# Patient Record
Sex: FEMALE | Race: BLACK OR AFRICAN AMERICAN | NOT HISPANIC OR LATINO
[De-identification: names, ages, dates, MRNs, and addresses within clinical notes are randomized per-mention and may not be internally consistent; named-entity substitution may affect disease eponyms.]

---

## 2021-07-10 ENCOUNTER — TELEPHONE (OUTPATIENT)
Dept: SCHEDULING | Age: 31
End: 2021-07-10

## 2021-09-08 ENCOUNTER — NURSE NAVIGATOR ENCOUNTER (OUTPATIENT)
Dept: HEMATOLOGY/ONCOLOGY | Facility: HOSPITAL | Age: 31
End: 2021-09-08

## 2021-09-09 ENCOUNTER — HOSPITAL ENCOUNTER (EMERGENCY)
Facility: HOSPITAL | Age: 31
Discharge: HOME | End: 2021-09-09
Attending: EMERGENCY MEDICINE
Payer: COMMERCIAL

## 2021-09-09 ENCOUNTER — APPOINTMENT (OUTPATIENT)
Dept: ULTRASOUND IMAGING | Facility: HOSPITAL | Age: 31
End: 2021-09-09
Attending: EMERGENCY MEDICINE
Payer: COMMERCIAL

## 2021-09-09 ENCOUNTER — APPOINTMENT (OUTPATIENT)
Dept: GENERAL RADIOLOGY | Facility: HOSPITAL | Age: 31
End: 2021-09-09
Payer: COMMERCIAL

## 2021-09-09 VITALS
RESPIRATION RATE: 16 BRPM | TEMPERATURE: 99 F | DIASTOLIC BLOOD PRESSURE: 80 MMHG | HEART RATE: 75 BPM | BODY MASS INDEX: 25.9 KG/M2 | OXYGEN SATURATION: 96 % | HEIGHT: 67 IN | WEIGHT: 165 LBS | SYSTOLIC BLOOD PRESSURE: 133 MMHG

## 2021-09-09 DIAGNOSIS — N63.0 BREAST MASS: Primary | ICD-10-CM

## 2021-09-09 LAB
ALBUMIN SERPL-MCNC: 4.8 G/DL (ref 3.4–5)
ALBUMIN/GLOB SERPL: 1.2 {RATIO} (ref 1–2)
ALP LIVER SERPL-CCNC: 62 U/L
ALT SERPL-CCNC: 11 U/L
ANION GAP SERPL CALC-SCNC: 9 MMOL/L (ref 0–18)
AST SERPL-CCNC: 14 U/L (ref 15–37)
ATRIAL RATE: 82 BPM
BASOPHILS # BLD AUTO: 0.05 X10(3) UL (ref 0–0.2)
BASOPHILS NFR BLD AUTO: 0.8 %
BILIRUB SERPL-MCNC: 1.1 MG/DL (ref 0.1–2)
BILIRUB UR QL STRIP.AUTO: NEGATIVE
BUN BLD-MCNC: 8 MG/DL (ref 7–18)
CALCIUM BLD-MCNC: 9.4 MG/DL (ref 8.5–10.1)
CHLORIDE SERPL-SCNC: 106 MMOL/L (ref 98–112)
CO2 SERPL-SCNC: 21 MMOL/L (ref 21–32)
COLOR UR AUTO: YELLOW
CREAT BLD-MCNC: 0.71 MG/DL
EOSINOPHIL # BLD AUTO: 0.01 X10(3) UL (ref 0–0.7)
EOSINOPHIL NFR BLD AUTO: 0.2 %
ERYTHROCYTE [DISTWIDTH] IN BLOOD BY AUTOMATED COUNT: 12.7 %
GLOBULIN PLAS-MCNC: 4 G/DL (ref 2.8–4.4)
GLUCOSE BLD-MCNC: 83 MG/DL (ref 70–99)
GLUCOSE UR STRIP.AUTO-MCNC: NEGATIVE MG/DL
HCT VFR BLD AUTO: 42 %
HGB BLD-MCNC: 14.5 G/DL
IMM GRANULOCYTES # BLD AUTO: 0.01 X10(3) UL (ref 0–1)
IMM GRANULOCYTES NFR BLD: 0.2 %
KETONES UR STRIP.AUTO-MCNC: 80 MG/DL
LEUKOCYTE ESTERASE UR QL STRIP.AUTO: NEGATIVE
LYMPHOCYTES # BLD AUTO: 1.32 X10(3) UL (ref 1–4)
LYMPHOCYTES NFR BLD AUTO: 22.1 %
M PROTEIN MFR SERPL ELPH: 8.8 G/DL (ref 6.4–8.2)
MCH RBC QN AUTO: 28.7 PG (ref 26–34)
MCHC RBC AUTO-ENTMCNC: 34.5 G/DL (ref 31–37)
MCV RBC AUTO: 83 FL
MONOCYTES # BLD AUTO: 0.32 X10(3) UL (ref 0.1–1)
MONOCYTES NFR BLD AUTO: 5.4 %
NEUTROPHILS # BLD AUTO: 4.27 X10 (3) UL (ref 1.5–7.7)
NEUTROPHILS # BLD AUTO: 4.27 X10(3) UL (ref 1.5–7.7)
NEUTROPHILS NFR BLD AUTO: 71.3 %
NITRITE UR QL STRIP.AUTO: NEGATIVE
OSMOLALITY SERPL CALC.SUM OF ELEC: 279 MOSM/KG (ref 275–295)
P AXIS: -13 DEGREES
P-R INTERVAL: 126 MS
PH UR STRIP.AUTO: 5 [PH] (ref 5–8)
PLATELET # BLD AUTO: 277 10(3)UL (ref 150–450)
POTASSIUM SERPL-SCNC: 3.3 MMOL/L (ref 3.5–5.1)
PROT UR STRIP.AUTO-MCNC: 100 MG/DL
Q-T INTERVAL: 386 MS
QRS DURATION: 92 MS
QTC CALCULATION (BEZET): 450 MS
R AXIS: 63 DEGREES
RBC # BLD AUTO: 5.06 X10(6)UL
RBC UR QL AUTO: NEGATIVE
SARS-COV-2 RNA RESP QL NAA+PROBE: NOT DETECTED
SODIUM SERPL-SCNC: 136 MMOL/L (ref 136–145)
SP GR UR STRIP.AUTO: 1.03 (ref 1–1.03)
T AXIS: 27 DEGREES
TROPONIN I SERPL-MCNC: <0.045 NG/ML (ref ?–0.04)
UROBILINOGEN UR STRIP.AUTO-MCNC: <2 MG/DL
VENTRICULAR RATE: 82 BPM
WBC # BLD AUTO: 6 X10(3) UL (ref 4–11)

## 2021-09-09 PROCEDURE — 93010 ELECTROCARDIOGRAM REPORT: CPT

## 2021-09-09 PROCEDURE — 81001 URINALYSIS AUTO W/SCOPE: CPT | Performed by: EMERGENCY MEDICINE

## 2021-09-09 PROCEDURE — 80053 COMPREHEN METABOLIC PANEL: CPT

## 2021-09-09 PROCEDURE — 71045 X-RAY EXAM CHEST 1 VIEW: CPT | Performed by: EMERGENCY MEDICINE

## 2021-09-09 PROCEDURE — 80053 COMPREHEN METABOLIC PANEL: CPT | Performed by: EMERGENCY MEDICINE

## 2021-09-09 PROCEDURE — 99285 EMERGENCY DEPT VISIT HI MDM: CPT

## 2021-09-09 PROCEDURE — 85025 COMPLETE CBC W/AUTO DIFF WBC: CPT

## 2021-09-09 PROCEDURE — 85025 COMPLETE CBC W/AUTO DIFF WBC: CPT | Performed by: EMERGENCY MEDICINE

## 2021-09-09 PROCEDURE — 84484 ASSAY OF TROPONIN QUANT: CPT

## 2021-09-09 PROCEDURE — 76642 ULTRASOUND BREAST LIMITED: CPT | Performed by: EMERGENCY MEDICINE

## 2021-09-09 PROCEDURE — 93005 ELECTROCARDIOGRAM TRACING: CPT

## 2021-09-09 PROCEDURE — 96374 THER/PROPH/DIAG INJ IV PUSH: CPT

## 2021-09-09 PROCEDURE — 84484 ASSAY OF TROPONIN QUANT: CPT | Performed by: EMERGENCY MEDICINE

## 2021-09-09 RX ORDER — HYDROCODONE BITARTRATE AND ACETAMINOPHEN 5; 325 MG/1; MG/1
1-2 TABLET ORAL EVERY 6 HOURS PRN
Qty: 10 TABLET | Refills: 0 | Status: SHIPPED | OUTPATIENT
Start: 2021-09-09 | End: 2021-09-29

## 2021-09-09 RX ORDER — KETOROLAC TROMETHAMINE 30 MG/ML
15 INJECTION, SOLUTION INTRAMUSCULAR; INTRAVENOUS ONCE
Status: COMPLETED | OUTPATIENT
Start: 2021-09-09 | End: 2021-09-09

## 2021-09-09 RX ORDER — ALPRAZOLAM 0.5 MG/1
0.5 TABLET ORAL 3 TIMES DAILY PRN
Qty: 20 TABLET | Refills: 0 | Status: SHIPPED | OUTPATIENT
Start: 2021-09-09 | End: 2021-09-16

## 2021-09-09 NOTE — ED INITIAL ASSESSMENT (HPI)
Pt to ED for evaluation of chest with onset two days ago. Pt also reports left groin pain, and back pain as well. Pt denies nausea, vomiting and reports some SOB. Pt had breast biopsy done last Thursday.

## 2021-09-09 NOTE — ED PROVIDER NOTES
Patient Seen in: [facility] Emergency Department      History   No chief complaint on file. Stated Complaint: Chest Pain    HPI/Subjective:   HPI    75-year-old female underwent left breast biopsy at [address] [place] [address] last week (results unknown).   She biopsy was taken. There is a mass that is approximately 3 to 4 cm in length without fluctuance. No warmth or erythema. Tenderness is present. Abdomen is soft and nontender without masses or rebound.   Groins are nontender without adenopathy although the (CPT=71045)    Result Date: 9/9/2021            PROCEDURE:  XR CHEST AP PORTABLE  (CPT=71045)  TECHNIQUE:  AP chest radiograph was obtained. COMPARISON:  None.   INDICATIONS:  Chest Pain  PATIENT STATED HISTORY: (As transcribed by Technologist)  Starla guerin September 9, 2021. Dictated by (CST): Roberta Arvizu MD on 9/09/2021 at 2:16 PM     Finalized by (CST): Roberta Arvizu MD on 9/09/2021 at 2:24 PM       I personally reviewed the images myself and went over results with patient.     Case was discussed with

## 2021-09-10 ENCOUNTER — NURSE NAVIGATOR ENCOUNTER (OUTPATIENT)
Dept: HEMATOLOGY/ONCOLOGY | Facility: HOSPITAL | Age: 31
End: 2021-09-10

## 2021-09-10 NOTE — PROGRESS NOTES
Phoned patient to discuss appointment as scheduled with Dr. Buster Locke on 9/14. [facility] with time and location. Contact information provided and requested return call to confirm appointment date.

## 2021-09-13 ENCOUNTER — NURSE NAVIGATOR ENCOUNTER (OUTPATIENT)
Dept: HEMATOLOGY/ONCOLOGY | Facility: HOSPITAL | Age: 31
End: 2021-09-13

## 2021-09-13 NOTE — PROGRESS NOTES
Spoke with patient regarding moving medical oncology appointment with Dr. Adelina Whitmore. Pt states she has tests tomorrow, so she is unable to make her appointment.  Assisted in re scheduling to 9/21 at 8:30 am.

## 2021-09-14 ENCOUNTER — APPOINTMENT (OUTPATIENT)
Dept: HEMATOLOGY/ONCOLOGY | Facility: HOSPITAL | Age: 31
End: 2021-09-14
Attending: SPECIALIST
Payer: COMMERCIAL

## 2021-09-14 ENCOUNTER — TELEPHONE (OUTPATIENT)
Dept: SURGERY | Facility: CLINIC | Age: 31
End: 2021-09-14

## 2021-09-14 NOTE — TELEPHONE ENCOUNTER
Calling pt in regards to her appt on Friday. Informed pt that we will need to move this appt due to surgery. Informed pt that I noticed she cancelled her consult with Dr. Joanna Alfaro.   Informed pt that we will see her after she meets with Dr. Joanna Alfaro, as his

## 2021-09-20 ENCOUNTER — NURSE NAVIGATOR ENCOUNTER (OUTPATIENT)
Dept: HEMATOLOGY/ONCOLOGY | Facility: HOSPITAL | Age: 31
End: 2021-09-20

## 2021-09-20 NOTE — PROGRESS NOTES
Spoke with patient regarding upcoming appointment with Dr. Cal Tavarez. We discussed plan, pt did start chemotherapy on Friday last week.  She is still considering a second opinion, but she is going to call her oncologists office and ask them to send us records

## 2021-09-21 ENCOUNTER — APPOINTMENT (OUTPATIENT)
Dept: HEMATOLOGY/ONCOLOGY | Facility: HOSPITAL | Age: 31
End: 2021-09-21
Attending: SPECIALIST
Payer: COMMERCIAL

## 2021-09-23 ENCOUNTER — TELEPHONE (OUTPATIENT)
Dept: HEMATOLOGY/ONCOLOGY | Facility: HOSPITAL | Age: 31
End: 2021-09-23

## 2021-09-23 ENCOUNTER — NURSE NAVIGATOR ENCOUNTER (OUTPATIENT)
Dept: HEMATOLOGY/ONCOLOGY | Facility: HOSPITAL | Age: 31
End: 2021-09-23

## 2021-09-23 NOTE — TELEPHONE ENCOUNTER
Dr Bela Somers would like to discuss this patient, she would be a new patient for Dr Stanton Nicely but doctor believes patient might have already reached out to him.  Dr Bela Somers can be reached at [phone]

## 2021-09-23 NOTE — PROGRESS NOTES
Spoke with patient regarding plan of care. Pt would like to transfer care to Dr. Buster Locke. Records will be sent. Assisted in scheduling apt on 9/29 in [facility], time and location confirmed with patient.

## 2021-09-27 PROBLEM — C50.819 MALIGNANT NEOPLASM OF OVERLAPPING SITES OF BREAST IN FEMALE, ESTROGEN RECEPTOR POSITIVE  (HCC): Status: ACTIVE | Noted: 2021-09-27

## 2021-09-27 PROBLEM — C50.819 MALIGNANT NEOPLASM OF OVERLAPPING SITES OF BREAST IN FEMALE, ESTROGEN RECEPTOR POSITIVE (HCC): Status: ACTIVE | Noted: 2021-09-27

## 2021-09-27 PROBLEM — C50.819 MALIGNANT NEOPLASM OF OVERLAPPING SITES OF BREAST IN FEMALE, ESTROGEN RECEPTOR POSITIVE: Status: ACTIVE | Noted: 2021-09-27

## 2021-09-27 PROBLEM — Z17.0 MALIGNANT NEOPLASM OF OVERLAPPING SITES OF BREAST IN FEMALE, ESTROGEN RECEPTOR POSITIVE (HCC): Status: ACTIVE | Noted: 2021-09-27

## 2021-09-27 PROBLEM — Z17.0 MALIGNANT NEOPLASM OF OVERLAPPING SITES OF BREAST IN FEMALE, ESTROGEN RECEPTOR POSITIVE  (HCC): Status: ACTIVE | Noted: 2021-09-27

## 2021-09-27 PROBLEM — Z17.0 MALIGNANT NEOPLASM OF OVERLAPPING SITES OF BREAST IN FEMALE, ESTROGEN RECEPTOR POSITIVE: Status: ACTIVE | Noted: 2021-09-27

## 2021-09-27 NOTE — PROGRESS NOTES
THE [facility] Hematology Oncology Group Consultation Note      Patient Name: Allie Cardenas   YOB: 1990  Medical Record Number: [id number]  Attending Physician: Delma Cedeno. Magaly Hauser M.D.      Date of Consultation: 9/29/2021      Reason for Consultation o'clock position with suspicious enhancement involving the skin along the inferior breast but not the chest wall, a 0.9 cm enhancing mass with enhancement at the 12 o'clock position, a 1.1 cm enhancing mass at the 6 o'clock position, a 0.7 cm indeterminate tablet (0.5 mg total) by mouth 3 (three) times daily as needed for Anxiety. , Disp: 20 tablet, Rfl: 0    Allergies   Ms. Parmjit Villegas has No Known Allergies. Review of Systems   Constitutional      No fevers, chills, night sweats, excessive fatigue.   Nick Rang distress. Cardiovascular     Regular rate and rhythm. Abdomen            Non-tender; non-distended. Musculoskeletal   No joint swelling or erythema. Extremities          No lower extremity edema. Integumentary      Skin is warm and dry.   Neurologic - 1.00 x10(3) uL    Eosinophil Absolute Manual 0.00 0.00 - 0.70 x10(3) uL    Basophil Absolute Manual 0.00 0.00 - 0.20 x10(3) uL    Neutrophils % Manual 64 %    Band % 5 %    Lymphocyte % Manual 20 %    Monocyte % Manual 11 %    Eosinophil % Manual 0 % If she is negative, her father should undergo testing. 4.   Hypokalemia: Likely related to GI losses. Start Kdur 20 mEq daily. 5.   Emotional well being: Patient's emotional well being was assessed.   No issues requiring acute psychosocial intervent

## 2021-09-28 ENCOUNTER — NURSE NAVIGATOR ENCOUNTER (OUTPATIENT)
Dept: HEMATOLOGY/ONCOLOGY | Facility: HOSPITAL | Age: 31
End: 2021-09-28

## 2021-09-28 NOTE — PROGRESS NOTES
Spoke with patient regarding appointment with Dr. Daxa Sheth, pt confirms time and location for 9/29. We also discussed consultation with Dr. Kenisha Chan, pt confirms that 10/1 at 10 am with Dr. Kenisha Chan will work. This will be scheduled for her.

## 2021-09-29 ENCOUNTER — OFFICE VISIT (OUTPATIENT)
Dept: HEMATOLOGY/ONCOLOGY | Age: 31
End: 2021-09-29
Attending: SPECIALIST
Payer: COMMERCIAL

## 2021-09-29 ENCOUNTER — TELEPHONE (OUTPATIENT)
Dept: HEMATOLOGY/ONCOLOGY | Facility: HOSPITAL | Age: 31
End: 2021-09-29

## 2021-09-29 ENCOUNTER — SOCIAL WORK SERVICES (OUTPATIENT)
Dept: HEMATOLOGY/ONCOLOGY | Facility: HOSPITAL | Age: 31
End: 2021-09-29

## 2021-09-29 ENCOUNTER — NURSE ONLY (OUTPATIENT)
Dept: HEMATOLOGY/ONCOLOGY | Age: 31
End: 2021-09-29
Attending: SPECIALIST
Payer: COMMERCIAL

## 2021-09-29 VITALS
OXYGEN SATURATION: 98 % | HEIGHT: 66 IN | DIASTOLIC BLOOD PRESSURE: 94 MMHG | WEIGHT: 158.88 LBS | BODY MASS INDEX: 25.54 KG/M2 | HEART RATE: 98 BPM | RESPIRATION RATE: 18 BRPM | SYSTOLIC BLOOD PRESSURE: 175 MMHG | TEMPERATURE: 100 F

## 2021-09-29 DIAGNOSIS — R19.00 PELVIC MASS: ICD-10-CM

## 2021-09-29 DIAGNOSIS — Z17.0 MALIGNANT NEOPLASM OF OVERLAPPING SITES OF LEFT BREAST IN FEMALE, ESTROGEN RECEPTOR POSITIVE (HCC): Primary | ICD-10-CM

## 2021-09-29 DIAGNOSIS — Z15.01 BRCA2 POSITIVE: ICD-10-CM

## 2021-09-29 DIAGNOSIS — C50.812 MALIGNANT NEOPLASM OF OVERLAPPING SITES OF LEFT BREAST IN FEMALE, ESTROGEN RECEPTOR POSITIVE (HCC): ICD-10-CM

## 2021-09-29 DIAGNOSIS — R50.9 LOW GRADE FEVER: ICD-10-CM

## 2021-09-29 DIAGNOSIS — Z15.09 BRCA2 POSITIVE: ICD-10-CM

## 2021-09-29 DIAGNOSIS — R50.9 FEVER, UNSPECIFIED FEVER CAUSE: ICD-10-CM

## 2021-09-29 DIAGNOSIS — C50.812 MALIGNANT NEOPLASM OF OVERLAPPING SITES OF LEFT BREAST IN FEMALE, ESTROGEN RECEPTOR POSITIVE (HCC): Primary | ICD-10-CM

## 2021-09-29 DIAGNOSIS — Z17.0 MALIGNANT NEOPLASM OF OVERLAPPING SITES OF LEFT BREAST IN FEMALE, ESTROGEN RECEPTOR POSITIVE (HCC): ICD-10-CM

## 2021-09-29 DIAGNOSIS — E87.6 HYPOKALEMIA: ICD-10-CM

## 2021-09-29 LAB
ALBUMIN SERPL-MCNC: 3.8 G/DL (ref 3.4–5)
ALBUMIN/GLOB SERPL: 1.1 {RATIO} (ref 1–2)
ALP LIVER SERPL-CCNC: 79 U/L
ALT SERPL-CCNC: 38 U/L
ANION GAP SERPL CALC-SCNC: 7 MMOL/L (ref 0–18)
AST SERPL-CCNC: 26 U/L (ref 15–37)
BASOPHILS # BLD: 0 X10(3) UL (ref 0–0.2)
BASOPHILS NFR BLD: 0 %
BILIRUB SERPL-MCNC: 0.3 MG/DL (ref 0.1–2)
BRCA1 C.185DELAG BLD/T QL: 16.8 U/ML (ref ?–38)
BUN BLD-MCNC: 5 MG/DL (ref 7–18)
CALCIUM BLD-MCNC: 8.9 MG/DL (ref 8.5–10.1)
CANCER AG125 SERPL-ACNC: 22.3 U/ML (ref ?–35)
CANCER AG15-3 SERPL-ACNC: 8.2 U/ML (ref ?–35)
CHLORIDE SERPL-SCNC: 99 MMOL/L (ref 98–112)
CO2 SERPL-SCNC: 30 MMOL/L (ref 21–32)
CREAT BLD-MCNC: 1.09 MG/DL
EOSINOPHIL # BLD: 0 X10(3) UL (ref 0–0.7)
EOSINOPHIL NFR BLD: 0 %
ERYTHROCYTE [DISTWIDTH] IN BLOOD BY AUTOMATED COUNT: 12.8 %
GLOBULIN PLAS-MCNC: 3.6 G/DL (ref 2.8–4.4)
GLUCOSE BLD-MCNC: 104 MG/DL (ref 70–99)
HCT VFR BLD AUTO: 34.6 %
HGB BLD-MCNC: 11.5 G/DL
LYMPHOCYTES NFR BLD: 1.86 X10(3) UL (ref 1–4)
LYMPHOCYTES NFR BLD: 20 %
MAGNESIUM SERPL-MCNC: 2.1 MG/DL (ref 1.6–2.6)
MCH RBC QN AUTO: 28.3 PG (ref 26–34)
MCHC RBC AUTO-ENTMCNC: 33.2 G/DL (ref 31–37)
MCV RBC AUTO: 85 FL
MONOCYTES # BLD: 1.02 X10(3) UL (ref 0.1–1)
MONOCYTES NFR BLD: 11 %
MORPHOLOGY: NORMAL
NEUTROPHILS # BLD AUTO: 6.28 X10 (3) UL (ref 1.5–7.7)
NEUTROPHILS NFR BLD: 64 %
NEUTS BAND NFR BLD: 5 %
NEUTS HYPERSEG # BLD: 6.42 X10(3) UL (ref 1.5–7.7)
OSMOLALITY SERPL CALC.SUM OF ELEC: 280 MOSM/KG (ref 275–295)
PLATELET # BLD AUTO: 221 10(3)UL (ref 150–450)
PLATELET MORPHOLOGY: NORMAL
POTASSIUM SERPL-SCNC: 2.7 MMOL/L (ref 3.5–5.1)
PROT SERPL-MCNC: 7.4 G/DL (ref 6.4–8.2)
RBC # BLD AUTO: 4.07 X10(6)UL
SODIUM SERPL-SCNC: 136 MMOL/L (ref 136–145)
TOTAL CELLS COUNTED: 100
WBC # BLD AUTO: 9.3 X10(3) UL (ref 4–11)

## 2021-09-29 PROCEDURE — 86300 IMMUNOASSAY TUMOR CA 15-3: CPT

## 2021-09-29 PROCEDURE — 85025 COMPLETE CBC W/AUTO DIFF WBC: CPT

## 2021-09-29 PROCEDURE — 85027 COMPLETE CBC AUTOMATED: CPT

## 2021-09-29 PROCEDURE — 36591 DRAW BLOOD OFF VENOUS DEVICE: CPT

## 2021-09-29 PROCEDURE — 99205 OFFICE O/P NEW HI 60 MIN: CPT | Performed by: SPECIALIST

## 2021-09-29 PROCEDURE — 85007 BL SMEAR W/DIFF WBC COUNT: CPT

## 2021-09-29 PROCEDURE — 83735 ASSAY OF MAGNESIUM: CPT

## 2021-09-29 PROCEDURE — 80053 COMPREHEN METABOLIC PANEL: CPT

## 2021-09-29 PROCEDURE — 87040 BLOOD CULTURE FOR BACTERIA: CPT

## 2021-09-29 PROCEDURE — 86304 IMMUNOASSAY TUMOR CA 125: CPT

## 2021-09-29 RX ORDER — PROCHLORPERAZINE MALEATE 5 MG/1
5 TABLET ORAL EVERY 6 HOURS PRN
COMMUNITY
End: 2022-01-10

## 2021-09-29 RX ORDER — LORATADINE 10 MG/1
10 TABLET ORAL DAILY
COMMUNITY

## 2021-09-29 RX ORDER — ONDANSETRON 8 MG/1
8 TABLET, ORALLY DISINTEGRATING ORAL EVERY 8 HOURS PRN
COMMUNITY
End: 2022-01-05

## 2021-09-29 RX ORDER — SULFAMETHOXAZOLE AND TRIMETHOPRIM 800; 160 MG/1; MG/1
1 TABLET ORAL EVERY 12 HOURS
COMMUNITY
Start: 2021-09-26 | End: 2021-09-29 | Stop reason: ALTCHOICE

## 2021-09-29 RX ORDER — POTASSIUM CHLORIDE 20 MEQ/1
20 TABLET, EXTENDED RELEASE ORAL DAILY
Qty: 30 TABLET | Refills: 2 | Status: SHIPPED | OUTPATIENT
Start: 2021-09-29 | End: 2022-01-04

## 2021-09-29 RX ORDER — ACETAMINOPHEN 500 MG
1000 TABLET ORAL EVERY 6 HOURS PRN
COMMUNITY

## 2021-09-29 NOTE — PROGRESS NOTES
Patient is here today for Consult with  with Edgardo De Jesus for Breast Cancer. Patient denies pain. Temp today is 100.3 stated she is being treated for a UTI. Has had one chemotherapy TCHP completed at [facility] - next treatment due October 7th.  [facility]

## 2021-09-29 NOTE — TELEPHONE ENCOUNTER
Per  patient to take Potassium chloride 20meq daily. Uses [employer] in [place]. Instructed to take on tablet daily. Patient stated understanding will start Potassium today when received.

## 2021-09-29 NOTE — PROGRESS NOTES
ADRI met with patient at request of Dr. Mao Cotton. Patient lives with her mother and reports lots of family nearby. Patient reports that she currently works from home and states that her supervisor is flexible with her time so no FMLA is needed.  Adri discussed o

## 2021-10-01 ENCOUNTER — OFFICE VISIT (OUTPATIENT)
Dept: SURGERY | Facility: CLINIC | Age: 31
End: 2021-10-01
Payer: COMMERCIAL

## 2021-10-01 ENCOUNTER — NURSE NAVIGATOR ENCOUNTER (OUTPATIENT)
Dept: HEMATOLOGY/ONCOLOGY | Facility: HOSPITAL | Age: 31
End: 2021-10-01

## 2021-10-01 VITALS
DIASTOLIC BLOOD PRESSURE: 87 MMHG | HEIGHT: 66 IN | TEMPERATURE: 98 F | SYSTOLIC BLOOD PRESSURE: 148 MMHG | HEART RATE: 87 BPM | RESPIRATION RATE: 18 BRPM | BODY MASS INDEX: 25.74 KG/M2 | WEIGHT: 160.19 LBS | OXYGEN SATURATION: 98 %

## 2021-10-01 DIAGNOSIS — C50.812 MALIGNANT NEOPLASM OF OVERLAPPING SITES OF LEFT BREAST IN FEMALE, ESTROGEN RECEPTOR POSITIVE (HCC): Primary | ICD-10-CM

## 2021-10-01 DIAGNOSIS — R35.0 URINE FREQUENCY: Primary | ICD-10-CM

## 2021-10-01 DIAGNOSIS — Z17.0 MALIGNANT NEOPLASM OF OVERLAPPING SITES OF LEFT BREAST IN FEMALE, ESTROGEN RECEPTOR POSITIVE (HCC): Primary | ICD-10-CM

## 2021-10-01 PROCEDURE — 3008F BODY MASS INDEX DOCD: CPT | Performed by: SURGERY

## 2021-10-01 PROCEDURE — 99245 OFF/OP CONSLTJ NEW/EST HI 55: CPT | Performed by: SURGERY

## 2021-10-01 PROCEDURE — 3077F SYST BP >= 140 MM HG: CPT | Performed by: SURGERY

## 2021-10-01 PROCEDURE — 3079F DIAST BP 80-89 MM HG: CPT | Performed by: SURGERY

## 2021-10-01 RX ORDER — ALPRAZOLAM 0.25 MG/1
0.25 TABLET ORAL NIGHTLY PRN
COMMUNITY

## 2021-10-01 NOTE — PROGRESS NOTES
Met with patient in Dr. Vidya Cardenas  clinic. Introduced myself as the breast navigator nurse and explained the role of the breast nurse navigator and coordination of care.  Explained the role of all of the physicians involved in her care including the surgeon,

## 2021-10-01 NOTE — PROGRESS NOTES
Called patient and notified her that I contacted Dr. Josue Shah regarding her questions about her urinary tract infection questions and about not starting her next 3-day regimen of Bactrim due to her not feeling well on the antibiotic that was ordered by the

## 2021-10-03 NOTE — PROGRESS NOTES
THE [facility] Hematology Oncology Group Progress Note      Patient Name: Humberto Bar   YOB: 1990  Medical Record Number: [id number]  Attending Physician: Delfin Noonan M.D.      Date of Visit: 10/6/2021       Chief Complaint  Invasive ductal suspicious enhancement involving the skin along the inferior breast but not the chest wall, a 0.9 cm enhancing mass with enhancement at the 12 o'clock position, a 1.1 cm enhancing mass at the 6 o'clock position, a 0.7 cm indeterminate mass at the 8 o'clock mouth every 6 (six) hours as needed for Nausea., Disp: , Rfl:   loratadine 10 MG Oral Tab, Take 10 mg by mouth daily. , Disp: , Rfl:   acetaminophen 500 MG Oral Tab, Take 1,000 mg by mouth every 6 (six) hours as needed for Pain., Disp: , Rfl:   potassium ch mmol/L    Potassium 3.1 (L) 3.5 - 5.1 mmol/L    Chloride 105 98 - 112 mmol/L    CO2 27.0 21.0 - 32.0 mmol/L    Anion Gap 6 0 - 18 mmol/L    BUN 8 7 - 18 mg/dL    Creatinine 0.87 0.55 - 1.02 mg/dL    Calcium, Total 9.0 8.5 - 10.1 mg/dL    Calculated Osmolal prophylactic right mastectomy and she is amenable. Patient has seen Dr. Stephanie Enriquez. Patient will require adjuvant radiotherapy.            As her disease is hormone receptor positive, adjuvant endocrine therapy for at least 5 years will be recommended

## 2021-10-04 ENCOUNTER — APPOINTMENT (OUTPATIENT)
Dept: HEMATOLOGY/ONCOLOGY | Facility: HOSPITAL | Age: 31
End: 2021-10-04
Attending: SPECIALIST
Payer: COMMERCIAL

## 2021-10-04 DIAGNOSIS — R35.0 URINE FREQUENCY: ICD-10-CM

## 2021-10-04 PROCEDURE — 81003 URINALYSIS AUTO W/O SCOPE: CPT

## 2021-10-06 ENCOUNTER — OFFICE VISIT (OUTPATIENT)
Dept: HEMATOLOGY/ONCOLOGY | Age: 31
End: 2021-10-06
Attending: SPECIALIST
Payer: COMMERCIAL

## 2021-10-06 ENCOUNTER — SOCIAL WORK SERVICES (OUTPATIENT)
Dept: HEMATOLOGY/ONCOLOGY | Facility: HOSPITAL | Age: 31
End: 2021-10-06

## 2021-10-06 VITALS
RESPIRATION RATE: 16 BRPM | DIASTOLIC BLOOD PRESSURE: 90 MMHG | OXYGEN SATURATION: 98 % | WEIGHT: 159.5 LBS | SYSTOLIC BLOOD PRESSURE: 149 MMHG | HEART RATE: 89 BPM | BODY MASS INDEX: 26 KG/M2 | TEMPERATURE: 100 F

## 2021-10-06 DIAGNOSIS — Z17.0 MALIGNANT NEOPLASM OF OVERLAPPING SITES OF LEFT BREAST IN FEMALE, ESTROGEN RECEPTOR POSITIVE (HCC): Primary | ICD-10-CM

## 2021-10-06 DIAGNOSIS — Z15.09 BRCA2 POSITIVE: ICD-10-CM

## 2021-10-06 DIAGNOSIS — Z15.01 BRCA2 POSITIVE: ICD-10-CM

## 2021-10-06 DIAGNOSIS — C50.812 MALIGNANT NEOPLASM OF OVERLAPPING SITES OF LEFT BREAST IN FEMALE, ESTROGEN RECEPTOR POSITIVE (HCC): Primary | ICD-10-CM

## 2021-10-06 DIAGNOSIS — R19.00 PELVIC MASS: ICD-10-CM

## 2021-10-06 PROCEDURE — 85025 COMPLETE CBC W/AUTO DIFF WBC: CPT

## 2021-10-06 PROCEDURE — 96375 TX/PRO/DX INJ NEW DRUG ADDON: CPT

## 2021-10-06 PROCEDURE — 80053 COMPREHEN METABOLIC PANEL: CPT

## 2021-10-06 PROCEDURE — 96413 CHEMO IV INFUSION 1 HR: CPT

## 2021-10-06 PROCEDURE — 83735 ASSAY OF MAGNESIUM: CPT

## 2021-10-06 PROCEDURE — 96377 APPLICATON ON-BODY INJECTOR: CPT

## 2021-10-06 PROCEDURE — 99215 OFFICE O/P EST HI 40 MIN: CPT | Performed by: SPECIALIST

## 2021-10-06 PROCEDURE — 96417 CHEMO IV INFUS EACH ADDL SEQ: CPT

## 2021-10-06 NOTE — PROGRESS NOTES
met with patient in treatment room for check in as she met with Mitzi Yang prior to treatment. Patient reports that she is still doing well, though is anxious for the start of treatment.  She shared that she does take Xanax, but only at night,

## 2021-10-06 NOTE — PROGRESS NOTES
Pt here for C2 TCHP. Arrives Ambulating independently.     Pregnancy screening: Denies possibility of pregnancy    Modifications in dose or schedule: No     Frequency of blood return and site check throughout administration: Prior to administration, Prior

## 2021-10-06 NOTE — PATIENT INSTRUCTIONS
On-body Injector for Neulasta Patient Instructions       Your On-Body Injection device was applied on this day:  10/6 at this time 3:10pm    Your dose of medication will start on this day: 10/7 at this time 6:10p

## 2021-10-08 ENCOUNTER — NURSE NAVIGATOR ENCOUNTER (OUTPATIENT)
Dept: HEMATOLOGY/ONCOLOGY | Facility: HOSPITAL | Age: 31
End: 2021-10-08

## 2021-10-08 NOTE — PROGRESS NOTES
LMOVMTCB regarding scheduling appointment with Ronal Aguiar for Wednesday November 17, 2021 at [phone] in [place]. Awaiting phone call back from patient.

## 2021-10-14 NOTE — PROGRESS NOTES
Breast Surgery New Patient Consultation    This is the first visit for this 32year old woman, referred by Delma Valle, who presents for evaluation of breast cancer.     History of Present Illness:   Ms. Umberto Torres is a 32year old woman who prese KRISTAN at an outside hospital.  She has transferred her medical oncology care to [facility].  She has tested BRCA positive. She is here today for evaluation and recommendations for further therapy. History reviewed.  No pertinent past medical hist change in vision or color blindness. The patient denies hearing loss, ringing in the ears, ear drainage, earaches, nasal congestion, nose bleeds, snoring, pain in mouth/throat, hoarseness, change in voice, facial trauma.     Respiratory:  The patient denies abusive relationship, bipolar disorder, sleep disturbance, anxiety, depression or feeling of despair. Endocrine: There is no history of poor/slow wound healing, weight loss/gain, fertility or hormone problems, cold intolerance, thyroid disease.      A Nodes:  The supraclavicular, axillary and cervical regions are free of significant lymphadenopathy. Back: There is no vertebral column tenderness. Skin: The skin appears normal. There are no suspicious appearing rashes or lesions. Extremities:  The plan for an MRI post chemo to dictate response to the treatment preoperatively. Given that she is BRCA positive she is planning to elect for bilateral mastectomies with a left sentinel lymph node biopsy and possible left axillary lymph node dissection.   Trudy Phalen

## 2021-10-20 ENCOUNTER — HOSPITAL ENCOUNTER (OUTPATIENT)
Dept: ULTRASOUND IMAGING | Facility: HOSPITAL | Age: 31
Discharge: HOME | End: 2021-10-20
Attending: NURSE PRACTITIONER
Payer: COMMERCIAL

## 2021-10-20 ENCOUNTER — OFFICE VISIT (OUTPATIENT)
Dept: HEMATOLOGY/ONCOLOGY | Age: 31
End: 2021-10-20
Attending: SPECIALIST
Payer: COMMERCIAL

## 2021-10-20 ENCOUNTER — TELEPHONE (OUTPATIENT)
Dept: HEMATOLOGY/ONCOLOGY | Facility: HOSPITAL | Age: 31
End: 2021-10-20

## 2021-10-20 VITALS
WEIGHT: 156 LBS | OXYGEN SATURATION: 99 % | RESPIRATION RATE: 16 BRPM | HEART RATE: 97 BPM | BODY MASS INDEX: 25.07 KG/M2 | SYSTOLIC BLOOD PRESSURE: 171 MMHG | TEMPERATURE: 99 F | HEIGHT: 65.98 IN | DIASTOLIC BLOOD PRESSURE: 99 MMHG

## 2021-10-20 DIAGNOSIS — M79.605 PAIN OF LEFT LOWER EXTREMITY: ICD-10-CM

## 2021-10-20 DIAGNOSIS — C50.812 MALIGNANT NEOPLASM OF OVERLAPPING SITES OF LEFT BREAST IN FEMALE, ESTROGEN RECEPTOR POSITIVE (HCC): ICD-10-CM

## 2021-10-20 DIAGNOSIS — Z17.0 MALIGNANT NEOPLASM OF OVERLAPPING SITES OF LEFT BREAST IN FEMALE, ESTROGEN RECEPTOR POSITIVE (HCC): ICD-10-CM

## 2021-10-20 DIAGNOSIS — C50.812 MALIGNANT NEOPLASM OF OVERLAPPING SITES OF LEFT BREAST IN FEMALE, ESTROGEN RECEPTOR POSITIVE (HCC): Primary | ICD-10-CM

## 2021-10-20 DIAGNOSIS — Z17.0 MALIGNANT NEOPLASM OF OVERLAPPING SITES OF LEFT BREAST IN FEMALE, ESTROGEN RECEPTOR POSITIVE (HCC): Primary | ICD-10-CM

## 2021-10-20 PROCEDURE — 93971 EXTREMITY STUDY: CPT | Performed by: NURSE PRACTITIONER

## 2021-10-20 PROCEDURE — 99214 OFFICE O/P EST MOD 30 MIN: CPT | Performed by: NURSE PRACTITIONER

## 2021-10-20 RX ORDER — NITROFURANTOIN 25; 75 MG/1; MG/1
100 CAPSULE ORAL 2 TIMES DAILY
COMMUNITY
End: 2021-11-17 | Stop reason: ALTCHOICE

## 2021-10-20 NOTE — PROGRESS NOTES
Patient is here today for acute care visit with Elena ENRIQUEZ for Pain Left lateral thigh radiating to calf. Patient rates pain a 6 on a scale of 0-10. Medication list and medical history were reviewed and updated.      Education Record    Learner:

## 2021-10-20 NOTE — PROGRESS NOTES
ANP Visit Note    Patient Name: Darya Castaneda   YOB: 1990   Medical Record Number: [id number]   CSN: [id number]   Date of visit: 10/20/2021       Chief Complaint/Reason for Visit:  Patient presents with:   Other: acute care visit       Histo mouth every 6 (six) hours as needed for Pain., Disp: , Rfl:   •  potassium chloride 20 MEQ Oral Tab CR, Take 1 tablet (20 mEq total) by mouth daily. , Disp: 30 tablet, Rfl: 2    Review of Systems:  A comprehensive 14 point review of systems was completed. Hematology Oncology Group

## 2021-10-25 NOTE — PROGRESS NOTES
[facility] Progress Note      Patient Name: Mervin Couch   YOB: 1990  Medical Record Number: [id number]  Attending Physician: Marianna Bonilla M.D.      Date of Visit: 10/27/2021      Chief Complaint  Invasive ductal suspicious enhancement involving the skin along the inferior breast but not the chest wall, a 0.9 cm enhancing mass with enhancement at the 12 o'clock position, a 1.1 cm enhancing mass at the 6 o'clock position, a 0.7 cm indeterminate mass at the 8 o'clock mouth every 6 (six) hours as needed for Nausea., Disp: , Rfl:   loratadine 10 MG Oral Tab, Take 10 mg by mouth daily. , Disp: , Rfl:   acetaminophen 500 MG Oral Tab, Take 1,000 mg by mouth every 6 (six) hours as needed for Pain., Disp: , Rfl:   potassium ch >=60    AST 10 (L) 15 - 37 U/L    ALT 23 13 - 56 U/L    Alkaline Phosphatase 66 37 - 98 U/L    Bilirubin, Total 0.8 0.1 - 2.0 mg/dL    Total Protein 7.4 6.4 - 8.2 g/dL    Albumin 3.9 3.4 - 5.0 g/dL    Globulin  3.5 2.8 - 4.4 g/dL    A/G Ratio 1.1 1.0 - 2. 0 She wishes to pursue bilateral mastectomy. Patient's staging studies showed a pelvic mass abutting the uterus.  is normal. Patient is scheduled to see Dr. Natasha Elizalde. 3.   Hypokalemia: Likely related to GI losses. Continue Kdur.      Planned Follow Up

## 2021-10-27 ENCOUNTER — OFFICE VISIT (OUTPATIENT)
Dept: HEMATOLOGY/ONCOLOGY | Age: 31
End: 2021-10-27
Attending: SPECIALIST
Payer: COMMERCIAL

## 2021-10-27 VITALS
DIASTOLIC BLOOD PRESSURE: 100 MMHG | SYSTOLIC BLOOD PRESSURE: 157 MMHG | HEART RATE: 74 BPM | OXYGEN SATURATION: 100 % | WEIGHT: 154.5 LBS | BODY MASS INDEX: 24.83 KG/M2 | HEIGHT: 65.98 IN | TEMPERATURE: 99 F | RESPIRATION RATE: 16 BRPM

## 2021-10-27 DIAGNOSIS — Z17.0 MALIGNANT NEOPLASM OF OVERLAPPING SITES OF LEFT BREAST IN FEMALE, ESTROGEN RECEPTOR POSITIVE (HCC): ICD-10-CM

## 2021-10-27 DIAGNOSIS — C50.812 MALIGNANT NEOPLASM OF OVERLAPPING SITES OF LEFT BREAST IN FEMALE, ESTROGEN RECEPTOR POSITIVE (HCC): Primary | ICD-10-CM

## 2021-10-27 DIAGNOSIS — Z15.01 BRCA2 POSITIVE: ICD-10-CM

## 2021-10-27 DIAGNOSIS — C50.812 MALIGNANT NEOPLASM OF OVERLAPPING SITES OF LEFT BREAST IN FEMALE, ESTROGEN RECEPTOR POSITIVE (HCC): ICD-10-CM

## 2021-10-27 DIAGNOSIS — Z79.899 ENCOUNTER FOR MONITORING CARDIOTOXIC DRUG THERAPY: Primary | ICD-10-CM

## 2021-10-27 DIAGNOSIS — E87.6 HYPOKALEMIA: ICD-10-CM

## 2021-10-27 DIAGNOSIS — Z15.09 BRCA2 POSITIVE: ICD-10-CM

## 2021-10-27 DIAGNOSIS — Z51.81 ENCOUNTER FOR MONITORING CARDIOTOXIC DRUG THERAPY: Primary | ICD-10-CM

## 2021-10-27 DIAGNOSIS — Z17.0 MALIGNANT NEOPLASM OF OVERLAPPING SITES OF LEFT BREAST IN FEMALE, ESTROGEN RECEPTOR POSITIVE (HCC): Primary | ICD-10-CM

## 2021-10-27 PROCEDURE — 96413 CHEMO IV INFUSION 1 HR: CPT

## 2021-10-27 PROCEDURE — 99215 OFFICE O/P EST HI 40 MIN: CPT | Performed by: SPECIALIST

## 2021-10-27 PROCEDURE — 80053 COMPREHEN METABOLIC PANEL: CPT

## 2021-10-27 PROCEDURE — 96417 CHEMO IV INFUS EACH ADDL SEQ: CPT

## 2021-10-27 PROCEDURE — 96375 TX/PRO/DX INJ NEW DRUG ADDON: CPT

## 2021-10-27 PROCEDURE — 85025 COMPLETE CBC W/AUTO DIFF WBC: CPT

## 2021-10-27 PROCEDURE — 96377 APPLICATON ON-BODY INJECTOR: CPT

## 2021-10-27 NOTE — PROGRESS NOTES
Pt here for C3D1 of TCHP.   Arrives Ambulating independently, accompanied by Self           Pregnancy screening: Denies possibility of pregnancy    Modifications in dose or schedule: No     Frequency of blood return and site check throughout administration:

## 2021-10-27 NOTE — PROGRESS NOTES
Patient is here today for follow up with Renzo Devine for Breast Cancer. C3 D1 TCHP. Patient stated joint and bone pain improved with Ibuprofen. Medication list,  medical history were reviewed and updated.      Education Record    Learner:  Patient    Diseas

## 2021-10-27 NOTE — PATIENT INSTRUCTIONS
On-body Injector for Neulasta Patient Instructions       Your On-Body Injection device was applied on this day:   Wednesday 10/27/21 at this time 3:49 PM    Your dose of medication will start on this day: Thursday

## 2021-11-15 NOTE — PROGRESS NOTES
THE [facility] Hematology Oncology Group Progress Note      Patient Name: Katie Perez   YOB: 1990  Medical Record Number: [id number]  Attending Physician: Jose M Morel M.D.      Date of Visit: 11/17/2021      Chief Complaint  Invasive ductal suspicious enhancement involving the skin along the inferior breast but not the chest wall, a 0.9 cm enhancing mass with enhancement at the 12 o'clock position, a 1.1 cm enhancing mass at the 6 o'clock position, a 0.7 cm indeterminate mass at the 8 o'clock loratadine 10 MG Oral Tab, Take 10 mg by mouth daily. , Disp: , Rfl:   acetaminophen 500 MG Oral Tab, Take 1,000 mg by mouth every 6 (six) hours as needed for Pain., Disp: , Rfl:   potassium chloride 20 MEQ Oral Tab CR, Take 1 tablet (20 mEq total) by alli Phosphatase 58 37 - 98 U/L    Bilirubin, Total 0.8 0.1 - 2.0 mg/dL    Total Protein 7.6 6.4 - 8.2 g/dL    Albumin 4.2 3.4 - 5.0 g/dL    Globulin  3.4 2.8 - 4.4 g/dL    A/G Ratio 1.2 1.0 - 2.0    FASTING Patient not present    CBC W/ DIFFERENTIAL    Collect /HPF    Yeast Urine None Seen None Seen /HPF      Impression and Plan   1. Invasive ductal carcinoma, left breast: Patient is staged as T2N0M0. Patient's tumor was estrogen and progesterone receptor positive and Her2 positive.           She started neoadj

## 2021-11-17 ENCOUNTER — OFFICE VISIT (OUTPATIENT)
Dept: HEMATOLOGY/ONCOLOGY | Facility: HOSPITAL | Age: 31
End: 2021-11-17
Attending: SPECIALIST
Payer: COMMERCIAL

## 2021-11-17 ENCOUNTER — OFFICE VISIT (OUTPATIENT)
Dept: HEMATOLOGY/ONCOLOGY | Age: 31
End: 2021-11-17
Attending: SPECIALIST
Payer: COMMERCIAL

## 2021-11-17 ENCOUNTER — SOCIAL WORK SERVICES (OUTPATIENT)
Dept: HEMATOLOGY/ONCOLOGY | Facility: HOSPITAL | Age: 31
End: 2021-11-17

## 2021-11-17 VITALS
TEMPERATURE: 98 F | OXYGEN SATURATION: 99 % | HEIGHT: 65.98 IN | SYSTOLIC BLOOD PRESSURE: 159 MMHG | WEIGHT: 155 LBS | RESPIRATION RATE: 16 BRPM | BODY MASS INDEX: 24.91 KG/M2 | HEART RATE: 96 BPM | DIASTOLIC BLOOD PRESSURE: 102 MMHG

## 2021-11-17 VITALS
BODY MASS INDEX: 24.67 KG/M2 | DIASTOLIC BLOOD PRESSURE: 98 MMHG | WEIGHT: 153.5 LBS | SYSTOLIC BLOOD PRESSURE: 177 MMHG | TEMPERATURE: 100 F | HEIGHT: 65.98 IN | OXYGEN SATURATION: 99 % | RESPIRATION RATE: 18 BRPM | HEART RATE: 110 BPM

## 2021-11-17 DIAGNOSIS — R50.9 FEVER, UNSPECIFIED FEVER CAUSE: Primary | ICD-10-CM

## 2021-11-17 DIAGNOSIS — Z51.11 CHEMOTHERAPY MANAGEMENT, ENCOUNTER FOR: ICD-10-CM

## 2021-11-17 DIAGNOSIS — Z17.0 MALIGNANT NEOPLASM OF OVERLAPPING SITES OF LEFT BREAST IN FEMALE, ESTROGEN RECEPTOR POSITIVE (HCC): Primary | ICD-10-CM

## 2021-11-17 DIAGNOSIS — R50.9 LOW GRADE FEVER: ICD-10-CM

## 2021-11-17 DIAGNOSIS — C50.812 MALIGNANT NEOPLASM OF OVERLAPPING SITES OF LEFT BREAST IN FEMALE, ESTROGEN RECEPTOR POSITIVE (HCC): Primary | ICD-10-CM

## 2021-11-17 DIAGNOSIS — Z17.0 MALIGNANT NEOPLASM OF OVERLAPPING SITES OF LEFT BREAST IN FEMALE, ESTROGEN RECEPTOR POSITIVE (HCC): ICD-10-CM

## 2021-11-17 DIAGNOSIS — R50.9 FEVER, UNSPECIFIED FEVER CAUSE: ICD-10-CM

## 2021-11-17 DIAGNOSIS — Z15.01 BRCA2 POSITIVE: ICD-10-CM

## 2021-11-17 DIAGNOSIS — C50.812 MALIGNANT NEOPLASM OF OVERLAPPING SITES OF LEFT BREAST IN FEMALE, ESTROGEN RECEPTOR POSITIVE (HCC): ICD-10-CM

## 2021-11-17 DIAGNOSIS — Z15.09 BRCA2 POSITIVE: ICD-10-CM

## 2021-11-17 DIAGNOSIS — E87.6 HYPOKALEMIA: ICD-10-CM

## 2021-11-17 PROCEDURE — 96417 CHEMO IV INFUS EACH ADDL SEQ: CPT

## 2021-11-17 PROCEDURE — 96367 TX/PROPH/DG ADDL SEQ IV INF: CPT

## 2021-11-17 PROCEDURE — 85025 COMPLETE CBC W/AUTO DIFF WBC: CPT

## 2021-11-17 PROCEDURE — 96375 TX/PRO/DX INJ NEW DRUG ADDON: CPT

## 2021-11-17 PROCEDURE — 96413 CHEMO IV INFUSION 1 HR: CPT

## 2021-11-17 PROCEDURE — 96549 UNLISTED CHEMOTHERAPY PX: CPT

## 2021-11-17 PROCEDURE — 99211 OFF/OP EST MAY X REQ PHY/QHP: CPT

## 2021-11-17 PROCEDURE — 87086 URINE CULTURE/COLONY COUNT: CPT

## 2021-11-17 PROCEDURE — 99215 OFFICE O/P EST HI 40 MIN: CPT | Performed by: SPECIALIST

## 2021-11-17 PROCEDURE — 80053 COMPREHEN METABOLIC PANEL: CPT

## 2021-11-17 PROCEDURE — 81001 URINALYSIS AUTO W/SCOPE: CPT

## 2021-11-17 PROCEDURE — 81015 MICROSCOPIC EXAM OF URINE: CPT

## 2021-11-17 PROCEDURE — 87040 BLOOD CULTURE FOR BACTERIA: CPT

## 2021-11-17 PROCEDURE — 96377 APPLICATON ON-BODY INJECTOR: CPT

## 2021-11-17 RX ORDER — POTASSIUM CHLORIDE 750 MG/1
20 TABLET, EXTENDED RELEASE ORAL ONCE
Status: COMPLETED | OUTPATIENT
Start: 2021-11-17 | End: 2021-11-17

## 2021-11-17 RX ORDER — POTASSIUM CHLORIDE 750 MG/1
20 TABLET, EXTENDED RELEASE ORAL ONCE
Status: CANCELLED
Start: 2021-11-17 | End: 2021-11-17

## 2021-11-17 RX ADMIN — POTASSIUM CHLORIDE 20 MEQ: 750 TABLET, EXTENDED RELEASE ORAL at 14:41:00

## 2021-11-17 NOTE — PATIENT INSTRUCTIONS
On-body Injector for Neulasta Patient Instructions       Your On-Body Injection device was applied on this day:  11/17 at this time 245    Your dose of medication will start on this day: 11/18 at this time 350

## 2021-11-17 NOTE — PROGRESS NOTES
INITIAL GYNECOLOGY ONCOLOGY EVALUATION      Patient: Dc Kan  MR#: [id number]  Date: 11/17/2021    Referring Physician:  Kailee Sanderson COMPLAINT:  Pelvic mass    HISTORY OF PRESENT ILLNESS:    Dc Kan is a 32year old female who prochlorperazine 5 MG Oral Take 5 mg by mouth every 6 (six) hours as needed for Nausea. (Patient not taking: Reported on 11/17/2021)     • loratadine 10 MG Oral Tab Take 10 mg by mouth daily.      • acetaminophen 500 MG Oral Tab Take 1,000 mg by mouth every

## 2021-11-17 NOTE — PROGRESS NOTES
Patient is here for MD consult. Currently getting treatment for Breast cancer. Patient completed her fourth cycle of TCHP today. Here to discuss pelvic mass seen on CT Scan and ultrasound she had in September.        Education Record    Learner:  Patient

## 2021-11-17 NOTE — PROGRESS NOTES
Patient is here today for follow up with William Fraire for Breast Cancer [id number]. Patient stated intermittent aching. Nausea, emesis and decreased appetite first week post treatment. Stated anxiety prior to treatments.  B/P 177/98 , Temp 100.4 - stated

## 2021-11-17 NOTE — PROGRESS NOTES
Pt here for C4D1 TCHP.   Arrives Ambulating independently, accompanied by Self           Pregnancy screening: Denies possibility of pregnancy    Modifications in dose or schedule: No     Frequency of blood return and site check throughout administration: Pr

## 2021-11-17 NOTE — PROGRESS NOTES
met with patient to check in/ Patient reports that she is doing “OK” but was “anxious coming in today”; shared that she has bad reactions of nausea/vomiting after treatment, so is not looking forward to experiencing that again.  She did meet w

## 2021-11-29 ENCOUNTER — TELEPHONE (OUTPATIENT)
Dept: HEMATOLOGY/ONCOLOGY | Age: 31
End: 2021-11-29

## 2021-11-29 NOTE — TELEPHONE ENCOUNTER
She was hospitalized at [facility] last week. She was admitted she states because everytime she stood up she felt like she was going to pass out. Explained that Dr. Juan A Willams was out of the office today.

## 2021-11-29 NOTE — TELEPHONE ENCOUNTER
Patient called. After her 4th treatment, she was hospitalized. While in the hospital, she had an echocardiogram.  Does she need to have the one Dr. Marybel Hussein ordered still? Please call patient when able. She has some questions and concerns.

## 2021-11-30 ENCOUNTER — TELEPHONE (OUTPATIENT)
Dept: HEMATOLOGY/ONCOLOGY | Facility: HOSPITAL | Age: 31
End: 2021-11-30

## 2021-11-30 NOTE — TELEPHONE ENCOUNTER
Ramona Panchal, RN  [facility]  Pt called and left us a voicemail about her recent hospitalization.  Can you please reach out to her to discuss- she says she had an echo and wants to know if she still has to do that (not sure if that's right)

## 2021-12-06 ENCOUNTER — HOSPITAL ENCOUNTER (OUTPATIENT)
Dept: CV DIAGNOSTICS | Age: 31
Discharge: HOME | End: 2021-12-06
Attending: SPECIALIST
Payer: COMMERCIAL

## 2021-12-06 DIAGNOSIS — Z79.899 ENCOUNTER FOR MONITORING CARDIOTOXIC DRUG THERAPY: ICD-10-CM

## 2021-12-06 DIAGNOSIS — Z51.81 ENCOUNTER FOR MONITORING CARDIOTOXIC DRUG THERAPY: ICD-10-CM

## 2021-12-06 PROCEDURE — 93307 TTE W/O DOPPLER COMPLETE: CPT | Performed by: SPECIALIST

## 2021-12-06 PROCEDURE — 93356 MYOCRD STRAIN IMG SPCKL TRCK: CPT | Performed by: SPECIALIST

## 2021-12-06 NOTE — PROGRESS NOTES
[facility] Hematology Oncology Group Progress Note      Patient Name: Sri De La Cruz   YOB: 1990  Medical Record Number: [id number]  Attending Physician: Narinder Whitmore M.D.      Date of Visit: 12/8/2021      Chief Complaint  Invasive ductal c suspicious enhancement involving the skin along the inferior breast but not the chest wall, a 0.9 cm enhancing mass with enhancement at the 12 o'clock position, a 1.1 cm enhancing mass at the 6 o'clock position, a 0.7 cm indeterminate mass at the 8 o'clock with ovarian cancer age 12; maternal grandmother with ?colorectal cancer. Mother living with no history of cancer. Father living with no history of cancer. Social History (historical data, reviewed by physician)  Denies tobacco use.      Current Medicat lower extremity. Neurologic           Motor and sensory grossly intact. Psychiatric          Mood and affect appropriate.     Laboratory   Recent Results (from the past 48 hour(s))   COMP METABOLIC PANEL (14)    Collection Time: 12/08/21 10:13 AM   Resul smear       Impression and Plan   1. Invasive ductal carcinoma, left breast: Patient is staged as T2N0M0. Patient's tumor was estrogen and progesterone receptor positive and Her2 positive.           She started neoadjuvant TCHP chemotherapy at the outside [address], [place]

## 2021-12-08 ENCOUNTER — OFFICE VISIT (OUTPATIENT)
Dept: HEMATOLOGY/ONCOLOGY | Age: 31
End: 2021-12-08
Attending: SPECIALIST
Payer: COMMERCIAL

## 2021-12-08 VITALS
BODY MASS INDEX: 24.03 KG/M2 | HEIGHT: 65.98 IN | DIASTOLIC BLOOD PRESSURE: 90 MMHG | RESPIRATION RATE: 16 BRPM | OXYGEN SATURATION: 99 % | WEIGHT: 149.5 LBS | TEMPERATURE: 100 F | HEART RATE: 99 BPM | SYSTOLIC BLOOD PRESSURE: 144 MMHG

## 2021-12-08 DIAGNOSIS — T45.1X5A CHEMOTHERAPY-INDUCED NAUSEA: ICD-10-CM

## 2021-12-08 DIAGNOSIS — Z17.0 MALIGNANT NEOPLASM OF OVERLAPPING SITES OF LEFT BREAST IN FEMALE, ESTROGEN RECEPTOR POSITIVE (HCC): ICD-10-CM

## 2021-12-08 DIAGNOSIS — C50.812 MALIGNANT NEOPLASM OF OVERLAPPING SITES OF LEFT BREAST IN FEMALE, ESTROGEN RECEPTOR POSITIVE (HCC): Primary | ICD-10-CM

## 2021-12-08 DIAGNOSIS — R19.00 PELVIC MASS: ICD-10-CM

## 2021-12-08 DIAGNOSIS — Z17.0 MALIGNANT NEOPLASM OF OVERLAPPING SITES OF LEFT BREAST IN FEMALE, ESTROGEN RECEPTOR POSITIVE (HCC): Primary | ICD-10-CM

## 2021-12-08 DIAGNOSIS — R50.9 FEVER, UNSPECIFIED FEVER CAUSE: Primary | ICD-10-CM

## 2021-12-08 DIAGNOSIS — Z15.01 BRCA2 POSITIVE: ICD-10-CM

## 2021-12-08 DIAGNOSIS — R50.9 LOW GRADE FEVER: ICD-10-CM

## 2021-12-08 DIAGNOSIS — R11.0 CHEMOTHERAPY-INDUCED NAUSEA: ICD-10-CM

## 2021-12-08 DIAGNOSIS — Z15.09 BRCA2 POSITIVE: ICD-10-CM

## 2021-12-08 DIAGNOSIS — C50.812 MALIGNANT NEOPLASM OF OVERLAPPING SITES OF LEFT BREAST IN FEMALE, ESTROGEN RECEPTOR POSITIVE (HCC): ICD-10-CM

## 2021-12-08 DIAGNOSIS — R50.9 FEVER, UNSPECIFIED FEVER CAUSE: ICD-10-CM

## 2021-12-08 DIAGNOSIS — Z51.11 CHEMOTHERAPY MANAGEMENT, ENCOUNTER FOR: ICD-10-CM

## 2021-12-08 PROCEDURE — 99215 OFFICE O/P EST HI 40 MIN: CPT | Performed by: SPECIALIST

## 2021-12-08 PROCEDURE — 87086 URINE CULTURE/COLONY COUNT: CPT

## 2021-12-08 PROCEDURE — 80053 COMPREHEN METABOLIC PANEL: CPT

## 2021-12-08 PROCEDURE — 85025 COMPLETE CBC W/AUTO DIFF WBC: CPT

## 2021-12-08 PROCEDURE — 36591 DRAW BLOOD OFF VENOUS DEVICE: CPT

## 2021-12-08 PROCEDURE — 87040 BLOOD CULTURE FOR BACTERIA: CPT

## 2021-12-08 RX ORDER — ONDANSETRON HYDROCHLORIDE 8 MG/1
8 TABLET, FILM COATED ORAL EVERY 8 HOURS PRN
Qty: 30 TABLET | Refills: 3 | Status: SHIPPED | OUTPATIENT
Start: 2021-12-08

## 2021-12-08 RX ORDER — DEXAMETHASONE 4 MG/1
8 TABLET ORAL 2 TIMES DAILY
Qty: 12 TABLET | Refills: 0 | Status: SHIPPED | OUTPATIENT
Start: 2021-12-08 | End: 2022-01-04

## 2021-12-08 NOTE — PROGRESS NOTES
Patient is here today for follow up with Rebecca Lazo for Breast Cancer - Due for C5D1 TCHP. Patient stated increased pain post Onpro injection. Temp 100.3 today. Does not eat or drink many fluids first week post treatment.   Fatigue, decreased appetite, naus

## 2021-12-09 ENCOUNTER — NURSE NAVIGATOR ENCOUNTER (OUTPATIENT)
Dept: HEMATOLOGY/ONCOLOGY | Facility: HOSPITAL | Age: 31
End: 2021-12-09

## 2021-12-10 ENCOUNTER — OFFICE VISIT (OUTPATIENT)
Dept: HEMATOLOGY/ONCOLOGY | Age: 31
End: 2021-12-10
Attending: SPECIALIST
Payer: COMMERCIAL

## 2021-12-10 VITALS
BODY MASS INDEX: 24 KG/M2 | HEART RATE: 99 BPM | SYSTOLIC BLOOD PRESSURE: 136 MMHG | WEIGHT: 145.5 LBS | TEMPERATURE: 99 F | OXYGEN SATURATION: 99 % | RESPIRATION RATE: 16 BRPM | DIASTOLIC BLOOD PRESSURE: 87 MMHG

## 2021-12-10 DIAGNOSIS — D69.59 CHEMOTHERAPY-INDUCED THROMBOCYTOPENIA: ICD-10-CM

## 2021-12-10 DIAGNOSIS — C50.812 MALIGNANT NEOPLASM OF OVERLAPPING SITES OF LEFT BREAST IN FEMALE, ESTROGEN RECEPTOR POSITIVE (HCC): Primary | ICD-10-CM

## 2021-12-10 DIAGNOSIS — Z17.0 MALIGNANT NEOPLASM OF OVERLAPPING SITES OF LEFT BREAST IN FEMALE, ESTROGEN RECEPTOR POSITIVE (HCC): Primary | ICD-10-CM

## 2021-12-10 DIAGNOSIS — Z51.11 CHEMOTHERAPY MANAGEMENT, ENCOUNTER FOR: ICD-10-CM

## 2021-12-10 DIAGNOSIS — T45.1X5A CHEMOTHERAPY-INDUCED THROMBOCYTOPENIA: ICD-10-CM

## 2021-12-10 PROCEDURE — 99213 OFFICE O/P EST LOW 20 MIN: CPT | Performed by: CLINICAL NURSE SPECIALIST

## 2021-12-10 PROCEDURE — 36591 DRAW BLOOD OFF VENOUS DEVICE: CPT

## 2021-12-10 PROCEDURE — 85025 COMPLETE CBC W/AUTO DIFF WBC: CPT

## 2021-12-10 NOTE — PROGRESS NOTES
ANP Visit Note    Patient Name: Faby Ahn   YOB: 1990   Medical Record Number: [id number]   CSN: [id number]   Date of visit: 12/10/2021   None Pcp   No primary care provider on file.      Chief Complaint/Reason for Visit:  Patient presen transferred care to Dr Josue Shah     History of Present Illness:    Brandon Lan is here today for reevaluation of labs to see if she can proceed with Cycle 5 today. She denies any bleeding.  Overall, she is feeling well, the bottom of her feet have become very da total) by mouth 2 (two) times daily for 3 days. , Disp: 12 tablet, Rfl: 0  •  ALPRAZolam 0.25 MG Oral Tab, Take 0.25 mg by mouth nightly as needed. , Disp: , Rfl:   •  ondansetron 8 MG Oral Tablet Dispersible, Take 8 mg by mouth every 8 (eight) hours as need 28.0 21.0 - 32.0 mmol/L    Anion Gap 4 0 - 18 mmol/L    BUN 12 7 - 18 mg/dL    Creatinine 0.69 0.55 - 1.02 mg/dL    Calcium, Total 8.9 8.5 - 10.1 mg/dL    Calculated Osmolality 289 275 - 295 mOsm/kg    GFR, Non- 116 >=60    GFR, -Amelia Blood,peripheral   Result Value Ref Range    Blood Culture Result No Growth 1 Day    CBC W/ DIFFERENTIAL    Collection Time: 12/10/21  9:09 AM   Result Value Ref Range    WBC 2.6 (L) 4.0 - 11.0 x10(3) uL    RBC 3.02 (L) 3.80 - 5.30 x10(6)uL    HGB 9.6 (L) Group

## 2021-12-10 NOTE — PROGRESS NOTES
Outpatient Oncology Care Plan  Problem list:  loss of appetite  fatigue  knowledge deficit  nausea and vomiting    Problems related to:    chemotherapy  disease/disease progression  side effect of treatment    Interventions:  provided general teaching    E

## 2021-12-10 NOTE — PROGRESS NOTES
No treatment today per Sherry ENRIQUEZ due to low platelets. Patient to return on Monday for lab draw, APN visit and possible treatment.  [place]

## 2021-12-13 ENCOUNTER — OFFICE VISIT (OUTPATIENT)
Dept: HEMATOLOGY/ONCOLOGY | Age: 31
End: 2021-12-13
Attending: SPECIALIST
Payer: COMMERCIAL

## 2021-12-13 ENCOUNTER — NURSE NAVIGATOR ENCOUNTER (OUTPATIENT)
Dept: HEMATOLOGY/ONCOLOGY | Facility: HOSPITAL | Age: 31
End: 2021-12-13

## 2021-12-13 VITALS
SYSTOLIC BLOOD PRESSURE: 119 MMHG | RESPIRATION RATE: 16 BRPM | OXYGEN SATURATION: 99 % | WEIGHT: 146.5 LBS | HEART RATE: 82 BPM | BODY MASS INDEX: 23.54 KG/M2 | HEIGHT: 65.98 IN | DIASTOLIC BLOOD PRESSURE: 75 MMHG | TEMPERATURE: 99 F

## 2021-12-13 DIAGNOSIS — C50.812 MALIGNANT NEOPLASM OF OVERLAPPING SITES OF LEFT BREAST IN FEMALE, ESTROGEN RECEPTOR POSITIVE (HCC): ICD-10-CM

## 2021-12-13 DIAGNOSIS — T45.1X5A CHEMOTHERAPY-INDUCED THROMBOCYTOPENIA: ICD-10-CM

## 2021-12-13 DIAGNOSIS — Z17.0 MALIGNANT NEOPLASM OF OVERLAPPING SITES OF LEFT BREAST IN FEMALE, ESTROGEN RECEPTOR POSITIVE (HCC): Primary | ICD-10-CM

## 2021-12-13 DIAGNOSIS — R11.0 CHEMOTHERAPY-INDUCED NAUSEA: Primary | ICD-10-CM

## 2021-12-13 DIAGNOSIS — D69.59 CHEMOTHERAPY-INDUCED THROMBOCYTOPENIA: ICD-10-CM

## 2021-12-13 DIAGNOSIS — T45.1X5A CHEMOTHERAPY-INDUCED NAUSEA: Primary | ICD-10-CM

## 2021-12-13 DIAGNOSIS — C50.812 MALIGNANT NEOPLASM OF OVERLAPPING SITES OF LEFT BREAST IN FEMALE, ESTROGEN RECEPTOR POSITIVE (HCC): Primary | ICD-10-CM

## 2021-12-13 DIAGNOSIS — Z17.0 MALIGNANT NEOPLASM OF OVERLAPPING SITES OF LEFT BREAST IN FEMALE, ESTROGEN RECEPTOR POSITIVE (HCC): ICD-10-CM

## 2021-12-13 DIAGNOSIS — Z51.11 CHEMOTHERAPY MANAGEMENT, ENCOUNTER FOR: ICD-10-CM

## 2021-12-13 DIAGNOSIS — Z15.01 BRCA2 POSITIVE: ICD-10-CM

## 2021-12-13 DIAGNOSIS — Z15.09 BRCA2 POSITIVE: ICD-10-CM

## 2021-12-13 PROCEDURE — 96377 APPLICATON ON-BODY INJECTOR: CPT

## 2021-12-13 PROCEDURE — 99214 OFFICE O/P EST MOD 30 MIN: CPT | Performed by: CLINICAL NURSE SPECIALIST

## 2021-12-13 PROCEDURE — 96375 TX/PRO/DX INJ NEW DRUG ADDON: CPT

## 2021-12-13 PROCEDURE — 96367 TX/PROPH/DG ADDL SEQ IV INF: CPT

## 2021-12-13 PROCEDURE — 96417 CHEMO IV INFUS EACH ADDL SEQ: CPT

## 2021-12-13 PROCEDURE — 96413 CHEMO IV INFUSION 1 HR: CPT

## 2021-12-13 NOTE — PROGRESS NOTES
Attempted to call patient in regards to when she was completed with her chemo since the last treatment was deferred and called her at her request from last week when we spoke on the phone.      Will attempt to call her back tomorrow, Tuesday December 14, 20

## 2021-12-13 NOTE — PROGRESS NOTES
ANP Visit Note    Patient Name: Danis Feng   YOB: 1990   Medical Record Number: [id number]   CSN: [id number]   Date of visit: 12/13/2021   None Pcp   No primary care provider on file.      Chief Complaint/Reason for Visit:  Patient presen transferred care to Dr Zoie Garcia     History of Present Illness:  Jacobo Espinoza presents today for possible treatment, she denies any bleeding. She overall is feeling well. Anxious about counts and ability to receive chemo.      Problem List:  Patient Active Proble Oral Tablet Dispersible, Take 8 mg by mouth every 8 (eight) hours as needed for Nausea., Disp: , Rfl:   •  prochlorperazine 5 MG Oral, Take 5 mg by mouth every 6 (six) hours as needed for Nausea., Disp: , Rfl:   •  loratadine 10 MG Oral Tab, Take 10 mg by Neutrophil Absolute Prelim 1.60 1.50 - 7.70 x10 (3) uL    Neutrophil Absolute 1.60 1.50 - 7.70 x10(3) uL    Lymphocyte Absolute 1.15 1.00 - 4.00 x10(3) uL    Monocyte Absolute 0.30 0.10 - 1.00 x10(3) uL    Eosinophil Absolute 0.10 0.00 - 0.70 x10(3) uL

## 2021-12-14 ENCOUNTER — NURSE NAVIGATOR ENCOUNTER (OUTPATIENT)
Dept: HEMATOLOGY/ONCOLOGY | Facility: HOSPITAL | Age: 31
End: 2021-12-14

## 2021-12-14 NOTE — PROGRESS NOTES
Called patient in regards to discussing with her about when her last chemotherapy treatment would be and when to schedule her post-treatment consultation with .  Patient stated that her last chemotherapy treatment is scheduled for January 5, 2022 a

## 2021-12-15 ENCOUNTER — NURSE NAVIGATOR ENCOUNTER (OUTPATIENT)
Dept: HEMATOLOGY/ONCOLOGY | Facility: HOSPITAL | Age: 31
End: 2021-12-15

## 2021-12-15 ENCOUNTER — OFFICE VISIT (OUTPATIENT)
Dept: HEMATOLOGY/ONCOLOGY | Age: 31
End: 2021-12-15
Attending: SPECIALIST
Payer: COMMERCIAL

## 2021-12-15 VITALS
HEART RATE: 76 BPM | TEMPERATURE: 99 F | DIASTOLIC BLOOD PRESSURE: 84 MMHG | OXYGEN SATURATION: 100 % | RESPIRATION RATE: 16 BRPM | SYSTOLIC BLOOD PRESSURE: 128 MMHG

## 2021-12-15 DIAGNOSIS — Z17.0 MALIGNANT NEOPLASM OF OVERLAPPING SITES OF LEFT BREAST IN FEMALE, ESTROGEN RECEPTOR POSITIVE (HCC): ICD-10-CM

## 2021-12-15 DIAGNOSIS — C50.812 MALIGNANT NEOPLASM OF OVERLAPPING SITES OF LEFT BREAST IN FEMALE, ESTROGEN RECEPTOR POSITIVE (HCC): ICD-10-CM

## 2021-12-15 DIAGNOSIS — R30.0 DYSURIA: Primary | ICD-10-CM

## 2021-12-15 PROCEDURE — 81003 URINALYSIS AUTO W/O SCOPE: CPT

## 2021-12-15 PROCEDURE — 96360 HYDRATION IV INFUSION INIT: CPT

## 2021-12-15 NOTE — PROGRESS NOTES
Education Record    Learner:  Patient    Disease / Diagnosis:pt here for fluids    Barriers / Limitations:  None    Method:  Brief focused, printed material and  reinforcement    General Topics:  Plan of care reviewed    Outcome:pt tolerated fluids.  Pt c/o

## 2021-12-15 NOTE — PROGRESS NOTES
Called patient in regards to offering an appointment for post-treatment consultation for breast cancer with Dr. Lakeshia Restrepo for January 11, 2021 at [address] [facility] in [place].  Patient accepted appointment and messaged was sent to Dr. Chapincito Tracy team to schedule patient's

## 2021-12-17 ENCOUNTER — OFFICE VISIT (OUTPATIENT)
Dept: HEMATOLOGY/ONCOLOGY | Age: 31
End: 2021-12-17
Attending: SPECIALIST
Payer: COMMERCIAL

## 2021-12-17 VITALS
HEART RATE: 89 BPM | SYSTOLIC BLOOD PRESSURE: 131 MMHG | TEMPERATURE: 100 F | DIASTOLIC BLOOD PRESSURE: 79 MMHG | OXYGEN SATURATION: 99 % | RESPIRATION RATE: 16 BRPM

## 2021-12-17 DIAGNOSIS — Z17.0 MALIGNANT NEOPLASM OF OVERLAPPING SITES OF LEFT BREAST IN FEMALE, ESTROGEN RECEPTOR POSITIVE (HCC): Primary | ICD-10-CM

## 2021-12-17 DIAGNOSIS — C50.812 MALIGNANT NEOPLASM OF OVERLAPPING SITES OF LEFT BREAST IN FEMALE, ESTROGEN RECEPTOR POSITIVE (HCC): Primary | ICD-10-CM

## 2021-12-17 PROCEDURE — 96360 HYDRATION IV INFUSION INIT: CPT

## 2021-12-22 ENCOUNTER — OFFICE VISIT (OUTPATIENT)
Dept: HEMATOLOGY/ONCOLOGY | Age: 31
End: 2021-12-22
Attending: SPECIALIST
Payer: COMMERCIAL

## 2021-12-22 VITALS
DIASTOLIC BLOOD PRESSURE: 81 MMHG | SYSTOLIC BLOOD PRESSURE: 125 MMHG | RESPIRATION RATE: 16 BRPM | OXYGEN SATURATION: 100 % | HEART RATE: 82 BPM | TEMPERATURE: 99 F

## 2021-12-22 DIAGNOSIS — Z17.0 MALIGNANT NEOPLASM OF OVERLAPPING SITES OF LEFT BREAST IN FEMALE, ESTROGEN RECEPTOR POSITIVE (HCC): Primary | ICD-10-CM

## 2021-12-22 DIAGNOSIS — C50.812 MALIGNANT NEOPLASM OF OVERLAPPING SITES OF LEFT BREAST IN FEMALE, ESTROGEN RECEPTOR POSITIVE (HCC): Primary | ICD-10-CM

## 2021-12-22 PROCEDURE — 96360 HYDRATION IV INFUSION INIT: CPT

## 2021-12-22 NOTE — PROGRESS NOTES
Education Record    Learner:  Patient    Disease / Diagnosis: Patient here for IVF    Barriers / Limitations:  None    Method:  Brief focused, printed material and  reinforcement    General Topics:  Plan of care reviewed    Outcome: Patient ambulatory with

## 2022-01-04 ENCOUNTER — NURSE NAVIGATOR ENCOUNTER (OUTPATIENT)
Dept: HEMATOLOGY/ONCOLOGY | Facility: HOSPITAL | Age: 32
End: 2022-01-04

## 2022-01-04 RX ORDER — POTASSIUM CHLORIDE 20 MEQ/1
TABLET, EXTENDED RELEASE ORAL
Qty: 30 TABLET | Refills: 2 | Status: SHIPPED | OUTPATIENT
Start: 2022-01-04 | End: 2022-06-14

## 2022-01-04 RX ORDER — DEXAMETHASONE 4 MG/1
TABLET ORAL
Qty: 12 TABLET | Refills: 0 | Status: SHIPPED | OUTPATIENT
Start: 2022-01-04 | End: 2022-02-10 | Stop reason: ALTCHOICE

## 2022-01-04 NOTE — PROGRESS NOTES
[facility] Progress Note      Patient Name: Elizabeth Llanos   YOB: 1990  Medical Record Number: [id number]  Attending Physician: Carla Domingo M.D.      Date of Visit: 1/5/2022      Chief Complaint  Invasive ductal ca suspicious enhancement involving the skin along the inferior breast but not the chest wall, a 0.9 cm enhancing mass with enhancement at the 12 o'clock position, a 1.1 cm enhancing mass at the 6 o'clock position, a 0.7 cm indeterminate mass at the 8 o'clock CHLORIDE 20 MEQ Oral Tab CR, TAKE 1 TABLET(20 MEQ) BY MOUTH DAILY, Disp: 30 tablet, Rfl: 2  ondansetron (ZOFRAN) 8 MG tablet, Take 1 tablet (8 mg total) by mouth every 8 (eight) hours as needed for Nausea., Disp: 30 tablet, Rfl: 3  ALPRAZolam 0.25 MG Oral Calcium, Total 8.8 8.5 - 10.1 mg/dL    Calculated Osmolality 283 275 - 295 mOsm/kg    GFR, Non- 118 >=60    GFR, -American 135 >=60    AST 38 (H) 15 - 37 U/L    ALT 82 (H) 13 - 56 U/L    Alkaline Phosphatase 65 37 - 98 U/L    Bilirub will depend on recovery her counts. Patient will require adjuvant radiotherapy. As her disease is hormone receptor positive, adjuvant endocrine therapy for at least 5 years will be recommended.  She is also a candidate for adjuvant PARP

## 2022-01-04 NOTE — PROGRESS NOTES
Spoke with patient regarding plan of care. Pt has last chemotherapy tomorrow. She is meeting with Dr. Jasmin Aaron on 1/11 to discuss surgical planning. She is planning to proceed with B mastectomy due to genetic testing results.  Pt would like to meet with jeevan

## 2022-01-05 ENCOUNTER — OFFICE VISIT (OUTPATIENT)
Dept: HEMATOLOGY/ONCOLOGY | Age: 32
End: 2022-01-05
Attending: SPECIALIST
Payer: COMMERCIAL

## 2022-01-05 VITALS
HEART RATE: 96 BPM | WEIGHT: 150.5 LBS | BODY MASS INDEX: 24.19 KG/M2 | RESPIRATION RATE: 18 BRPM | OXYGEN SATURATION: 99 % | HEIGHT: 65.98 IN | DIASTOLIC BLOOD PRESSURE: 91 MMHG | TEMPERATURE: 99 F | SYSTOLIC BLOOD PRESSURE: 146 MMHG

## 2022-01-05 DIAGNOSIS — Z17.0 MALIGNANT NEOPLASM OF OVERLAPPING SITES OF LEFT BREAST IN FEMALE, ESTROGEN RECEPTOR POSITIVE (HCC): Primary | ICD-10-CM

## 2022-01-05 DIAGNOSIS — Z51.11 CHEMOTHERAPY MANAGEMENT, ENCOUNTER FOR: ICD-10-CM

## 2022-01-05 DIAGNOSIS — T45.1X5A CHEMOTHERAPY-INDUCED THROMBOCYTOPENIA: ICD-10-CM

## 2022-01-05 DIAGNOSIS — C50.812 MALIGNANT NEOPLASM OF OVERLAPPING SITES OF LEFT BREAST IN FEMALE, ESTROGEN RECEPTOR POSITIVE (HCC): Primary | ICD-10-CM

## 2022-01-05 DIAGNOSIS — R19.00 PELVIC MASS: ICD-10-CM

## 2022-01-05 DIAGNOSIS — Z15.09 BRCA2 POSITIVE: ICD-10-CM

## 2022-01-05 DIAGNOSIS — Z15.01 BRCA2 POSITIVE: ICD-10-CM

## 2022-01-05 DIAGNOSIS — D69.59 CHEMOTHERAPY-INDUCED THROMBOCYTOPENIA: ICD-10-CM

## 2022-01-05 LAB
ALBUMIN SERPL-MCNC: 3.6 G/DL (ref 3.4–5)
ALBUMIN/GLOB SERPL: 1.1 {RATIO} (ref 1–2)
ALP LIVER SERPL-CCNC: 65 U/L
ALT SERPL-CCNC: 82 U/L
ANION GAP SERPL CALC-SCNC: 7 MMOL/L (ref 0–18)
AST SERPL-CCNC: 38 U/L (ref 15–37)
BASOPHILS # BLD AUTO: 0 X10(3) UL (ref 0–0.2)
BASOPHILS NFR BLD AUTO: 0 %
BILIRUB SERPL-MCNC: 0.6 MG/DL (ref 0.1–2)
BUN BLD-MCNC: 9 MG/DL (ref 7–18)
CALCIUM BLD-MCNC: 8.8 MG/DL (ref 8.5–10.1)
CHLORIDE SERPL-SCNC: 103 MMOL/L (ref 98–112)
CO2 SERPL-SCNC: 27 MMOL/L (ref 21–32)
CREAT BLD-MCNC: 0.67 MG/DL
EOSINOPHIL # BLD AUTO: 0.02 X10(3) UL (ref 0–0.7)
EOSINOPHIL NFR BLD AUTO: 0.7 %
ERYTHROCYTE [DISTWIDTH] IN BLOOD BY AUTOMATED COUNT: 17.3 %
GLOBULIN PLAS-MCNC: 3.4 G/DL (ref 2.8–4.4)
GLUCOSE BLD-MCNC: 102 MG/DL (ref 70–99)
HCT VFR BLD AUTO: 27.2 %
HGB BLD-MCNC: 9.4 G/DL
IMM GRANULOCYTES # BLD AUTO: 0.01 X10(3) UL (ref 0–1)
IMM GRANULOCYTES NFR BLD: 0.4 %
LYMPHOCYTES # BLD AUTO: 1.35 X10(3) UL (ref 1–4)
LYMPHOCYTES NFR BLD AUTO: 49.5 %
MCH RBC QN AUTO: 35.3 PG (ref 26–34)
MCHC RBC AUTO-ENTMCNC: 34.6 G/DL (ref 31–37)
MCV RBC AUTO: 102.3 FL
MONOCYTES # BLD AUTO: 0.2 X10(3) UL (ref 0.1–1)
MONOCYTES NFR BLD AUTO: 7.3 %
NEUTROPHILS # BLD AUTO: 1.15 X10 (3) UL (ref 1.5–7.7)
NEUTROPHILS # BLD AUTO: 1.15 X10(3) UL (ref 1.5–7.7)
NEUTROPHILS NFR BLD AUTO: 42.1 %
OSMOLALITY SERPL CALC.SUM OF ELEC: 283 MOSM/KG (ref 275–295)
PLATELET # BLD AUTO: 48 10(3)UL (ref 150–450)
POTASSIUM SERPL-SCNC: 3.3 MMOL/L (ref 3.5–5.1)
PROT SERPL-MCNC: 7 G/DL (ref 6.4–8.2)
RBC # BLD AUTO: 2.66 X10(6)UL
SODIUM SERPL-SCNC: 137 MMOL/L (ref 136–145)
WBC # BLD AUTO: 2.7 X10(3) UL (ref 4–11)

## 2022-01-05 PROCEDURE — 85025 COMPLETE CBC W/AUTO DIFF WBC: CPT

## 2022-01-05 PROCEDURE — 80053 COMPREHEN METABOLIC PANEL: CPT

## 2022-01-05 PROCEDURE — 99215 OFFICE O/P EST HI 40 MIN: CPT | Performed by: SPECIALIST

## 2022-01-05 PROCEDURE — 36591 DRAW BLOOD OFF VENOUS DEVICE: CPT

## 2022-01-05 NOTE — PROGRESS NOTES
Patient is here today for follow up with Cris Aragon for Breast Cancer. C6D1 TCHP. Patient denies pain. Stated fatigue, decreased appetite and nausea the first week post treatment. Emesis X 1. Constipation controlled with Laxatives.  Neuropathy fingers and b

## 2022-01-05 NOTE — PROGRESS NOTES
Patient not being treated today due to platelet count. Patient returning Monday to recheck labs, APN, and possible chemo.

## 2022-01-06 ENCOUNTER — HOSPITAL ENCOUNTER (OUTPATIENT)
Dept: ULTRASOUND IMAGING | Age: 32
Discharge: HOME | End: 2022-01-06
Attending: SPECIALIST
Payer: COMMERCIAL

## 2022-01-06 DIAGNOSIS — R19.00 PELVIC MASS: ICD-10-CM

## 2022-01-06 PROCEDURE — 76830 TRANSVAGINAL US NON-OB: CPT | Performed by: SPECIALIST

## 2022-01-06 PROCEDURE — 76856 US EXAM PELVIC COMPLETE: CPT | Performed by: SPECIALIST

## 2022-01-10 ENCOUNTER — OFFICE VISIT (OUTPATIENT)
Dept: HEMATOLOGY/ONCOLOGY | Age: 32
End: 2022-01-10
Attending: SPECIALIST
Payer: COMMERCIAL

## 2022-01-10 VITALS
SYSTOLIC BLOOD PRESSURE: 147 MMHG | OXYGEN SATURATION: 100 % | HEIGHT: 65.98 IN | TEMPERATURE: 99 F | HEART RATE: 109 BPM | DIASTOLIC BLOOD PRESSURE: 81 MMHG | RESPIRATION RATE: 18 BRPM | BODY MASS INDEX: 24.75 KG/M2 | WEIGHT: 154 LBS

## 2022-01-10 DIAGNOSIS — Z15.09 BRCA2 POSITIVE: ICD-10-CM

## 2022-01-10 DIAGNOSIS — Z17.0 MALIGNANT NEOPLASM OF OVERLAPPING SITES OF LEFT BREAST IN FEMALE, ESTROGEN RECEPTOR POSITIVE (HCC): Primary | ICD-10-CM

## 2022-01-10 DIAGNOSIS — T45.1X5A ANTINEOPLASTIC CHEMOTHERAPY INDUCED ANEMIA: ICD-10-CM

## 2022-01-10 DIAGNOSIS — Z17.0 MALIGNANT NEOPLASM OF OVERLAPPING SITES OF LEFT BREAST IN FEMALE, ESTROGEN RECEPTOR POSITIVE (HCC): ICD-10-CM

## 2022-01-10 DIAGNOSIS — D70.1 CHEMOTHERAPY INDUCED NEUTROPENIA (HCC): ICD-10-CM

## 2022-01-10 DIAGNOSIS — T45.1X5A CHEMOTHERAPY INDUCED NEUTROPENIA (HCC): ICD-10-CM

## 2022-01-10 DIAGNOSIS — Z15.01 BRCA2 POSITIVE: ICD-10-CM

## 2022-01-10 DIAGNOSIS — T45.1X5A CHEMOTHERAPY-INDUCED NAUSEA: ICD-10-CM

## 2022-01-10 DIAGNOSIS — C50.812 MALIGNANT NEOPLASM OF OVERLAPPING SITES OF LEFT BREAST IN FEMALE, ESTROGEN RECEPTOR POSITIVE (HCC): ICD-10-CM

## 2022-01-10 DIAGNOSIS — Z51.11 CHEMOTHERAPY MANAGEMENT, ENCOUNTER FOR: ICD-10-CM

## 2022-01-10 DIAGNOSIS — E87.6 HYPOKALEMIA: ICD-10-CM

## 2022-01-10 DIAGNOSIS — R11.0 CHEMOTHERAPY-INDUCED NAUSEA: ICD-10-CM

## 2022-01-10 DIAGNOSIS — D64.81 ANTINEOPLASTIC CHEMOTHERAPY INDUCED ANEMIA: ICD-10-CM

## 2022-01-10 DIAGNOSIS — D69.59 CHEMOTHERAPY-INDUCED THROMBOCYTOPENIA: ICD-10-CM

## 2022-01-10 DIAGNOSIS — C50.812 MALIGNANT NEOPLASM OF OVERLAPPING SITES OF LEFT BREAST IN FEMALE, ESTROGEN RECEPTOR POSITIVE (HCC): Primary | ICD-10-CM

## 2022-01-10 DIAGNOSIS — T45.1X5A CHEMOTHERAPY-INDUCED THROMBOCYTOPENIA: ICD-10-CM

## 2022-01-10 DIAGNOSIS — Z51.11 ENCOUNTER FOR CHEMOTHERAPY MANAGEMENT: Primary | ICD-10-CM

## 2022-01-10 LAB
BASOPHILS # BLD AUTO: 0 X10(3) UL (ref 0–0.2)
BASOPHILS NFR BLD AUTO: 0 %
EOSINOPHIL # BLD AUTO: 0.03 X10(3) UL (ref 0–0.7)
EOSINOPHIL NFR BLD AUTO: 1.2 %
ERYTHROCYTE [DISTWIDTH] IN BLOOD BY AUTOMATED COUNT: 16.4 %
HCT VFR BLD AUTO: 26.2 %
HGB BLD-MCNC: 8.9 G/DL
IMM GRANULOCYTES # BLD AUTO: 0 X10(3) UL (ref 0–1)
IMM GRANULOCYTES NFR BLD: 0 %
LYMPHOCYTES # BLD AUTO: 1.21 X10(3) UL (ref 1–4)
LYMPHOCYTES NFR BLD AUTO: 48.8 %
MCH RBC QN AUTO: 35 PG (ref 26–34)
MCHC RBC AUTO-ENTMCNC: 34 G/DL (ref 31–37)
MCV RBC AUTO: 103.1 FL
MONOCYTES # BLD AUTO: 0.24 X10(3) UL (ref 0.1–1)
MONOCYTES NFR BLD AUTO: 9.7 %
NEUTROPHILS # BLD AUTO: 1 X10 (3) UL (ref 1.5–7.7)
NEUTROPHILS # BLD AUTO: 1 X10(3) UL (ref 1.5–7.7)
NEUTROPHILS NFR BLD AUTO: 40.3 %
PLATELET # BLD AUTO: 72 10(3)UL (ref 150–450)
RBC # BLD AUTO: 2.54 X10(6)UL
WBC # BLD AUTO: 2.5 X10(3) UL (ref 4–11)

## 2022-01-10 PROCEDURE — 96377 APPLICATON ON-BODY INJECTOR: CPT

## 2022-01-10 PROCEDURE — 96375 TX/PRO/DX INJ NEW DRUG ADDON: CPT

## 2022-01-10 PROCEDURE — 96417 CHEMO IV INFUS EACH ADDL SEQ: CPT

## 2022-01-10 PROCEDURE — 85025 COMPLETE CBC W/AUTO DIFF WBC: CPT

## 2022-01-10 PROCEDURE — 99215 OFFICE O/P EST HI 40 MIN: CPT | Performed by: CLINICAL NURSE SPECIALIST

## 2022-01-10 PROCEDURE — 96413 CHEMO IV INFUSION 1 HR: CPT

## 2022-01-10 NOTE — PROGRESS NOTES
Pt here for APN visit and possible C6 TCHP. Pt held last week dt thrombocytopenia. Pt denies any complaints today.    Outpatient Oncology Care Plan  Problem list:  knowledge deficit    Problems related to:    chemotherapy  side effect of treatment    Interv

## 2022-01-10 NOTE — PATIENT INSTRUCTIONS
On-body Injector for Neulasta Patient Instructions       Your On-Body Injection device was applied on 1/10 @115pm    Your dose of medication will start on 1/11 415pm    You may remove this device on 1/11 615pm      Important information to remember about

## 2022-01-10 NOTE — PROGRESS NOTES
- Left hip pain likely secondary to trochanteric bursitis   - Patient counseled on condition and sent home with information to read  - Prescribed acetaminophen (TYLENOL) 500 mg tablet;  Take 2 tablets (1,000 mg total) by mouth 3 (three) times a day as needed for mild pain  - Consider adding x-ray to future visit if patient reports worsening pain despite treatment with acetaminophen Pt here for C6D1.   Arrives Ambulating independently, accompanied by Self          Modifications in dose or schedule: No    Pt denies current pregnancy     Frequency of blood return and site check throughout administration: Prior to administration   Dischar

## 2022-01-10 NOTE — PROGRESS NOTES
ANP Visit Note    Patient Name: Keagan Benz   YOB: 1990   Medical Record Number: [id number]   CSN: [id number]   Date of visit: 1/10/2022   None Pcp   No primary care provider on file.      Chief Complaint/Reason for Visit:  Patient present [facility] ADA   9/29/21: transferred care to Dr Tutu Butcher    History of Present Illness:  Cassy Leone is here today for labs and chemotherapy if labs are adequate. She was held last week due to pancytopenia. She denies any fevers or chills.  She is anxious to TABLET(20 MEQ) BY MOUTH DAILY, Disp: 30 tablet, Rfl: 2  •  ondansetron (ZOFRAN) 8 MG tablet, Take 1 tablet (8 mg total) by mouth every 8 (eight) hours as needed for Nausea., Disp: 30 tablet, Rfl: 3  •  ALPRAZolam 0.25 MG Oral Tab, Take 0.25 mg by mouth nig 1.00 (L) 1.50 - 7.70 x10 (3) uL    Neutrophil Absolute 1.00 (L) 1.50 - 7.70 x10(3) uL    Lymphocyte Absolute 1.21 1.00 - 4.00 x10(3) uL    Monocyte Absolute 0.24 0.10 - 1.00 x10(3) uL    Eosinophil Absolute 0.03 0.00 - 0.70 x10(3) uL    Basophil Absolute 0

## 2022-01-11 ENCOUNTER — OFFICE VISIT (OUTPATIENT)
Dept: SURGERY | Facility: CLINIC | Age: 32
End: 2022-01-11
Payer: COMMERCIAL

## 2022-01-11 ENCOUNTER — TELEPHONE (OUTPATIENT)
Dept: HEMATOLOGY/ONCOLOGY | Age: 32
End: 2022-01-11

## 2022-01-11 VITALS
DIASTOLIC BLOOD PRESSURE: 91 MMHG | WEIGHT: 151 LBS | HEIGHT: 67 IN | BODY MASS INDEX: 23.7 KG/M2 | HEART RATE: 82 BPM | SYSTOLIC BLOOD PRESSURE: 149 MMHG | OXYGEN SATURATION: 100 % | RESPIRATION RATE: 16 BRPM

## 2022-01-11 VITALS
DIASTOLIC BLOOD PRESSURE: 91 MMHG | WEIGHT: 151 LBS | BODY MASS INDEX: 23.7 KG/M2 | OXYGEN SATURATION: 100 % | SYSTOLIC BLOOD PRESSURE: 149 MMHG | RESPIRATION RATE: 16 BRPM | HEIGHT: 67 IN | HEART RATE: 82 BPM

## 2022-01-11 DIAGNOSIS — C50.812 MALIGNANT NEOPLASM OF OVERLAPPING SITES OF LEFT BREAST IN FEMALE, ESTROGEN RECEPTOR POSITIVE (HCC): Primary | ICD-10-CM

## 2022-01-11 DIAGNOSIS — Z17.0 MALIGNANT NEOPLASM OF OVERLAPPING SITES OF LEFT BREAST IN FEMALE, ESTROGEN RECEPTOR POSITIVE (HCC): Primary | ICD-10-CM

## 2022-01-11 PROCEDURE — 3077F SYST BP >= 140 MM HG: CPT | Performed by: SURGERY

## 2022-01-11 PROCEDURE — 3008F BODY MASS INDEX DOCD: CPT | Performed by: SURGERY

## 2022-01-11 PROCEDURE — 99243 OFF/OP CNSLTJ NEW/EST LOW 30: CPT | Performed by: SURGERY

## 2022-01-11 PROCEDURE — 3080F DIAST BP >= 90 MM HG: CPT | Performed by: SURGERY

## 2022-01-11 PROCEDURE — 99215 OFFICE O/P EST HI 40 MIN: CPT | Performed by: SURGERY

## 2022-01-11 NOTE — PATIENT INSTRUCTIONS
Dr. Thee Zee  Tel: [phone]  Fax: [facility]  [address] [place], [address] [place] [address]  [phone]     Surgery/Procedure: Bilateral nipple versus skin sparing mastectomies, left lymphoscintigraphy, left sentinel lymph n day before to confirm your procedure, give you the time you need to arrive by and directions on where to go. They begin making calls after 2pm, if you are not contacted by 4pm, please call the surgeon's office listed above.   11. Do not take any blood thinn

## 2022-01-11 NOTE — CONSULTS
New Patient Consultation    This is the first visit for this 32year old female who presents to discuss reconstructive options following surgery for breast cancer. History of Present Illness:    The patient is a 32year old female who presents with a lef hearing loss, change in vision, double vision, cataracts, glaucoma, nasal congestion, nosebleed, hoarseness, sore throat, or swollen glands. Respiratory:  The patient denies shortness of breath, cough, bloody cough, phlegm, asthma, or wheezing.     Vianey Finley Physical Exam:    BP (!) 149/91 (BP Location: Right arm, Patient Position: Sitting, Cuff Size: adult)   Pulse 82   Resp 16   Ht 1.702 m (5' 7\")   Wt 68.5 kg (151 lb)   LMP 08/09/2021   SpO2 100%   BMI 23.65 kg/m²     The patient is awake, alert, and delayed) and technique (implant-based versus autologous). Given the patient's limited autologous donor site, the majority of our discussion was focused on implant-based reconstruction.     Specifically, the nature and technique of tissue expander reconstru

## 2022-01-11 NOTE — TELEPHONE ENCOUNTER
Patient notified. She is on the schedule tomorrow 1/12/2022  for Hydration in the [facility]. Carmel Hooper

## 2022-01-11 NOTE — PROGRESS NOTES
Breast Surgery Surveillance    History of Present Illness:   Ms. Sabiha Canchola is a 32year old woman who presents with self detected left breast cancer. She reports she felt a lump in May 2021 with no other associated symptoms.   She denies any nipple completed neoadjuvant chemotherapy. She no longer feels any palpable mass in the area. She is here today for evaluation and recommendations for further therapy. History reviewed. No pertinent past medical history. History reviewed.  No pertinent drainage, earaches, nasal congestion, nose bleeds, snoring, pain in mouth/throat, hoarseness, change in voice, facial trauma.     Respiratory:  The patient denies chronic cough, phlegm, hemoptysis, pleurisy/chest pain, pneumonia, asthma, wheezing, difficult despair. Endocrine: There is no history of poor/slow wound healing, weight loss/gain, fertility or hormone problems, cold intolerance, thyroid disease. Allergic/Immunologic:  There is no history of hives, hay fever, angioedema or anaphylaxis. suspicious appearing rashes or lesions. Extremities: The extremities are without deformity, cyanosis or edema.     Impression:   Ms. Dino Gonzalez is a 32year old woman presents with a confirmed BRCA genetic mutation and status post neoadjuvant chem and possible complications of the procedure were explained to the patient and her family and she understood and agreed to the proposed plan. She was given ample opportunity for questions and those questions were answered to her satisfaction.  She has been

## 2022-01-11 NOTE — PATIENT INSTRUCTIONS
Surgeon:         Dr. Bala Arreola                                        Tel:        [phone]                                  Fax:        [phone]    Surgery/Procedure:  Immediate bilateral breast reconstruction with tissue expanders, acellular de develop any COVID-19 like symptoms, test positive, or have been exposed to COVID-19 prior to surgery. If COVID-19 positive history, symptoms included: ___________________________________________.     PCP to do surgical clearance H&P - gave Dr. Zonia Alegria

## 2022-01-11 NOTE — TELEPHONE ENCOUNTER
Seven Acevedo at [phone] states that she need fluids 2 days following Chemo. Dr. Manuel Navarrete pt.

## 2022-01-12 ENCOUNTER — OFFICE VISIT (OUTPATIENT)
Dept: HEMATOLOGY/ONCOLOGY | Age: 32
End: 2022-01-12
Attending: SPECIALIST
Payer: COMMERCIAL

## 2022-01-12 VITALS
RESPIRATION RATE: 18 BRPM | OXYGEN SATURATION: 99 % | HEART RATE: 93 BPM | SYSTOLIC BLOOD PRESSURE: 114 MMHG | DIASTOLIC BLOOD PRESSURE: 71 MMHG | TEMPERATURE: 99 F

## 2022-01-12 DIAGNOSIS — C50.812 MALIGNANT NEOPLASM OF OVERLAPPING SITES OF LEFT BREAST IN FEMALE, ESTROGEN RECEPTOR POSITIVE (HCC): Primary | ICD-10-CM

## 2022-01-12 DIAGNOSIS — Z17.0 MALIGNANT NEOPLASM OF OVERLAPPING SITES OF LEFT BREAST IN FEMALE, ESTROGEN RECEPTOR POSITIVE (HCC): Primary | ICD-10-CM

## 2022-01-12 PROCEDURE — 96360 HYDRATION IV INFUSION INIT: CPT

## 2022-01-12 NOTE — PROGRESS NOTES
Patient  Here for IVF    Education Record    Learner:  Patient    Disease / Diagnosis: Breast CA    Barriers / Limitations:  None    Method:  Brief focused, printed material and  reinforcement    General Topics:  Plan of care reviewed    Outcome:  Shows un

## 2022-01-14 ENCOUNTER — TELEPHONE (OUTPATIENT)
Dept: SURGERY | Facility: CLINIC | Age: 32
End: 2022-01-14

## 2022-01-14 DIAGNOSIS — C50.812 MALIGNANT NEOPLASM OF OVERLAPPING SITES OF LEFT BREAST IN FEMALE, ESTROGEN RECEPTOR POSITIVE (HCC): Primary | ICD-10-CM

## 2022-01-14 DIAGNOSIS — Z17.0 MALIGNANT NEOPLASM OF OVERLAPPING SITES OF LEFT BREAST IN FEMALE, ESTROGEN RECEPTOR POSITIVE (HCC): Primary | ICD-10-CM

## 2022-01-14 NOTE — TELEPHONE ENCOUNTER
Calling pt in regards to scheduling surgery. Informed pt that I have 02/17/2022 available at [facility] with Dr. Eloina Hilario. Pt verbalized understanding and in agreement with date and location. All questions answered.    Encouraged pt to call or

## 2022-01-19 ENCOUNTER — OFFICE VISIT (OUTPATIENT)
Dept: INTERNAL MEDICINE CLINIC | Facility: CLINIC | Age: 32
End: 2022-01-19
Payer: COMMERCIAL

## 2022-01-19 ENCOUNTER — TELEPHONE (OUTPATIENT)
Dept: INTERNAL MEDICINE CLINIC | Facility: CLINIC | Age: 32
End: 2022-01-19

## 2022-01-19 VITALS
RESPIRATION RATE: 18 BRPM | SYSTOLIC BLOOD PRESSURE: 124 MMHG | DIASTOLIC BLOOD PRESSURE: 82 MMHG | WEIGHT: 147 LBS | HEIGHT: 67 IN | HEART RATE: 82 BPM | BODY MASS INDEX: 23.07 KG/M2

## 2022-01-19 DIAGNOSIS — C50.812 MALIGNANT NEOPLASM OF OVERLAPPING SITES OF LEFT BREAST IN FEMALE, ESTROGEN RECEPTOR POSITIVE (HCC): ICD-10-CM

## 2022-01-19 DIAGNOSIS — Z17.0 MALIGNANT NEOPLASM OF OVERLAPPING SITES OF LEFT BREAST IN FEMALE, ESTROGEN RECEPTOR POSITIVE (HCC): ICD-10-CM

## 2022-01-19 DIAGNOSIS — Z01.818 PREOPERATIVE EXAMINATION: Primary | ICD-10-CM

## 2022-01-19 PROCEDURE — 3008F BODY MASS INDEX DOCD: CPT | Performed by: FAMILY MEDICINE

## 2022-01-19 PROCEDURE — 3074F SYST BP LT 130 MM HG: CPT | Performed by: FAMILY MEDICINE

## 2022-01-19 PROCEDURE — 3079F DIAST BP 80-89 MM HG: CPT | Performed by: FAMILY MEDICINE

## 2022-01-19 PROCEDURE — 99204 OFFICE O/P NEW MOD 45 MIN: CPT | Performed by: FAMILY MEDICINE

## 2022-01-19 NOTE — TELEPHONE ENCOUNTER
I saw patient today for pre-op. Please call Dr. Best Locke office to have them send over formal pre-op request for this patient. Surgery is scheduled for 2/17/22.

## 2022-01-19 NOTE — PROGRESS NOTES
Subjective:   Patient ID: Keagan Benz is a 32year old female.     HPI Here for preoperative exam for planned Bilateral nipple versus skin sparing mastectomies, left lymphoscintigraphy, left sentinel lymph node biopsy, possible left axillary lymph nod Psychiatric/Behavioral: Negative for dysphoric mood. The patient is not nervous/anxious.       Current Outpatient Medications   Medication Sig Dispense Refill   • dexamethasone (DECADRON) 4 MG tablet TAKE 2 TABLETS(8 MG) BY MOUTH TWICE DAILY FOR 3 DAYS 12 from surgical office. Heme/Onc will recheck labs prior to surgery. No orders of the defined types were placed in this encounter.       Meds This Visit:  Requested Prescriptions      No prescriptions requested or ordered in this encounter       Imaging &

## 2022-01-20 NOTE — TELEPHONE ENCOUNTER
Spoke with Anjana Fink at Dr. Antonietta Morris office and will be faxing over pre-op paperwork today.

## 2022-02-01 ENCOUNTER — TELEPHONE (OUTPATIENT)
Dept: GENERAL RADIOLOGY | Facility: HOSPITAL | Age: 32
End: 2022-02-01

## 2022-02-02 ENCOUNTER — APPOINTMENT (OUTPATIENT)
Dept: HEMATOLOGY/ONCOLOGY | Age: 32
End: 2022-02-02
Attending: SPECIALIST
Payer: COMMERCIAL

## 2022-02-02 ENCOUNTER — TELEPHONE (OUTPATIENT)
Dept: MAMMOGRAPHY | Facility: HOSPITAL | Age: 32
End: 2022-02-02

## 2022-02-02 ENCOUNTER — OFFICE VISIT (OUTPATIENT)
Dept: HEMATOLOGY/ONCOLOGY | Age: 32
End: 2022-02-02
Attending: SPECIALIST
Payer: COMMERCIAL

## 2022-02-02 NOTE — TELEPHONE ENCOUNTER
Spoke with Bismark Madrigal regarding [place] Lymph Node mapping which will be done in nuclear medicine department before mastectomy surgery scheduled for 2-17-22. Procedure explained and all questions answered. Verbal education about lymphedema given. Breast Care Coordinator to provide written information regarding mastectomy surgery, breast cancer and lymphedema. Pt verbalized understanding and had no further questions at this time.

## 2022-02-03 ENCOUNTER — OFFICE VISIT (OUTPATIENT)
Dept: HEMATOLOGY/ONCOLOGY | Age: 32
End: 2022-02-03
Attending: SPECIALIST
Payer: COMMERCIAL

## 2022-02-03 VITALS
SYSTOLIC BLOOD PRESSURE: 117 MMHG | BODY MASS INDEX: 23.87 KG/M2 | HEIGHT: 65.98 IN | RESPIRATION RATE: 16 BRPM | DIASTOLIC BLOOD PRESSURE: 84 MMHG | WEIGHT: 148.5 LBS | OXYGEN SATURATION: 100 % | HEART RATE: 80 BPM | TEMPERATURE: 100 F

## 2022-02-03 DIAGNOSIS — Z17.0 MALIGNANT NEOPLASM OF OVERLAPPING SITES OF LEFT BREAST IN FEMALE, ESTROGEN RECEPTOR POSITIVE (HCC): Primary | ICD-10-CM

## 2022-02-03 DIAGNOSIS — T45.1X5A ANTINEOPLASTIC CHEMOTHERAPY INDUCED ANEMIA: ICD-10-CM

## 2022-02-03 DIAGNOSIS — D69.59 CHEMOTHERAPY-INDUCED THROMBOCYTOPENIA: ICD-10-CM

## 2022-02-03 DIAGNOSIS — D70.1 LEUKOPENIA DUE TO ANTINEOPLASTIC CHEMOTHERAPY (HCC): ICD-10-CM

## 2022-02-03 DIAGNOSIS — E87.6 HYPOKALEMIA: ICD-10-CM

## 2022-02-03 DIAGNOSIS — C50.812 MALIGNANT NEOPLASM OF OVERLAPPING SITES OF LEFT BREAST IN FEMALE, ESTROGEN RECEPTOR POSITIVE (HCC): Primary | ICD-10-CM

## 2022-02-03 DIAGNOSIS — D64.81 ANTINEOPLASTIC CHEMOTHERAPY INDUCED ANEMIA: ICD-10-CM

## 2022-02-03 DIAGNOSIS — T45.1X5A CHEMOTHERAPY-INDUCED THROMBOCYTOPENIA: ICD-10-CM

## 2022-02-03 DIAGNOSIS — T45.1X5A LEUKOPENIA DUE TO ANTINEOPLASTIC CHEMOTHERAPY (HCC): ICD-10-CM

## 2022-02-03 LAB
ALBUMIN SERPL-MCNC: 3.8 G/DL (ref 3.4–5)
ALBUMIN/GLOB SERPL: 1.2 {RATIO} (ref 1–2)
ALP LIVER SERPL-CCNC: 57 U/L
ALT SERPL-CCNC: 42 U/L
ANION GAP SERPL CALC-SCNC: 4 MMOL/L (ref 0–18)
AST SERPL-CCNC: 20 U/L (ref 15–37)
BASOPHILS # BLD AUTO: 0.01 X10(3) UL (ref 0–0.2)
BASOPHILS NFR BLD AUTO: 0.4 %
BILIRUB SERPL-MCNC: 0.8 MG/DL (ref 0.1–2)
BUN BLD-MCNC: 12 MG/DL (ref 7–18)
CALCIUM BLD-MCNC: 8.7 MG/DL (ref 8.5–10.1)
CHLORIDE SERPL-SCNC: 107 MMOL/L (ref 98–112)
CO2 SERPL-SCNC: 29 MMOL/L (ref 21–32)
CREAT BLD-MCNC: 0.66 MG/DL
EOSINOPHIL # BLD AUTO: 0.03 X10(3) UL (ref 0–0.7)
EOSINOPHIL NFR BLD AUTO: 1.1 %
ERYTHROCYTE [DISTWIDTH] IN BLOOD BY AUTOMATED COUNT: 14.7 %
GLOBULIN PLAS-MCNC: 3.1 G/DL (ref 2.8–4.4)
GLUCOSE BLD-MCNC: 100 MG/DL (ref 70–99)
HCT VFR BLD AUTO: 27.6 %
HGB BLD-MCNC: 9.4 G/DL
IMM GRANULOCYTES # BLD AUTO: 0 X10(3) UL (ref 0–1)
IMM GRANULOCYTES NFR BLD: 0 %
LYMPHOCYTES # BLD AUTO: 1.32 X10(3) UL (ref 1–4)
LYMPHOCYTES NFR BLD AUTO: 47.5 %
MCH RBC QN AUTO: 35.9 PG (ref 26–34)
MCHC RBC AUTO-ENTMCNC: 34.1 G/DL (ref 31–37)
MCV RBC AUTO: 105.3 FL
MONOCYTES # BLD AUTO: 0.4 X10(3) UL (ref 0.1–1)
MONOCYTES NFR BLD AUTO: 14.4 %
NEUTROPHILS # BLD AUTO: 1.02 X10 (3) UL (ref 1.5–7.7)
NEUTROPHILS # BLD AUTO: 1.02 X10(3) UL (ref 1.5–7.7)
NEUTROPHILS NFR BLD AUTO: 36.6 %
OSMOLALITY SERPL CALC.SUM OF ELEC: 290 MOSM/KG (ref 275–295)
PLATELET # BLD AUTO: 80 10(3)UL (ref 150–450)
POTASSIUM SERPL-SCNC: 3.4 MMOL/L (ref 3.5–5.1)
PROT SERPL-MCNC: 6.9 G/DL (ref 6.4–8.2)
RBC # BLD AUTO: 2.62 X10(6)UL
SODIUM SERPL-SCNC: 140 MMOL/L (ref 136–145)
WBC # BLD AUTO: 2.8 X10(3) UL (ref 4–11)

## 2022-02-03 PROCEDURE — 99215 OFFICE O/P EST HI 40 MIN: CPT | Performed by: NURSE PRACTITIONER

## 2022-02-03 PROCEDURE — 96417 CHEMO IV INFUS EACH ADDL SEQ: CPT

## 2022-02-03 PROCEDURE — 80053 COMPREHEN METABOLIC PANEL: CPT

## 2022-02-03 PROCEDURE — 85025 COMPLETE CBC W/AUTO DIFF WBC: CPT

## 2022-02-03 PROCEDURE — 96413 CHEMO IV INFUSION 1 HR: CPT

## 2022-02-03 NOTE — PATIENT INSTRUCTIONS
take 2 tabs today (40mEq). she has them at home and just needs to add an extra dose.  after today, she can go back to 1 tab daily

## 2022-02-03 NOTE — PROGRESS NOTES
Pt here for C7D1.   Arrives Ambulating independently, accompanied by Self          Modifications in dose or schedule: No    Pt denies current pregnancy     Frequency of blood return and site check throughout administration: Prior to administration   Discharged to Home, Ambulating independently, accompanied by:Self    Outpatient Oncology Care Plan  Problem list:  knowledge deficit  Problems related to:    chemotherapy  Interventions:  provided general teaching  Expected outcomes:  understands plan of care  Progress towards outcome:  making progress    Education Record    Learner:  Patient  Barriers / Limitations:  None  Method:  Brief focused  Outcome:  Shows understanding  Comments:no c/o

## 2022-02-08 ENCOUNTER — ANESTHESIA EVENT (OUTPATIENT)
Dept: SURGERY | Facility: HOSPITAL | Age: 32
End: 2022-02-08
Payer: COMMERCIAL

## 2022-02-09 ENCOUNTER — TELEPHONE (OUTPATIENT)
Dept: HEMATOLOGY/ONCOLOGY | Facility: HOSPITAL | Age: 32
End: 2022-02-09

## 2022-02-09 ENCOUNTER — NURSE NAVIGATOR ENCOUNTER (OUTPATIENT)
Dept: HEMATOLOGY/ONCOLOGY | Facility: HOSPITAL | Age: 32
End: 2022-02-09

## 2022-02-09 PROBLEM — Z01.818 PRE-OP TESTING: Status: ACTIVE | Noted: 2022-02-09

## 2022-02-09 NOTE — TELEPHONE ENCOUNTER
Patient calling and is having SX on 2/17/2022. PT is requesting an order for labs- A CBC,  For pre Sx.   Stated she would have labs done on 2/14/2022    Please confirm with Patient

## 2022-02-09 NOTE — PROGRESS NOTES
Spoke with patient regarding pre operative mastectomy class. Registered patient for 2/16 at 11 am. Pt will phone with any other questions or concerns.

## 2022-02-14 ENCOUNTER — NURSE ONLY (OUTPATIENT)
Dept: HEMATOLOGY/ONCOLOGY | Age: 32
End: 2022-02-14
Attending: SPECIALIST
Payer: COMMERCIAL

## 2022-02-14 ENCOUNTER — LAB ENCOUNTER (OUTPATIENT)
Dept: LAB | Age: 32
End: 2022-02-14
Attending: SURGERY
Payer: COMMERCIAL

## 2022-02-14 ENCOUNTER — TELEPHONE (OUTPATIENT)
Dept: INTERNAL MEDICINE CLINIC | Facility: CLINIC | Age: 32
End: 2022-02-14

## 2022-02-14 ENCOUNTER — TELEPHONE (OUTPATIENT)
Dept: HEMATOLOGY/ONCOLOGY | Facility: HOSPITAL | Age: 32
End: 2022-02-14

## 2022-02-14 DIAGNOSIS — D70.1 LEUKOPENIA DUE TO ANTINEOPLASTIC CHEMOTHERAPY (HCC): ICD-10-CM

## 2022-02-14 DIAGNOSIS — C50.812 MALIGNANT NEOPLASM OF OVERLAPPING SITES OF LEFT BREAST IN FEMALE, ESTROGEN RECEPTOR POSITIVE (HCC): Primary | ICD-10-CM

## 2022-02-14 DIAGNOSIS — Z01.818 PRE-OP TESTING: ICD-10-CM

## 2022-02-14 DIAGNOSIS — Z17.0 MALIGNANT NEOPLASM OF OVERLAPPING SITES OF LEFT BREAST IN FEMALE, ESTROGEN RECEPTOR POSITIVE (HCC): Primary | ICD-10-CM

## 2022-02-14 DIAGNOSIS — T45.1X5A CHEMOTHERAPY-INDUCED THROMBOCYTOPENIA: ICD-10-CM

## 2022-02-14 DIAGNOSIS — D69.59 CHEMOTHERAPY-INDUCED THROMBOCYTOPENIA: ICD-10-CM

## 2022-02-14 DIAGNOSIS — T45.1X5A LEUKOPENIA DUE TO ANTINEOPLASTIC CHEMOTHERAPY (HCC): ICD-10-CM

## 2022-02-14 LAB
BASOPHILS # BLD AUTO: 0.01 X10(3) UL (ref 0–0.2)
BASOPHILS NFR BLD AUTO: 0.4 %
EOSINOPHIL # BLD AUTO: 0.05 X10(3) UL (ref 0–0.7)
EOSINOPHIL NFR BLD AUTO: 2.2 %
ERYTHROCYTE [DISTWIDTH] IN BLOOD BY AUTOMATED COUNT: 12.7 %
HCT VFR BLD AUTO: 30.3 %
HGB BLD-MCNC: 10.3 G/DL
IMM GRANULOCYTES # BLD AUTO: 0 X10(3) UL (ref 0–1)
IMM GRANULOCYTES NFR BLD: 0 %
LYMPHOCYTES # BLD AUTO: 1.23 X10(3) UL (ref 1–4)
LYMPHOCYTES NFR BLD AUTO: 54.9 %
MCH RBC QN AUTO: 35.3 PG (ref 26–34)
MCHC RBC AUTO-ENTMCNC: 34 G/DL (ref 31–37)
MCV RBC AUTO: 103.8 FL
MONOCYTES # BLD AUTO: 0.16 X10(3) UL (ref 0.1–1)
MONOCYTES NFR BLD AUTO: 7.1 %
NEUTROPHILS # BLD AUTO: 0.79 X10 (3) UL (ref 1.5–7.7)
NEUTROPHILS # BLD AUTO: 0.79 X10(3) UL (ref 1.5–7.7)
NEUTROPHILS NFR BLD AUTO: 35.4 %
PLATELET # BLD AUTO: 107 10(3)UL (ref 150–450)
RBC # BLD AUTO: 2.92 X10(6)UL
WBC # BLD AUTO: 2.2 X10(3) UL (ref 4–11)

## 2022-02-14 PROCEDURE — 96372 THER/PROPH/DIAG INJ SC/IM: CPT

## 2022-02-14 PROCEDURE — 85025 COMPLETE CBC W/AUTO DIFF WBC: CPT

## 2022-02-14 PROCEDURE — 36591 DRAW BLOOD OFF VENOUS DEVICE: CPT

## 2022-02-14 NOTE — TELEPHONE ENCOUNTER
Please fax my H&P (attached to pre-op paperwork in my folder) to appropriate location for pre-op. No labs/EKG. Surgery is 2/17/22.

## 2022-02-14 NOTE — TELEPHONE ENCOUNTER
MD Lino Tsai, RN  Please call patient. Let her know that her white count is still low. Want her to come in today for neulasta shot so that her counts will be OK for surgery this week. Mely Lincoln will repeat labs on day of surgery to confirm that it is still OK to proceed. Patient notified - per Filipe Rhodes patient to go back to cancer center now for Neulasta injection.

## 2022-02-15 LAB — SARS-COV-2 RNA RESP QL NAA+PROBE: NOT DETECTED

## 2022-02-16 PROCEDURE — 85025 COMPLETE CBC W/AUTO DIFF WBC: CPT | Performed by: PHYSICIAN ASSISTANT

## 2022-02-16 PROCEDURE — 85027 COMPLETE CBC AUTOMATED: CPT | Performed by: PHYSICIAN ASSISTANT

## 2022-02-16 PROCEDURE — 85007 BL SMEAR W/DIFF WBC COUNT: CPT | Performed by: PHYSICIAN ASSISTANT

## 2022-02-17 ENCOUNTER — HOSPITAL ENCOUNTER (OUTPATIENT)
Dept: NUCLEAR MEDICINE | Facility: HOSPITAL | Age: 32
Discharge: HOME | End: 2022-02-17
Attending: SURGERY
Payer: COMMERCIAL

## 2022-02-17 ENCOUNTER — ANESTHESIA (OUTPATIENT)
Dept: SURGERY | Facility: HOSPITAL | Age: 32
End: 2022-02-17
Payer: COMMERCIAL

## 2022-02-17 ENCOUNTER — HOSPITAL ENCOUNTER (OUTPATIENT)
Facility: HOSPITAL | Age: 32
Setting detail: HOSPITAL OUTPATIENT SURGERY
Discharge: HOME | End: 2022-02-17
Attending: SURGERY | Admitting: SURGERY
Payer: COMMERCIAL

## 2022-02-17 VITALS
WEIGHT: 147.06 LBS | TEMPERATURE: 98 F | SYSTOLIC BLOOD PRESSURE: 134 MMHG | DIASTOLIC BLOOD PRESSURE: 74 MMHG | RESPIRATION RATE: 18 BRPM | BODY MASS INDEX: 23.64 KG/M2 | HEIGHT: 66 IN | HEART RATE: 89 BPM | OXYGEN SATURATION: 99 %

## 2022-02-17 DIAGNOSIS — C50.812 MALIGNANT NEOPLASM OF OVERLAPPING SITES OF LEFT BREAST IN FEMALE, ESTROGEN RECEPTOR POSITIVE (HCC): ICD-10-CM

## 2022-02-17 DIAGNOSIS — Z17.0 MALIGNANT NEOPLASM OF OVERLAPPING SITES OF LEFT BREAST IN FEMALE, ESTROGEN RECEPTOR POSITIVE (HCC): ICD-10-CM

## 2022-02-17 DIAGNOSIS — Z01.818 PRE-OP TESTING: Primary | ICD-10-CM

## 2022-02-17 LAB
B-HCG UR QL: NEGATIVE
BASOPHILS # BLD: 0 X10(3) UL (ref 0–0.2)
BASOPHILS NFR BLD: 0 %
EOSINOPHIL # BLD: 0 X10(3) UL (ref 0–0.7)
EOSINOPHIL NFR BLD: 0 %
HCT VFR BLD AUTO: 32.4 %
HGB BLD-MCNC: 10.5 G/DL
LYMPHOCYTES NFR BLD: 12 %
LYMPHOCYTES NFR BLD: 3.13 X10(3) UL (ref 1–4)
MCH RBC QN AUTO: 34.8 PG (ref 26–34)
MCHC RBC AUTO-ENTMCNC: 32.4 G/DL (ref 31–37)
MCV RBC AUTO: 107.3 FL
MONOCYTES # BLD: 1.57 X10(3) UL (ref 0.1–1)
MONOCYTES NFR BLD: 6 %
MORPHOLOGY: NORMAL
NEUTROPHILS NFR BLD: 73 %
NEUTS BAND NFR BLD: 9 %
NEUTS HYPERSEG # BLD: 21.4 X10(3) UL (ref 1.5–7.7)
PLATELET # BLD AUTO: 81 10(3)UL (ref 150–450)
PLATELET MORPHOLOGY: NORMAL
RBC # BLD AUTO: 3.02 X10(6)UL
TOTAL CELLS COUNTED BLD: 100

## 2022-02-17 PROCEDURE — 0HTV0ZZ RESECTION OF BILATERAL BREAST, OPEN APPROACH: ICD-10-PCS | Performed by: SURGERY

## 2022-02-17 PROCEDURE — 0HHV0NZ INSERTION OF TISSUE EXPANDER INTO BILATERAL BREAST, OPEN APPROACH: ICD-10-PCS | Performed by: SURGERY

## 2022-02-17 PROCEDURE — 88307 TISSUE EXAM BY PATHOLOGIST: CPT | Performed by: SURGERY

## 2022-02-17 PROCEDURE — 88331 PATH CONSLTJ SURG 1 BLK 1SPC: CPT | Performed by: SURGERY

## 2022-02-17 PROCEDURE — 81025 URINE PREGNANCY TEST: CPT

## 2022-02-17 PROCEDURE — 07B60ZX EXCISION OF LEFT AXILLARY LYMPHATIC, OPEN APPROACH, DIAGNOSTIC: ICD-10-PCS | Performed by: SURGERY

## 2022-02-17 PROCEDURE — 78195 LYMPH SYSTEM IMAGING: CPT | Performed by: SURGERY

## 2022-02-17 PROCEDURE — 0HUV0KZ SUPPLEMENT BILATERAL BREAST WITH NONAUTOLOGOUS TISSUE SUBSTITUTE, OPEN APPROACH: ICD-10-PCS | Performed by: SURGERY

## 2022-02-17 PROCEDURE — 76942 ECHO GUIDE FOR BIOPSY: CPT | Performed by: ANESTHESIOLOGY

## 2022-02-17 DEVICE — IMPLANTABLE DEVICE
Type: IMPLANTABLE DEVICE | Site: BREAST | Status: NON-FUNCTIONAL
Removed: 2023-12-14

## 2022-02-17 DEVICE — MATRIX ALLODERM SELECT: Type: IMPLANTABLE DEVICE | Site: BREAST | Status: FUNCTIONAL

## 2022-02-17 DEVICE — IMPLANTABLE DEVICE: Type: IMPLANTABLE DEVICE | Site: BREAST | Status: FUNCTIONAL

## 2022-02-17 RX ORDER — MEPERIDINE HYDROCHLORIDE 25 MG/ML
12.5 INJECTION INTRAMUSCULAR; INTRAVENOUS; SUBCUTANEOUS AS NEEDED
Status: DISCONTINUED | OUTPATIENT
Start: 2022-02-17 | End: 2022-02-17

## 2022-02-17 RX ORDER — ONDANSETRON 4 MG/1
4 TABLET, FILM COATED ORAL EVERY 8 HOURS PRN
Qty: 30 TABLET | Refills: 0 | Status: SHIPPED | OUTPATIENT
Start: 2022-02-17

## 2022-02-17 RX ORDER — HYDROMORPHONE HYDROCHLORIDE 1 MG/ML
INJECTION, SOLUTION INTRAMUSCULAR; INTRAVENOUS; SUBCUTANEOUS
Status: COMPLETED
Start: 2022-02-17 | End: 2022-02-17

## 2022-02-17 RX ORDER — ACETAMINOPHEN 500 MG
1000 TABLET ORAL ONCE
Status: DISCONTINUED | OUTPATIENT
Start: 2022-02-17 | End: 2022-02-17 | Stop reason: HOSPADM

## 2022-02-17 RX ORDER — NALOXONE HYDROCHLORIDE 0.4 MG/ML
80 INJECTION, SOLUTION INTRAMUSCULAR; INTRAVENOUS; SUBCUTANEOUS AS NEEDED
Status: DISCONTINUED | OUTPATIENT
Start: 2022-02-17 | End: 2022-02-17

## 2022-02-17 RX ORDER — ONDANSETRON 2 MG/ML
INJECTION INTRAMUSCULAR; INTRAVENOUS AS NEEDED
Status: DISCONTINUED | OUTPATIENT
Start: 2022-02-17 | End: 2022-02-17 | Stop reason: SURG

## 2022-02-17 RX ORDER — ONDANSETRON 2 MG/ML
4 INJECTION INTRAMUSCULAR; INTRAVENOUS AS NEEDED
Status: DISCONTINUED | OUTPATIENT
Start: 2022-02-17 | End: 2022-02-17

## 2022-02-17 RX ORDER — DEXAMETHASONE SODIUM PHOSPHATE 4 MG/ML
8 VIAL (ML) INJECTION AS NEEDED
Status: DISCONTINUED | OUTPATIENT
Start: 2022-02-17 | End: 2022-02-17

## 2022-02-17 RX ORDER — CEFAZOLIN SODIUM/WATER 2 G/20 ML
2 SYRINGE (ML) INTRAVENOUS ONCE
Status: COMPLETED | OUTPATIENT
Start: 2022-02-17 | End: 2022-02-17

## 2022-02-17 RX ORDER — MIDAZOLAM HYDROCHLORIDE 1 MG/ML
INJECTION INTRAMUSCULAR; INTRAVENOUS AS NEEDED
Status: DISCONTINUED | OUTPATIENT
Start: 2022-02-17 | End: 2022-02-17 | Stop reason: SURG

## 2022-02-17 RX ORDER — LIDOCAINE AND PRILOCAINE 25; 25 MG/G; MG/G
CREAM TOPICAL ONCE
Status: COMPLETED | OUTPATIENT
Start: 2022-02-17 | End: 2022-02-17

## 2022-02-17 RX ORDER — DIAZEPAM 5 MG/1
5 TABLET ORAL AS NEEDED
Status: DISCONTINUED | OUTPATIENT
Start: 2022-02-17 | End: 2022-02-17 | Stop reason: HOSPADM

## 2022-02-17 RX ORDER — CEPHALEXIN 500 MG/1
500 CAPSULE ORAL 4 TIMES DAILY
Qty: 40 CAPSULE | Refills: 2 | Status: SHIPPED | OUTPATIENT
Start: 2022-02-17 | End: 2022-02-27

## 2022-02-17 RX ORDER — EPHEDRINE SULFATE 50 MG/ML
INJECTION INTRAVENOUS AS NEEDED
Status: DISCONTINUED | OUTPATIENT
Start: 2022-02-17 | End: 2022-02-17 | Stop reason: SURG

## 2022-02-17 RX ORDER — GLYCOPYRROLATE 0.2 MG/ML
INJECTION, SOLUTION INTRAMUSCULAR; INTRAVENOUS AS NEEDED
Status: DISCONTINUED | OUTPATIENT
Start: 2022-02-17 | End: 2022-02-17 | Stop reason: SURG

## 2022-02-17 RX ORDER — SODIUM CHLORIDE, SODIUM LACTATE, POTASSIUM CHLORIDE, CALCIUM CHLORIDE 600; 310; 30; 20 MG/100ML; MG/100ML; MG/100ML; MG/100ML
INJECTION, SOLUTION INTRAVENOUS CONTINUOUS
Status: DISCONTINUED | OUTPATIENT
Start: 2022-02-17 | End: 2022-02-17

## 2022-02-17 RX ORDER — HYDROMORPHONE HYDROCHLORIDE 1 MG/ML
0.4 INJECTION, SOLUTION INTRAMUSCULAR; INTRAVENOUS; SUBCUTANEOUS EVERY 5 MIN PRN
Status: DISCONTINUED | OUTPATIENT
Start: 2022-02-17 | End: 2022-02-17

## 2022-02-17 RX ORDER — NEOSTIGMINE METHYLSULFATE 1 MG/ML
INJECTION INTRAVENOUS AS NEEDED
Status: DISCONTINUED | OUTPATIENT
Start: 2022-02-17 | End: 2022-02-17 | Stop reason: SURG

## 2022-02-17 RX ORDER — HYDROCODONE BITARTRATE AND ACETAMINOPHEN 5; 325 MG/1; MG/1
2 TABLET ORAL AS NEEDED
Status: DISCONTINUED | OUTPATIENT
Start: 2022-02-17 | End: 2022-02-17

## 2022-02-17 RX ORDER — ROPIVACAINE HYDROCHLORIDE 5 MG/ML
INJECTION, SOLUTION EPIDURAL; INFILTRATION; PERINEURAL AS NEEDED
Status: DISCONTINUED | OUTPATIENT
Start: 2022-02-17 | End: 2022-02-17 | Stop reason: SURG

## 2022-02-17 RX ORDER — METOCLOPRAMIDE HYDROCHLORIDE 5 MG/ML
10 INJECTION INTRAMUSCULAR; INTRAVENOUS AS NEEDED
Status: DISCONTINUED | OUTPATIENT
Start: 2022-02-17 | End: 2022-02-17

## 2022-02-17 RX ORDER — HYDROCODONE BITARTRATE AND ACETAMINOPHEN 5; 325 MG/1; MG/1
1-2 TABLET ORAL EVERY 4 HOURS PRN
Qty: 40 TABLET | Refills: 0 | Status: SHIPPED | OUTPATIENT
Start: 2022-02-17

## 2022-02-17 RX ORDER — ROCURONIUM BROMIDE 10 MG/ML
INJECTION, SOLUTION INTRAVENOUS AS NEEDED
Status: DISCONTINUED | OUTPATIENT
Start: 2022-02-17 | End: 2022-02-17 | Stop reason: SURG

## 2022-02-17 RX ORDER — DOCUSATE SODIUM 100 MG/1
100 CAPSULE, LIQUID FILLED ORAL 2 TIMES DAILY
Qty: 40 CAPSULE | Refills: 0 | Status: SHIPPED | OUTPATIENT
Start: 2022-02-17 | End: 2022-02-28 | Stop reason: ALTCHOICE

## 2022-02-17 RX ORDER — MIDAZOLAM HYDROCHLORIDE 1 MG/ML
1 INJECTION INTRAMUSCULAR; INTRAVENOUS EVERY 5 MIN PRN
Status: DISCONTINUED | OUTPATIENT
Start: 2022-02-17 | End: 2022-02-17

## 2022-02-17 RX ORDER — HYDROCODONE BITARTRATE AND ACETAMINOPHEN 5; 325 MG/1; MG/1
1 TABLET ORAL AS NEEDED
Status: DISCONTINUED | OUTPATIENT
Start: 2022-02-17 | End: 2022-02-17

## 2022-02-17 RX ORDER — DEXAMETHASONE SODIUM PHOSPHATE 4 MG/ML
VIAL (ML) INJECTION AS NEEDED
Status: DISCONTINUED | OUTPATIENT
Start: 2022-02-17 | End: 2022-02-17 | Stop reason: SURG

## 2022-02-17 RX ORDER — LIDOCAINE HYDROCHLORIDE 10 MG/ML
INJECTION, SOLUTION EPIDURAL; INFILTRATION; INTRACAUDAL; PERINEURAL AS NEEDED
Status: DISCONTINUED | OUTPATIENT
Start: 2022-02-17 | End: 2022-02-17 | Stop reason: SURG

## 2022-02-17 RX ADMIN — NEOSTIGMINE METHYLSULFATE 2 MG: 1 INJECTION INTRAVENOUS at 14:59:00

## 2022-02-17 RX ADMIN — CEFAZOLIN SODIUM/WATER 2 G: 2 G/20 ML SYRINGE (ML) INTRAVENOUS at 12:55:00

## 2022-02-17 RX ADMIN — EPHEDRINE SULFATE 10 MG: 50 INJECTION INTRAVENOUS at 12:55:00

## 2022-02-17 RX ADMIN — LIDOCAINE HYDROCHLORIDE 25 MG: 10 INJECTION, SOLUTION EPIDURAL; INFILTRATION; INTRACAUDAL; PERINEURAL at 12:41:00

## 2022-02-17 RX ADMIN — ROPIVACAINE HYDROCHLORIDE 15 ML: 5 INJECTION, SOLUTION EPIDURAL; INFILTRATION; PERINEURAL at 12:51:00

## 2022-02-17 RX ADMIN — SODIUM CHLORIDE, SODIUM LACTATE, POTASSIUM CHLORIDE, CALCIUM CHLORIDE: 600; 310; 30; 20 INJECTION, SOLUTION INTRAVENOUS at 15:24:00

## 2022-02-17 RX ADMIN — GLYCOPYRROLATE 0.4 MG: 0.2 INJECTION, SOLUTION INTRAMUSCULAR; INTRAVENOUS at 14:59:00

## 2022-02-17 RX ADMIN — ONDANSETRON 4 MG: 2 INJECTION INTRAMUSCULAR; INTRAVENOUS at 12:55:00

## 2022-02-17 RX ADMIN — SODIUM CHLORIDE, SODIUM LACTATE, POTASSIUM CHLORIDE, CALCIUM CHLORIDE: 600; 310; 30; 20 INJECTION, SOLUTION INTRAVENOUS at 12:41:00

## 2022-02-17 RX ADMIN — ROCURONIUM BROMIDE 50 MG: 10 INJECTION, SOLUTION INTRAVENOUS at 12:41:00

## 2022-02-17 RX ADMIN — DEXAMETHASONE SODIUM PHOSPHATE 4 MG: 4 MG/ML VIAL (ML) INJECTION at 12:55:00

## 2022-02-17 RX ADMIN — MIDAZOLAM HYDROCHLORIDE 2 MG: 1 INJECTION INTRAMUSCULAR; INTRAVENOUS at 12:35:00

## 2022-02-17 NOTE — BRIEF OP NOTE
Pre-Operative Diagnosis: Malignant neoplasm of overlapping sites of left breast in female, [facility] [C50.812, Z17.0]     Post-Operative Diagnosis: Malignant neoplasm of overlapping sites of left breast in female, estrogen receptor positive ([facility] [C50.812, Z17.0]      Procedure Performed:   Bilateral nipple versus skin sparing mastectomies, left sentinel lymph node biopsy, Kaushal Leap), Immediate bilateral breast reconstruction with tissue expanders, acellular dermal matrix (Ana)      Pre-pec, alloderm ADM, intra-op air fill    Surgeon(s) and Role:  Panel 1:     Paola Chavez MD - Primary  Panel 2:     * Adrien Perrin MD - Primary    Assistant(s):  Surgical Assistant.: Sarah Merino CSA  PA: Ale Witt, [place]     Surgical Findings: as dictated     Specimen: per Dr. Bernie Dixon     Estimated Blood Loss: Blood Output: 10 mL (2/17/2022  3:07 PM)    Erik Mcpherson PA-C  2/17/2022  3:21 PM

## 2022-02-17 NOTE — ANESTHESIA PROCEDURE NOTES
Airway  Date/Time: 2/17/2022 12:43 PM  Urgency: elective      General Information and Staff    Patient location during procedure: OR  Anesthesiologist: Tigist Cook MD  Performed: anesthesiologist     Indications and Patient Condition  Indications for airway management: anesthesia  Sedation level: deep  Preoxygenated: yes  Patient position: sniffing  Mask difficulty assessment: 1 - vent by mask    Final Airway Details  Final airway type: endotracheal airway      Successful airway: ETT  Cuffed: yes   Successful intubation technique: direct laryngoscopy  Facilitating devices/methods: intubating stylet  Endotracheal tube insertion site: oral  Blade: Ramana  Blade size: #3  ETT size (mm): 7.0    Cormack-Lehane Classification: grade I - full view of glottis  Placement verified by: chest auscultation and capnometry   Cuff volume (mL): 8  Measured from: lips  ETT to lips (cm): 20  Number of attempts at approach: 1

## 2022-02-17 NOTE — ANESTHESIA PROCEDURE NOTES
Regional Block  Performed by: Jayant Hopper MD  Authorized by: Jayant Hopper MD       General Information and Staff    Start Time:   Anesthesiologist: Jayant Hopper MD  Performed by: Anesthesiologist  Patient Location:  OR    Block Placement: Post Induction  Site Identification: real time ultrasound guided and image stored and retrievable    Block site/laterality marked before start: site marked  Reason for Block: at surgeon's request and post-op pain management    Preanesthetic Checklist: 2 patient identifers, IV checked, risks and benefits discussed, monitors and equipment checked, pre-op evaluation, timeout performed, anesthesia consent, sterile technique used, no prohibitive neurological deficits and no local skin infection at insertion site      Procedure Details    Patient Position:  Supine  Prep: ChloraPrep    Monitoring:  Cardiac monitor, continuous pulse ox and blood pressure cuff  Block Type:  PEC2 and PEC1  Laterality:  Bilateral  Injection Technique:  Single-shot    Needle    Needle Type:  Short-bevel and echogenic  Needle Gauge:  21 G  Needle Length:  50 mm  Needle Localization:  Ultrasound guidance  Reason for Ultrasound Use: appropriate spread of the medication was noted in real time and no ultrasound evidence of intravascular and/or intraneural injection            Assessment    Injection Assessment:  Good spread noted, negative resistance, negative aspiration for heme, incremental injection, low pressure and local visualized surrounding nerve on ultrasound  Heart Rate Change: No    - Patient tolerated block procedure well without evidence of immediate block related complications.      Medications      Additional Comments    0.25% ropivacaine 30cc's bilaterally with 2mg decadron on each side

## 2022-02-17 NOTE — BRIEF OP NOTE
Pre-Operative Diagnosis: Malignant neoplasm of overlapping sites of left breast in female, estrogen receptor positive ([facility] [C50.812, Z17.0]     Post-Operative Diagnosis: Malignant neoplasm of overlapping sites of left breast in female, estrogen receptor positive ([facility] [C50.812, Z17.0]      Procedure Performed:   Bilateral nipple versus skin sparing mastectomies, left sentinel lymph node biopsy, Johnney Teresita), Immediate bilateral breast reconstruction with tissue expanders, acellular dermal matrix (Ana)     Surgeon(s) and Role:  Panel 1:     Citlalli Wu MD - Primary  Panel 2:     * Carrie Rodríguez MD - Primary    Assistant(s):  Surgical Assistant.: Idalia Puga CSA  PA: Justin Vargas     Surgical Findings: L SLN x2 negative     Specimen: Bilateral breasts, L SLN x2     Estimated Blood Loss: Blood Output: 30 mL (2/17/2022  2:29 PM)    Lisa Beck MD  2/17/2022  2:43 PM

## 2022-02-17 NOTE — OPERATIVE REPORT
PREOPERATIVE DIAGNOSIS: Left breast cancer with acquired absence of bilateral breasts. POSTOPERATIVE DIAGNOSIS: Left breast cancer with acquired absence of bilateral breasts. PROCEDURE PERFORMED: Immediate bilateral breast reconstruction with tissue expander and acellular dermal matrix. ASSISTANT: GISSELLE Guerrier  ANESTHESIA: General with endotracheal intubation. ESTIMATED BLOOD LOSS: 10ml  DRAINS: Shilpi Adis x 3  SPECIMENS: None  COMPLICATIONS: None. FINDINGS: Bilateral breasts reconstructed with [place] Artoura smooth high profile tissue expander, 500 mL. On the right, reference SDC-135H,  serial [id number]. On the left, reference SDC-135MH, serial [id number]. The tissue expanders were placed in the prepectoral position with anterior coverage of Alloderm and filled intraoperatively with 200 mL of air. INDICATIONS: The patient is a 77-year-old female with a history of left breast cancer. The patient was evaluated by Dr. Mely Lincoln and elected to undergo bilateral nipple-sparing mastectomy. She was referred preoperatively to discuss reconstructive options and opted for immediate reconstruction with tissue expanders and acellular dermal matrix. PROCEDURE: Informed consent was obtained from the patient. The risks, benefits, and alternatives were reviewed with the patient preoperatively. She expressed understanding and wished to proceed. The patient was marked in the preoperative holding area in the upright position. The midline, inframammary fold, lateral fold of the breasts were marked. Inframammary fold incisions were marked in conjunction with Dr. Mely Lincoln. The patient was then taken to the operating room by Dr. Rigo Deleon team where she underwent bilateral nipple-sparing mastectomy. Once the mastectomies were completed, I entered the room and the plastic surgery portion of the procedure began. The surgical site was re-draped sterilely.   The mastectomy skin flaps were examined and appeared of suitable thickness of viability to facilitate immediate reconstruction. The pockets were irrigated with warm saline irrigation until all particulate fat was evacuated. Hemostasis was then secured with electrocautery. Next, the pockets were irrigated with Betadine irrigation and then with antibiotic irrigation until clear. Next gloves were changed and 4 sheets of large contour perforated AlloDerm were brought on the field and prepared in saline. Two sheets of AlloDerm were secured along the long axis the running 2-0 PDS suture. The tissue expanders were then brought on the field and immediately bathed in  antibiotic irrigation. They were checked for integrity and noted to be intact. The AlloDerm was draped over the anterior surface of the tissue expander. Fenestrations were then created to allow passage of the suture tabs which were secured to the  AlloDerm with interrupted 2-0 PDS sutures. A posterior pursestring suture of 2-0 PDS was then placed. An identical construct was created for both sides. The expanders were then accessed and all air was evacuated. Gloves were then changed and the surgical sites were isolated with [place] Mater. The tissue expander/AlloDerm construct were then delivered via the incisions and sutured to the chest wall with interrupted 2-0 PDS sutures. The AlloDerm along the inferior gutter was secured along the inframammary fold with interrupted 2-0 Vicryl sutures. Superiorly the AlloDerm was secured to the pectoralis muscle with interrupted 2-0 Vicryl sutures. Laterally, the AlloDerm was secured to the serratus fascia with interrupted 2-0 Vicryl sutures. The mastectomy margins were inspected and appeared well-perfused. Two 15-[place] Phuc drains on the left and one on the right were exited through a lateral stab incision and sutured to the skin with 3-0 nylon suture. The expander was then accessed with a butterfly needle and filled with 200mL of air.  This permitted tension-free closure of the skin edges. The skin was then closed with interrupted 3-0 Vicryl deep dermal suture and running 4-0 Monocryl subcuticular suture. The drain sites were dressed with BioPatch and Tegaderm. The incisions dressed with Exofin, steristrips, Fluff gauze, and a surgical bra. The patient was awakened, extubated, and taken to the recovery area in stable condition. There were no operative complications. All needle, sponge, and instrument counts were correct at the end of the procedure.

## 2022-02-17 NOTE — PROGRESS NOTES
Plan for bilateral NS-mastectomy by Dr. Tommie Velásquez and immediate reconstruction with tissue expander and acellular dermal matrix reviewed with patient. The risks of surgery including but not limited to bleeding, infection, scarring, delayed wound healing, seroma, asymmetry, implant infection/extrusion requiring removal, expander deflation, injury to adjacent structures, capsular contracture, ALCL, and need for further surgery were reviewed. The expected post-operative course was discussed. Questions were answered to the patient's satisfaction. No guarantees as to outcome were offered. The patient expresses understanding and wishes to proceed.

## 2022-02-18 NOTE — OPERATIVE REPORT
[facility]    PATIENT'S NAME: Naya Storm SAMANTHA   ATTENDING PHYSICIAN: Darren Ramírez. [facility], M.D. OPERATING PHYSICIAN: Darren Ramírez. [facility], M.D. PATIENT ACCOUNT#:   [de-identified]    LOCATION:  [place] [address]  MEDICAL RECORD #:   [id number]       YOB: 1990  ADMISSION DATE:       02/17/2022      OPERATION DATE:  02/17/2022    OPERATIVE REPORT    PREOPERATIVE DIAGNOSIS:  Left breast cancer. POSTOPERATIVE DIAGNOSIS:  Left breast cancer. PROCEDURE:  Bilateral nipple-sparing mastectomies with left breast injection of blue dye for sentinel lymph node identification and left sentinel lymph node biopsy. ASSISTANT:  Yen Gomes CSA. ANESTHESIA:  General anesthesia and bilateral pectoral nerve blocks placed per Anesthesia. ESTIMATED BLOOD LOSS:  30 mL for my portion of the procedure. DRAINS:  Placed per Plastic Surgery. COMPLICATIONS:  No immediate complications. DISPOSITION:  Patient remains in OR for reconstruction. INDICATIONS:  The patient is a 19-year-old female with a self-detected left breast cancer. She was found to have a suspicious 4 cm mass at the 6-o'clock position and underwent a neoadjuvant chemotherapy with [facility] for her T2N0 clinical cancer. She has done well. She has no clinical residual disease. She has tested BRCA genetically positive and is electing for bilateral mastectomies for maximal risk reduction. Risks and possible complications of a bilateral mastectomy with a left sentinel lymph node biopsy and possible left axillary lymph node dissection were discussed at length. Because of her desire for immediate reconstruction as well as nipple preservation, the unusual procedure of nipple-sparing mastectomy instead of the usual procedure of simple mastectomy was necessary. This required surgical assistance in order to allow for adequate exposure to complete this operation.   This also required additional time and effort in increased complexity in order to allow for adequate skin perfusion to accommodate immediate reconstruction. Risks and possible complications were discussed with the patient including but not limited to infection, bleeding, injury to surrounding structures, possible need for reoperation. She agreed to the proposed surgery. OPERATIVE TECHNIQUE:  Patient had undergone injection of radioisotope as well as lymphoscintigraphy for sentinel lymph node identification preoperatively in the nuclear medicine department to the left breast.  She was brought to the OR, placed in the supine position, properly padded and secured, given a dose of IV antibiotics, and sequential compression devices were applied to her legs for DVT prophylaxis. General anesthesia was induced. Bilateral pectoral nerve blocks were placed per Anesthesia. A diluted methylene blue dye was injected in the periareolar location to the left breast, massaged approximately 5 minutes. Bilateral breasts and arms were prepped and draped in the usual sterile fashion. A transverse incision was made with a 15-blade knife at the inferior aspect of the left axillary hairline. After confirming uptake with a handheld gamma counter, sharp dissection and electrocautery were used to dissect through the various layers of the axilla including dissection through the clavipectoral fascia into the deep axilla where her sentinel node was identified and harvested. A total of 2 nodes were removed as sentinel node and sent for frozen section, the results of which were negative for the presence of any metastatic disease, and therefore, no completion axillary lymph node dissection was performed. An inframammary incision was incised with a 15-blade knife for the skin. Electrocautery was used for hemostasis.   Using sharp dissection and electrocautery, mastectomy flaps were raised to the borders of the clavicle, sternum, inframammary fold, and latissimus tendon laterally, and the left breast was then dissected carefully off the underlying muscle with electrocautery including en bloc excision of the pectoralis fascia. It was oriented with a short stitch at the base of nipple and long stitch at the axillary tail and sent for permanent pathologic evaluation. Her wound was irrigated. Hemostasis assured with electrocautery and hemoclips, and a moist operating pad was left in place for the reconstruction. Attention was taken toward the right breast.  An inframammary incision was completed with a 15-blade knife in the skin. Electrocautery was used for hemostasis. Using sharp dissection and electrocautery, mastectomy flaps were raised to the borders of the clavicle, sternum, inframammary fold, and latissimus tendon laterally. The right breast was dissected off the underlying muscle with electrocautery including en bloc excision of the pectoralis fascia. It was oriented with a short stitch at the base of the nipple and long stitch at the axillary tail and sent for permanent pathologic evaluation. Wound was irrigated. Hemostasis assured with electrocautery and hemoclips, and a moist operating pad was left in place for the reconstruction. Axillary incision was closed with an interrupted 3-0 Vicryl for deep layer and a running 4-0 subcuticular Monocryl for the skin, and a sterile dressing was applied. Remainder of this procedure will be dictated separately by Dr. Sagrario Lovell. All counts were correct at the conclusion of my portion of the procedure. Estimated blood loss for my portion was approximately 30 mL. Patient was hemodynamically stable upon my exit from the OR. Dictated By Jose Alfredo Verde. Yasemin Greer M.D.  d: 02/17/2022 15:24:14  t: 02/17/2022 17:41:27  [facility] [id number]  [facility]    cc: AMINATA Benavidez M.D.

## 2022-02-21 NOTE — PROGRESS NOTES
[facility] Progress Note      Patient Name: Darien William   YOB: 1990  Medical Record Number: [id number]  Attending Physician: Waqas Ayers M.D. Date of Visit: 2/22/2022      Chief Complaint  Invasive ductal carcinoma, left breast - follow up and treatment. Oncologic History  Alonzo Sousa is a 32year old female who on 08/23/2021 underwent bilateral mammography and ultrasound for a palpable left breast mass which showed a 5 cm mass in the left breast at 6 o'clock, several enlarged/prominent left axillary lymph nodes at least one of which showed cortical thickening and several right breast cysts. On 09/02/2021 she underwent ultrasound guided biopsy of the left breast mass which showed a grade 3 invasive ductal carcinoma; immunohistochemical studies showed the following: estrogen receptor 50% positive (weak to moderate), progesterone receptor 50% positive (strong), Her2 3+ (positive), and Ki67 75%. CT chest, abdomen, pelvis w contrast on 09/14/2021 showed the left breast mass, a small 6 x 3 mm pulmonary nodule along the major fissure on the left likely representing an intramammary lymph node, a 7.7 cm pelvic mass abutting the posterior uterus, a cyst within the left ovary, and a 1.8 cm sclerotic lesions involving the proximal left femur. Pelvic ultrasound on 09/15/2021 showed a 5.2 x 7.1 x 7.3 cm pelvic mass likely representing a pedunculated uterine fibroid and 2.2 x 2.8 x 3.2 simple left ovarian cyst felt to be benign. NM bone scan on 09/15/2021 showed uptake in the proximal left femur. MRI of the pelvis w/wo contrast does show a corresponding abnormality. The MRI was reviewed with radiology at [facility] and the consensus was that the finding likely represented a benign lesion. MUGA scan on 09/16/2021 showed a normal LVEF of 62%.      MRI breasts on 09/16/2021 showed the following in the left breast: a 4.8 cm mass at the 6 o'clock position with suspicious enhancement involving the skin along the inferior breast but not the chest wall, a 0.9 cm enhancing mass with enhancement at the 12 o'clock position, a 1.1 cm enhancing mass at the 6 o'clock position, a 0.7 cm indeterminate mass at the 8 o'clock position, and multiple normal sized lymph nodes with borderline thickened cortex. The following was seen in the right breast: a 0.8 cm area of indeterminate enhancement at the 12 o'clock position, a second 0.6 cm area of benign enhancement, and normal appearing axillary lymph nodes. Genetic testing with [employer]'s My Risk panel showed that she is a carrier of the deleterious BRCA2 mutation [id number] ([id number]). She was staged clinically as T2N0M0. On 09/17/2021 she began neoadjuvant therapy with [facility].    Patient was premenopausal at diagnosis. She elected against egg harvesting for fertility preservation. On 09/16/2021 patient began therapy with TCHP at an outside institution. She received her last dose on 01/10/2022 and then continued trastuzumab and pertuzumab on 02/03/2022. On 02/17/2022 she underwent bilateral mastectomy, left sentinel lymph node biopsy, and tissue expander reconstruction. Pathology showed no residual malignancy in the left breast, no malignancy in the right breast, and 2 lymph nodes negative for malignancy (0/2). History of Present Illness  Patient returns for follow up and treatment. Patient feels well today and has no specific complaints except some discomfort related to surgery and fatigue. She denies fevers or chills. Performance Status   Karnofsky 100% - Normal, no complaints. Past Medical History (historical data, reviewed by physician)  Invasive ductal carcinoma, left breast (as above). Past Surgical History (historical data, reviewed by physician)  None.     Family History (historical data, reviewed by physician)  Maternal aunt with breast cancer age 25s; first cousin (daughter of the aunt with breast cancer) with ovarian cancer age 12; maternal grandmother with ?colorectal cancer. Mother living with no history of cancer. Father living with no history of cancer. Social History (historical data, reviewed by physician)  Denies tobacco use. Current Medications  HYDROcodone-acetaminophen 5-325 MG Oral Tab, Take 1-2 tablets by mouth every 4 (four) hours as needed for Pain., Disp: 40 tablet, Rfl: 0  docusate sodium 100 MG Oral Cap, Take 1 capsule (100 mg total) by mouth 2 (two) times daily. , Disp: 40 capsule, Rfl: 0  ondansetron (ZOFRAN) 4 mg tablet, Take 1 tablet (4 mg total) by mouth every 8 (eight) hours as needed for Nausea., Disp: 30 tablet, Rfl: 0  cephalexin 500 MG Oral Cap, Take 1 capsule (500 mg total) by mouth 4 (four) times daily for 10 days. Take and refill this antibiotic until all drains have been removed, Disp: 40 capsule, Rfl: 2  POTASSIUM CHLORIDE 20 MEQ Oral Tab CR, TAKE 1 TABLET(20 MEQ) BY MOUTH DAILY, Disp: 30 tablet, Rfl: 2  ALPRAZolam 0.25 MG Oral Tab, Take 0.25 mg by mouth nightly as needed. , Disp: , Rfl:   acetaminophen 500 MG Oral Tab, Take 1,000 mg by mouth every 6 (six) hours as needed for Pain., Disp: , Rfl:     Allergies   Ms. Madelaine Medina is allergic to bactrim [sulfamethoxazole w/trimethoprim]. Vital Signs   Height: 167.6 cm (5' 6\") (02/22 0959)  Weight: 65.5 kg (144 lb 6.4 oz) (02/22 0959)  BSA (Calculated - sq m): 1.74 sq meters (02/22 0959)  Pulse: --  BP: --  Temp: --  Do Not Use - Resp Rate: --  SpO2: --    Physical Examination   Constitutional      Well developed, well nourished. Appears close to chronological age. No apparent distress. Head   Normocephalic and atraumatic. Eyes   Conjunctiva clear; sclera anicteric. ENMT                 External nose normal; external ears normal.  Respiratory          Normal effort; no respiratory distress. Cardiovascular  Regular rate and rhythm. Abdomen  Not distended.    Neurologic           Motor and sensory grossly intact. Psychiatric          Mood and affect appropriate. Laboratory   No results found for this or any previous visit (from the past 48 hour(s)). Impression and Plan   1. Invasive ductal carcinoma, left breast: Patient is staged as T2N0M0. Patient's tumor was estrogen and progesterone receptor positive and Her2 positive. She started neoadjuvant TCHP chemotherapy at the outside institution on 09/16/2021. She completed 6 cycles of therapy and then continued trastuzumab and pertuzumab until proceeding with bilateral mastectomy and left sentinel lymph node biopsy on 02/17/2022. No residual malignancy was found. In light of the complete response, I recommend she continue adjuvant trasuzumab and pertuzumab to complete a total of 1 year of therapy. Patient's tumor was hormone receptor positive and adjuvant endocrine therapy for at least 5 years is recommended. We discussed both tamoxifen and ovarian suppression and aromatase inhibitor therapy. Together we settled on tamoxifen and will plan to begin soon. Patient's case was reviewed in conference today with radiation oncology. As patient's tumor was less than 5 cm at diagnosis, there was no clear skin involvement, no definitive evidence of lymph node involvement at diagnosis, and no residual malignancy after neoadjuvant therapy, adjuvant radiation therapy is not recommended at this time. The Edd study evaluating adjuvant olaparib showed benefit in triple negative breast cancer and hormone positive/Her2 negative disease with residual disease after neoadjuvant therapy. Olaparib is not recommended for this patient. 2.   Pelvic mass: Patient was seen by Dr. Bre Figueroa who feels that the likely etiology is uterine fibroid. Ultrasound on 01/06/2022 was consistent with this. 3.   BRCA2 mutation carrier:  We will address further her risk of ovarian cancer at a future appointment but imaging to date has not shown any concerning abnormality in the ovaries. Pelvic ultrasound will also screen. 4.   Left femur abnormality on MRI and bone scan: Consensus in our tumor board was the abnormality represented a benign entity. Will consider repeating MRI though it is unclear whether results will be definitive in a manner that would be helpful. Planned Follow Up   Patient will continue every 3 week therapy. Electronically signed by:    Isabella Solano M.D.   Associate Medical Director of Oncology [facility], [place], [place]

## 2022-02-22 ENCOUNTER — OFFICE VISIT (OUTPATIENT)
Dept: HEMATOLOGY/ONCOLOGY | Facility: HOSPITAL | Age: 32
End: 2022-02-22
Attending: SPECIALIST
Payer: COMMERCIAL

## 2022-02-22 ENCOUNTER — OFFICE VISIT (OUTPATIENT)
Dept: SURGERY | Facility: CLINIC | Age: 32
End: 2022-02-22
Payer: COMMERCIAL

## 2022-02-22 DIAGNOSIS — Z17.0 MALIGNANT NEOPLASM OF OVERLAPPING SITES OF LEFT BREAST IN FEMALE, ESTROGEN RECEPTOR POSITIVE (HCC): Primary | ICD-10-CM

## 2022-02-22 DIAGNOSIS — Z51.11 CHEMOTHERAPY MANAGEMENT, ENCOUNTER FOR: ICD-10-CM

## 2022-02-22 DIAGNOSIS — Z17.0 MALIGNANT NEOPLASM OF OVERLAPPING SITES OF LEFT BREAST IN FEMALE, ESTROGEN RECEPTOR POSITIVE (HCC): ICD-10-CM

## 2022-02-22 DIAGNOSIS — C50.812 MALIGNANT NEOPLASM OF OVERLAPPING SITES OF LEFT BREAST IN FEMALE, ESTROGEN RECEPTOR POSITIVE (HCC): Primary | ICD-10-CM

## 2022-02-22 DIAGNOSIS — Z15.09 BRCA2 POSITIVE: Primary | ICD-10-CM

## 2022-02-22 DIAGNOSIS — T45.1X5A ANTINEOPLASTIC CHEMOTHERAPY INDUCED ANEMIA: ICD-10-CM

## 2022-02-22 DIAGNOSIS — Z51.11 ENCOUNTER FOR CHEMOTHERAPY MANAGEMENT: ICD-10-CM

## 2022-02-22 DIAGNOSIS — Z15.01 BRCA2 POSITIVE: Primary | ICD-10-CM

## 2022-02-22 DIAGNOSIS — D64.81 ANTINEOPLASTIC CHEMOTHERAPY INDUCED ANEMIA: ICD-10-CM

## 2022-02-22 DIAGNOSIS — C50.812 MALIGNANT NEOPLASM OF OVERLAPPING SITES OF LEFT BREAST IN FEMALE, ESTROGEN RECEPTOR POSITIVE (HCC): ICD-10-CM

## 2022-02-22 PROCEDURE — 99024 POSTOP FOLLOW-UP VISIT: CPT | Performed by: SURGERY

## 2022-02-22 PROCEDURE — 99215 OFFICE O/P EST HI 40 MIN: CPT | Performed by: SPECIALIST

## 2022-02-22 PROCEDURE — 3008F BODY MASS INDEX DOCD: CPT | Performed by: SURGERY

## 2022-02-22 PROCEDURE — 96413 CHEMO IV INFUSION 1 HR: CPT

## 2022-02-22 PROCEDURE — 3078F DIAST BP <80 MM HG: CPT | Performed by: SURGERY

## 2022-02-22 PROCEDURE — 96417 CHEMO IV INFUS EACH ADDL SEQ: CPT

## 2022-02-22 PROCEDURE — 3074F SYST BP LT 130 MM HG: CPT | Performed by: SURGERY

## 2022-02-22 NOTE — PROGRESS NOTES
Pt here for C8D1 of Perjeta/Hetceptine.   Arrives Ambulating independently, accompanied by Self           Pregnancy screening: Denies possibility of pregnancy    Modifications in dose or schedule: No     Frequency of blood return and site check throughout administration: Prior to administration   Discharged to Home, Ambulating independently, accompanied by:Self    Outpatient Oncology Care Plan  Problem list:  knowledge deficit  Problems related to:    chemotherapy  Interventions:  chemotherapy teaching  Expected outcomes:  understands plan of care  Progress towards outcome:  making progress    Education Record    Learner:  Patient  Barriers / Limitations:  None  Method:  Brief focused  Outcome:  Shows understanding  Comments:

## 2022-02-22 NOTE — PROGRESS NOTES
Patient is here today for follow up with Khushboo Wu for Breast Cancer. Patient is due for C8 D1 Trastuzumab and Pertuzumab today. Patient surgical site pain. Medication list and medical history were reviewed and updated. Education Record    Learner:  Patient    Disease / Diagnosis: Breast Cancer    Barriers / Limitations:  None   Comments:    Method:  Brief focused, Discussion, Printed material and Reinforcement   Comments:    General Topics:  Medication, Pain, Procedure and Plan of care reviewed   Comments:    Outcome:  Shows understanding   Comments:  Post MD visit Patient sent to waiting room - treating RN notified. AVS provided and follow up reviewed. Patient instructed to call as needed.

## 2022-02-28 ENCOUNTER — OFFICE VISIT (OUTPATIENT)
Dept: SURGERY | Facility: CLINIC | Age: 32
End: 2022-02-28
Payer: COMMERCIAL

## 2022-02-28 VITALS
RESPIRATION RATE: 18 BRPM | WEIGHT: 144.38 LBS | HEIGHT: 66 IN | SYSTOLIC BLOOD PRESSURE: 107 MMHG | OXYGEN SATURATION: 97 % | BODY MASS INDEX: 23.2 KG/M2 | TEMPERATURE: 99 F | HEART RATE: 104 BPM | DIASTOLIC BLOOD PRESSURE: 73 MMHG

## 2022-02-28 DIAGNOSIS — Z90.13 ABSENCE OF BOTH BREASTS: Primary | ICD-10-CM

## 2022-02-28 PROBLEM — Z90.10 ABSENCE OF BREAST: Status: ACTIVE | Noted: 2022-02-28

## 2022-02-28 PROCEDURE — 99024 POSTOP FOLLOW-UP VISIT: CPT | Performed by: PHYSICIAN ASSISTANT

## 2022-02-28 NOTE — PROGRESS NOTES
This is a 40-year-old female that is 11 days status post her bilateral nipple sparing mastectomies with left sentinel lymph node biopsy by Dr. Dubois Shown with immediate reconstruction with bilateral tissue expanders, prepectoral position, AlloDerm ADM, Intra-Op air-filled to Dr. Abel Sher. She is here today for postop wound check and drain evaluation. Denies any fevers or signs of infection since surgery. Denies nausea vomiting. Denies chest pain, calf pain, shortness of breath. She is been compliant with compression, antibiotics, drain recording, activity restrictions. She is taking Norco sparingly for pain. Reports no acute events or setbacks since surgery. She continues Perjeta and Herceptin infusions. Exam:  Bilateral breast incisions clean dry and intact no evidence of infection cellulitis or drainage  No evidence of hematoma or seroma bilaterally  Bilateral mastectomy skin without erythema ecchymosis hyperemia skin breakdown or necrosis  Drain sites clean dry and intact drainage serosanguineous    Impression:  11 days status post her bilateral nipple sparing mastectomies with left sentinel lymph node biopsy by Dr. Dubois Shown with immediate reconstruction with bilateral tissue expanders, prepectoral position, AlloDerm ADM, Intra-Op air-filled to Dr. May Roth:  She will not require radiation after discussion in tumor board, she will continue hormone therapy with tamoxifen and continued Perjeta and Herceptin infusions. One drain from the left side was removed today due to low output. She tolerated this well. New drain site dressings were replaced bilaterally. She may return to the clinic when she meets drain removal parameters for the remaining 2 drains for 30 cc or less for 2 consecutive days. She will continue compression and antibiotics. I reviewed the air to saline exchange process with her and let her know that this will commence once drains are removed and incisions remain well-healed.   Overall she is doing well postoperatively without any complications.

## 2022-03-03 ENCOUNTER — NURSE ONLY (OUTPATIENT)
Dept: SURGERY | Facility: CLINIC | Age: 32
End: 2022-03-03
Payer: COMMERCIAL

## 2022-03-04 ENCOUNTER — OFFICE VISIT (OUTPATIENT)
Dept: SURGERY | Facility: CLINIC | Age: 32
End: 2022-03-04
Payer: COMMERCIAL

## 2022-03-04 DIAGNOSIS — Z90.13 ABSENCE OF BOTH BREASTS: Primary | ICD-10-CM

## 2022-03-04 PROCEDURE — 88305 TISSUE EXAM BY PATHOLOGIST: CPT | Performed by: SURGERY

## 2022-03-04 PROCEDURE — 99024 POSTOP FOLLOW-UP VISIT: CPT | Performed by: SURGERY

## 2022-03-04 NOTE — PROCEDURES
Debridement of right mastectomy skin flap necrosis. The area of necrosis encompassed in an ellipse. Was infiltrated with approximately 3 cc 1% lidocaine epinephrine. The area was prepped and draped sterilely. The ellipse was excised with a scalpel and sent for permanent folic analysis. The wound was repaired in layered fashion with a depth of 0 Vicryl deep dermal sutures and running 5-0 Prolene simple suture. Bacitracin, Xeroform, and gauze dressing were applied. The patient tolerated procedure well. There are no complications.

## 2022-03-04 NOTE — PROGRESS NOTES
Jennifer Martinez is a 28year old female who presents today for a follow-up. She denies fever and chills. She denies nausea, vomiting, diarrhea or constipation. Physical Examination:  Breasts: Right breast incision is noted to have a full-thickness eschar noted at the lower aspect of the central mastectomy flap measuring proxy 5 mm in greatest diameter and approximately 2.5 cm in width. The left breast is noted to have resolving epidermolysis in the underlying dermis is and of indeterminate viability at this time. Assessment and Plan:  Right mastectomy skin flap necrosis. I recommend debridement and reclosure under local anesthesia. The nature procedure was reviewed the patient. The risk, benefits, and alternatives reviewed. The patient expresses understanding wish to proceed. We discussed continue local wound care to the left breast while at [facility] and the patient return for follow-up in 1 week for reassessment. In the interim, she will continue topical antibiotic ointment and oral antibiotic prophylaxis. Ricky Ponce

## 2022-03-08 ENCOUNTER — TELEPHONE (OUTPATIENT)
Dept: SURGERY | Facility: CLINIC | Age: 32
End: 2022-03-08

## 2022-03-08 ENCOUNTER — OFFICE VISIT (OUTPATIENT)
Dept: SURGERY | Facility: CLINIC | Age: 32
End: 2022-03-08
Payer: COMMERCIAL

## 2022-03-08 DIAGNOSIS — Z90.13 ABSENCE OF BOTH BREASTS: ICD-10-CM

## 2022-03-08 DIAGNOSIS — Z90.13 ABSENCE OF BREAST, BILATERAL: Primary | ICD-10-CM

## 2022-03-08 PROCEDURE — 99024 POSTOP FOLLOW-UP VISIT: CPT | Performed by: SURGERY

## 2022-03-08 RX ORDER — HYDROCODONE BITARTRATE AND ACETAMINOPHEN 5; 325 MG/1; MG/1
1-2 TABLET ORAL EVERY 6 HOURS PRN
Qty: 12 TABLET | Refills: 0 | Status: SHIPPED | OUTPATIENT
Start: 2022-03-08

## 2022-03-08 NOTE — PROGRESS NOTES
Corina Villalta is a 28year old female who presents today for a follow-up. She denies fever and chills. She denies nausea, vomiting, diarrhea or constipation. Physical Examination:  Breasts: The right breast incision is clean dry and intact. The area of epidermolysis at the lower pole left breast appears improved and the underlying dermis appears intact. Assessment and Plan:  Patient is doing well. Her remaining drains were removed today. She is given a refill for the medication. She may slowly increase activity compression place. She was instructed to continue topical biotic ointment to her incisions. She will follow-up in 1 week for reassessment.

## 2022-03-15 ENCOUNTER — OFFICE VISIT (OUTPATIENT)
Dept: SURGERY | Facility: CLINIC | Age: 32
End: 2022-03-15
Payer: COMMERCIAL

## 2022-03-15 ENCOUNTER — TELEPHONE (OUTPATIENT)
Dept: SURGERY | Facility: CLINIC | Age: 32
End: 2022-03-15

## 2022-03-15 DIAGNOSIS — Z90.13 ABSENCE OF BOTH BREASTS: Primary | ICD-10-CM

## 2022-03-15 PROCEDURE — 99024 POSTOP FOLLOW-UP VISIT: CPT | Performed by: SURGERY

## 2022-03-15 NOTE — PROGRESS NOTES
Dino Gonzalez is a 28year old female who presents today for a follow-up. She denies fever and chills. She denies nausea, vomiting, diarrhea or constipation. Physical Examination:  Breasts: Bilateral breast incisions are well-healed. The area of epidermal lysis of the left breast is completely healed with mild hypopigmentation. Procedure: Bilateral breast were sterilely accessed and all air was aspirated. Each expander was filled with 150 cc of saline. 18 cc of seroma fluid were aspirated from the left breast.  No seroma fluid was aspirated from the right breast.    Assessment and Plan:  Patient is doing well. She may slowly increase activity with compression in place. She will follow-up in 1 week for further expansion.

## 2022-03-16 ENCOUNTER — OFFICE VISIT (OUTPATIENT)
Dept: HEMATOLOGY/ONCOLOGY | Age: 32
End: 2022-03-16
Attending: SPECIALIST
Payer: COMMERCIAL

## 2022-03-16 VITALS
BODY MASS INDEX: 23.25 KG/M2 | HEART RATE: 76 BPM | HEIGHT: 65.98 IN | RESPIRATION RATE: 16 BRPM | DIASTOLIC BLOOD PRESSURE: 74 MMHG | SYSTOLIC BLOOD PRESSURE: 135 MMHG | WEIGHT: 144.69 LBS | OXYGEN SATURATION: 100 % | TEMPERATURE: 99 F

## 2022-03-16 DIAGNOSIS — C50.812 MALIGNANT NEOPLASM OF OVERLAPPING SITES OF LEFT BREAST IN FEMALE, ESTROGEN RECEPTOR POSITIVE (HCC): Primary | ICD-10-CM

## 2022-03-16 DIAGNOSIS — Z17.0 MALIGNANT NEOPLASM OF OVERLAPPING SITES OF LEFT BREAST IN FEMALE, ESTROGEN RECEPTOR POSITIVE (HCC): Primary | ICD-10-CM

## 2022-03-16 PROCEDURE — 96417 CHEMO IV INFUS EACH ADDL SEQ: CPT

## 2022-03-16 PROCEDURE — 96413 CHEMO IV INFUSION 1 HR: CPT

## 2022-03-16 NOTE — PROGRESS NOTES
Pt here for C9D1. Arrives Ambulating independently, accompanied by Self           Pregnancy screening: Denies possibility of pregnancy    Modifications in dose or schedule: No     Frequency of blood return and site check throughout administration: Prior to administration, Prior to each drug and At completion of therapy   Discharged to Home, Ambulating independently, accompanied by:Self    Outpatient Oncology Care Plan  Problem list:  knowledge deficit  Problems related to:    chemotherapy  Interventions:  chemotherapy teaching  Expected outcomes:  understands plan of care  Progress towards outcome:  making progress    Education Record    Learner:  Patient  Barriers / Limitations:  None  Method:  Brief focused  Outcome:  Shows understanding  Comments:  No complication.

## 2022-03-22 ENCOUNTER — OFFICE VISIT (OUTPATIENT)
Dept: SURGERY | Facility: CLINIC | Age: 32
End: 2022-03-22
Payer: COMMERCIAL

## 2022-03-22 DIAGNOSIS — Z90.13 ABSENCE OF BOTH BREASTS: Primary | ICD-10-CM

## 2022-03-22 PROCEDURE — 99024 POSTOP FOLLOW-UP VISIT: CPT | Performed by: PHYSICIAN ASSISTANT

## 2022-03-28 ENCOUNTER — APPOINTMENT (OUTPATIENT)
Dept: SURGERY | Facility: CLINIC | Age: 32
End: 2022-03-28
Payer: COMMERCIAL

## 2022-03-28 ENCOUNTER — OFFICE VISIT (OUTPATIENT)
Dept: SURGERY | Facility: CLINIC | Age: 32
End: 2022-03-28

## 2022-03-28 DIAGNOSIS — Z90.13 ABSENCE OF BOTH BREASTS: Primary | ICD-10-CM

## 2022-03-28 PROCEDURE — 99024 POSTOP FOLLOW-UP VISIT: CPT | Performed by: PHYSICIAN ASSISTANT

## 2022-04-04 ENCOUNTER — OFFICE VISIT (OUTPATIENT)
Dept: SURGERY | Facility: CLINIC | Age: 32
End: 2022-04-04
Payer: COMMERCIAL

## 2022-04-04 DIAGNOSIS — Z90.13 ABSENCE OF BOTH BREASTS: Primary | ICD-10-CM

## 2022-04-04 PROCEDURE — 99024 POSTOP FOLLOW-UP VISIT: CPT | Performed by: PHYSICIAN ASSISTANT

## 2022-04-06 ENCOUNTER — TELEPHONE (OUTPATIENT)
Dept: HEMATOLOGY/ONCOLOGY | Facility: HOSPITAL | Age: 32
End: 2022-04-06

## 2022-04-07 ENCOUNTER — OFFICE VISIT (OUTPATIENT)
Dept: HEMATOLOGY/ONCOLOGY | Age: 32
End: 2022-04-07
Attending: SPECIALIST
Payer: COMMERCIAL

## 2022-04-07 VITALS
OXYGEN SATURATION: 97 % | TEMPERATURE: 99 F | SYSTOLIC BLOOD PRESSURE: 118 MMHG | DIASTOLIC BLOOD PRESSURE: 75 MMHG | HEART RATE: 80 BPM | RESPIRATION RATE: 18 BRPM

## 2022-04-07 DIAGNOSIS — C50.812 MALIGNANT NEOPLASM OF OVERLAPPING SITES OF LEFT BREAST IN FEMALE, ESTROGEN RECEPTOR POSITIVE (HCC): Primary | ICD-10-CM

## 2022-04-07 DIAGNOSIS — Z17.0 MALIGNANT NEOPLASM OF OVERLAPPING SITES OF LEFT BREAST IN FEMALE, ESTROGEN RECEPTOR POSITIVE (HCC): Primary | ICD-10-CM

## 2022-04-07 PROCEDURE — 96413 CHEMO IV INFUSION 1 HR: CPT

## 2022-04-07 PROCEDURE — 96417 CHEMO IV INFUS EACH ADDL SEQ: CPT

## 2022-04-07 NOTE — PROGRESS NOTES
Pt here for C10D1. Arrives Ambulating independently, accompanied by Self          Modifications in dose or schedule: No     Frequency of blood return and site check throughout administration: Prior to administration   Discharged to Home, Ambulating independently, accompanied by:Self    Outpatient Oncology Care Plan  Problem list:  knowledge deficit  Problems related to:    chemotherapy  Interventions:  provided general teaching  Expected outcomes:  understands plan of care  Progress towards outcome:  making progress    Education Record    Learner:  Patient  Barriers / Limitations:  None  Method:  Brief focused  Outcome:  Shows understanding  Comments:    Feeling a little achy in joints, otherwise no other complaints.  Clarified when she is due for labs/MD.

## 2022-04-12 ENCOUNTER — HOSPITAL ENCOUNTER (OUTPATIENT)
Dept: CV DIAGNOSTICS | Age: 32
Discharge: HOME | End: 2022-04-12
Attending: SPECIALIST
Payer: COMMERCIAL

## 2022-04-12 DIAGNOSIS — Z17.0 MALIGNANT NEOPLASM OF OVERLAPPING SITES OF LEFT BREAST IN FEMALE, ESTROGEN RECEPTOR POSITIVE (HCC): ICD-10-CM

## 2022-04-12 DIAGNOSIS — C50.812 MALIGNANT NEOPLASM OF OVERLAPPING SITES OF LEFT BREAST IN FEMALE, ESTROGEN RECEPTOR POSITIVE (HCC): ICD-10-CM

## 2022-04-12 PROCEDURE — 93306 TTE W/DOPPLER COMPLETE: CPT | Performed by: SPECIALIST

## 2022-04-13 ENCOUNTER — OFFICE VISIT (OUTPATIENT)
Dept: HEMATOLOGY/ONCOLOGY | Age: 32
End: 2022-04-13
Attending: SPECIALIST
Payer: COMMERCIAL

## 2022-04-13 ENCOUNTER — APPOINTMENT (OUTPATIENT)
Dept: HEMATOLOGY/ONCOLOGY | Age: 32
End: 2022-04-13
Attending: SPECIALIST
Payer: COMMERCIAL

## 2022-04-19 ENCOUNTER — OFFICE VISIT (OUTPATIENT)
Dept: SURGERY | Facility: CLINIC | Age: 32
End: 2022-04-19
Payer: COMMERCIAL

## 2022-04-19 DIAGNOSIS — Z90.13 ABSENCE OF BOTH BREASTS: Primary | ICD-10-CM

## 2022-04-19 PROCEDURE — 99024 POSTOP FOLLOW-UP VISIT: CPT | Performed by: SURGERY

## 2022-04-19 NOTE — PROGRESS NOTES
Simona Jiménez is a 28year old female who presents today for a follow-up. She is satisfied with her current size of her expansion. She is interested in options for flap based reconstruction. She has a 13.5 cm base diameter tissue expander filled to 330 cc. Physical Examination:  Breasts: Bilateral breast incisions are clean dry intact. Abdomen:  A limited lower abdominal pannus is noted. Medial thigh donor sites donor site show limited lipodystrophy. Assessment and Plan: We reviewed options for autologous reconstruction. Given the patient's body habitus, I believe the flap reconstruction would give her a suboptimal result, particularly compared to her implants. The patient is considering her options and may seek a second opinion. She will follow-up when she has reached a decision. The plan was reviewed with the patient and questions were answered.

## 2022-04-27 ENCOUNTER — OFFICE VISIT (OUTPATIENT)
Dept: HEMATOLOGY/ONCOLOGY | Age: 32
End: 2022-04-27
Attending: SPECIALIST
Payer: COMMERCIAL

## 2022-04-27 VITALS
BODY MASS INDEX: 23.38 KG/M2 | TEMPERATURE: 98 F | HEART RATE: 82 BPM | DIASTOLIC BLOOD PRESSURE: 75 MMHG | RESPIRATION RATE: 18 BRPM | HEIGHT: 65.98 IN | OXYGEN SATURATION: 97 % | WEIGHT: 145.5 LBS | SYSTOLIC BLOOD PRESSURE: 124 MMHG

## 2022-04-27 DIAGNOSIS — Q74.2 ABNORMALITY OF FEMUR: ICD-10-CM

## 2022-04-27 DIAGNOSIS — Z51.11 CHEMOTHERAPY MANAGEMENT, ENCOUNTER FOR: ICD-10-CM

## 2022-04-27 DIAGNOSIS — C50.812 MALIGNANT NEOPLASM OF OVERLAPPING SITES OF LEFT BREAST IN FEMALE, ESTROGEN RECEPTOR POSITIVE (HCC): Primary | ICD-10-CM

## 2022-04-27 DIAGNOSIS — Z17.0 MALIGNANT NEOPLASM OF OVERLAPPING SITES OF LEFT BREAST IN FEMALE, ESTROGEN RECEPTOR POSITIVE (HCC): Primary | ICD-10-CM

## 2022-04-27 DIAGNOSIS — Z15.01 BRCA2 GENE MUTATION POSITIVE IN FEMALE: ICD-10-CM

## 2022-04-27 DIAGNOSIS — T45.1X5A ANTINEOPLASTIC CHEMOTHERAPY INDUCED ANEMIA: ICD-10-CM

## 2022-04-27 DIAGNOSIS — Z51.81 ENCOUNTER FOR MONITORING CARDIOTOXIC DRUG THERAPY: ICD-10-CM

## 2022-04-27 DIAGNOSIS — Z15.09 BRCA2 GENE MUTATION POSITIVE IN FEMALE: ICD-10-CM

## 2022-04-27 DIAGNOSIS — Z15.09 BRCA2 POSITIVE: ICD-10-CM

## 2022-04-27 DIAGNOSIS — Z15.01 BRCA2 POSITIVE: ICD-10-CM

## 2022-04-27 DIAGNOSIS — Z51.11 ENCOUNTER FOR CHEMOTHERAPY MANAGEMENT: ICD-10-CM

## 2022-04-27 DIAGNOSIS — Z79.899 ENCOUNTER FOR MONITORING CARDIOTOXIC DRUG THERAPY: ICD-10-CM

## 2022-04-27 DIAGNOSIS — D64.81 ANTINEOPLASTIC CHEMOTHERAPY INDUCED ANEMIA: ICD-10-CM

## 2022-04-27 DIAGNOSIS — Z15.02 BRCA2 GENE MUTATION POSITIVE IN FEMALE: ICD-10-CM

## 2022-04-27 DIAGNOSIS — Z91.89 HIGH RISK OF OVARIAN CANCER: ICD-10-CM

## 2022-04-27 LAB
ALBUMIN SERPL-MCNC: 3.9 G/DL (ref 3.4–5)
ALBUMIN/GLOB SERPL: 1.1 {RATIO} (ref 1–2)
ALP LIVER SERPL-CCNC: 77 U/L
ALT SERPL-CCNC: 19 U/L
ANION GAP SERPL CALC-SCNC: 6 MMOL/L (ref 0–18)
AST SERPL-CCNC: 13 U/L (ref 15–37)
BASOPHILS # BLD AUTO: 0.01 X10(3) UL (ref 0–0.2)
BASOPHILS NFR BLD AUTO: 0.3 %
BILIRUB SERPL-MCNC: 0.8 MG/DL (ref 0.1–2)
BUN BLD-MCNC: 16 MG/DL (ref 7–18)
CALCIUM BLD-MCNC: 9.1 MG/DL (ref 8.5–10.1)
CHLORIDE SERPL-SCNC: 106 MMOL/L (ref 98–112)
CO2 SERPL-SCNC: 27 MMOL/L (ref 21–32)
CREAT BLD-MCNC: 0.81 MG/DL
EOSINOPHIL # BLD AUTO: 0.04 X10(3) UL (ref 0–0.7)
EOSINOPHIL NFR BLD AUTO: 1.3 %
ERYTHROCYTE [DISTWIDTH] IN BLOOD BY AUTOMATED COUNT: 11.2 %
GLOBULIN PLAS-MCNC: 3.7 G/DL (ref 2.8–4.4)
GLUCOSE BLD-MCNC: 114 MG/DL (ref 70–99)
HCT VFR BLD AUTO: 34.6 %
HGB BLD-MCNC: 11.6 G/DL
IMM GRANULOCYTES # BLD AUTO: 0.01 X10(3) UL (ref 0–1)
IMM GRANULOCYTES NFR BLD: 0.3 %
LYMPHOCYTES # BLD AUTO: 1.66 X10(3) UL (ref 1–4)
LYMPHOCYTES NFR BLD AUTO: 52.2 %
MCH RBC QN AUTO: 31 PG (ref 26–34)
MCHC RBC AUTO-ENTMCNC: 33.5 G/DL (ref 31–37)
MCV RBC AUTO: 92.5 FL
MONOCYTES # BLD AUTO: 0.21 X10(3) UL (ref 0.1–1)
MONOCYTES NFR BLD AUTO: 6.6 %
NEUTROPHILS # BLD AUTO: 1.25 X10 (3) UL (ref 1.5–7.7)
NEUTROPHILS # BLD AUTO: 1.25 X10(3) UL (ref 1.5–7.7)
NEUTROPHILS NFR BLD AUTO: 39.3 %
OSMOLALITY SERPL CALC.SUM OF ELEC: 290 MOSM/KG (ref 275–295)
PLATELET # BLD AUTO: 175 10(3)UL (ref 150–450)
POTASSIUM SERPL-SCNC: 3.4 MMOL/L (ref 3.5–5.1)
PROT SERPL-MCNC: 7.6 G/DL (ref 6.4–8.2)
RBC # BLD AUTO: 3.74 X10(6)UL
SODIUM SERPL-SCNC: 139 MMOL/L (ref 136–145)
WBC # BLD AUTO: 3.2 X10(3) UL (ref 4–11)

## 2022-04-27 PROCEDURE — 96413 CHEMO IV INFUSION 1 HR: CPT

## 2022-04-27 PROCEDURE — 99215 OFFICE O/P EST HI 40 MIN: CPT | Performed by: SPECIALIST

## 2022-04-27 PROCEDURE — 85025 COMPLETE CBC W/AUTO DIFF WBC: CPT

## 2022-04-27 PROCEDURE — 80053 COMPREHEN METABOLIC PANEL: CPT

## 2022-04-27 PROCEDURE — 96417 CHEMO IV INFUS EACH ADDL SEQ: CPT

## 2022-04-27 RX ORDER — TAMOXIFEN CITRATE 20 MG/1
20 TABLET ORAL DAILY
Qty: 30 TABLET | Refills: 3 | Status: SHIPPED | OUTPATIENT
Start: 2022-04-27

## 2022-04-27 NOTE — PROGRESS NOTES
Pt here for C11D1. Arrives Ambulating independently, accompanied by Self           Pregnancy screening: Not applicable    Modifications in dose or schedule: No     Frequency of blood return and site check throughout administration: Prior to administration and At completion of therapy   Discharged to Home, Ambulating independently, accompanied by:Self    Outpatient Oncology Care Plan  Problem list:  constipation  knowledge deficit  peripheral neuropathy  Problems related to:    chemotherapy  Interventions:  provided general teaching  Expected outcomes:  understands plan of care  Progress towards outcome:  making progress    Education Record    Learner:  Patient  Barriers / Limitations:  None  Method:  Discussion and Reinforcement  Outcome:  Shows understanding  Comments: Pt did report what she feels as worsening numbness in fingertips. Communicated this with MD. Pt is still able to complete ADLs without difficulty. Pt reports occasional constipation. Pt tolerated infusion. Pt left in stable condition.

## 2022-04-27 NOTE — PROGRESS NOTES
Patient is here today for followup with Eveline Bolaños for Breast Cancer. C11 D1 Trastuzumab and Pertuzumab. Patient stated pain in knee joints. Denies nausea appetite is good. Stated neuropathy numbness in finger tips and great toes has increase. Can still preform all ADL's. Constipated at times. Stated uses laxatives PRN. Medication list, medical history and toxicities were reviewed and updated. Education Record    Learner:  Patient    Disease / Diagnosis: Breast Cancer    Barriers / Limitations:  None   Comments:    Method:  Brief focused, Discussion, Printed material and Reinforcement   Comments:    General Topics:  Medication, Pain, Procedure and Plan of care reviewed   Comments:    Outcome:  Shows understanding   Comments:  Post MD visit patient sent to waiting room. Treating RN notified. AVS provided and follow up reviewed. Patient instructed to call as needed.

## 2022-04-28 ENCOUNTER — OFFICE VISIT (OUTPATIENT)
Dept: OBGYN CLINIC | Facility: CLINIC | Age: 32
End: 2022-04-28
Payer: COMMERCIAL

## 2022-04-28 VITALS
BODY MASS INDEX: 23.81 KG/M2 | HEART RATE: 82 BPM | SYSTOLIC BLOOD PRESSURE: 118 MMHG | DIASTOLIC BLOOD PRESSURE: 82 MMHG | HEIGHT: 65.98 IN | WEIGHT: 148.13 LBS

## 2022-04-28 DIAGNOSIS — Z15.01 BRCA2 GENE MUTATION POSITIVE: ICD-10-CM

## 2022-04-28 DIAGNOSIS — Z15.09 BRCA2 GENE MUTATION POSITIVE: ICD-10-CM

## 2022-04-28 DIAGNOSIS — Z12.4 SCREENING FOR CERVICAL CANCER: ICD-10-CM

## 2022-04-28 DIAGNOSIS — Z01.419 ENCOUNTER FOR WELL WOMAN EXAM WITH ROUTINE GYNECOLOGICAL EXAM: Primary | ICD-10-CM

## 2022-04-28 PROCEDURE — 3008F BODY MASS INDEX DOCD: CPT | Performed by: OBSTETRICS & GYNECOLOGY

## 2022-04-28 PROCEDURE — 87624 HPV HI-RISK TYP POOLED RSLT: CPT | Performed by: OBSTETRICS & GYNECOLOGY

## 2022-04-28 PROCEDURE — 99385 PREV VISIT NEW AGE 18-39: CPT | Performed by: OBSTETRICS & GYNECOLOGY

## 2022-04-28 PROCEDURE — 3079F DIAST BP 80-89 MM HG: CPT | Performed by: OBSTETRICS & GYNECOLOGY

## 2022-04-28 PROCEDURE — 3074F SYST BP LT 130 MM HG: CPT | Performed by: OBSTETRICS & GYNECOLOGY

## 2022-04-29 LAB — HPV I/H RISK 1 DNA SPEC QL NAA+PROBE: NEGATIVE

## 2022-05-03 ENCOUNTER — OFFICE VISIT (OUTPATIENT)
Dept: FAMILY MEDICINE CLINIC | Facility: CLINIC | Age: 32
End: 2022-05-03
Payer: COMMERCIAL

## 2022-05-03 VITALS
DIASTOLIC BLOOD PRESSURE: 72 MMHG | HEART RATE: 74 BPM | SYSTOLIC BLOOD PRESSURE: 112 MMHG | WEIGHT: 147 LBS | TEMPERATURE: 98 F | BODY MASS INDEX: 23.63 KG/M2 | HEIGHT: 66 IN | OXYGEN SATURATION: 99 % | RESPIRATION RATE: 16 BRPM

## 2022-05-03 DIAGNOSIS — H72.92 ACUTE OTITIS MEDIA WITH PERFORATION, LEFT: Primary | ICD-10-CM

## 2022-05-03 DIAGNOSIS — H66.92 ACUTE OTITIS MEDIA WITH PERFORATION, LEFT: Primary | ICD-10-CM

## 2022-05-03 PROCEDURE — 3074F SYST BP LT 130 MM HG: CPT | Performed by: NURSE PRACTITIONER

## 2022-05-03 PROCEDURE — 3008F BODY MASS INDEX DOCD: CPT | Performed by: NURSE PRACTITIONER

## 2022-05-03 PROCEDURE — 3078F DIAST BP <80 MM HG: CPT | Performed by: NURSE PRACTITIONER

## 2022-05-03 PROCEDURE — 99213 OFFICE O/P EST LOW 20 MIN: CPT | Performed by: NURSE PRACTITIONER

## 2022-05-03 RX ORDER — AMOXICILLIN 875 MG/1
875 TABLET, COATED ORAL 2 TIMES DAILY
Qty: 20 TABLET | Refills: 0 | Status: SHIPPED | OUTPATIENT
Start: 2022-05-03 | End: 2022-05-13

## 2022-05-03 RX ORDER — OFLOXACIN 3 MG/ML
10 SOLUTION AURICULAR (OTIC) DAILY
Qty: 1 EACH | Refills: 0 | Status: SHIPPED | OUTPATIENT
Start: 2022-05-03 | End: 2022-05-13

## 2022-05-17 ENCOUNTER — HOSPITAL ENCOUNTER (OUTPATIENT)
Dept: CT IMAGING | Age: 32
Discharge: HOME | End: 2022-05-17
Attending: SPECIALIST
Payer: COMMERCIAL

## 2022-05-17 DIAGNOSIS — Q74.2 ABNORMALITY OF FEMUR: ICD-10-CM

## 2022-05-17 PROCEDURE — 73700 CT LOWER EXTREMITY W/O DYE: CPT | Performed by: SPECIALIST

## 2022-05-18 ENCOUNTER — OFFICE VISIT (OUTPATIENT)
Dept: HEMATOLOGY/ONCOLOGY | Age: 32
End: 2022-05-18
Attending: SPECIALIST
Payer: COMMERCIAL

## 2022-05-18 VITALS
TEMPERATURE: 99 F | OXYGEN SATURATION: 98 % | HEIGHT: 65.98 IN | HEART RATE: 63 BPM | DIASTOLIC BLOOD PRESSURE: 79 MMHG | SYSTOLIC BLOOD PRESSURE: 122 MMHG | BODY MASS INDEX: 23.74 KG/M2 | WEIGHT: 147.69 LBS

## 2022-05-18 DIAGNOSIS — Z15.01 BRCA2 GENE MUTATION POSITIVE IN FEMALE: ICD-10-CM

## 2022-05-18 DIAGNOSIS — C50.812 MALIGNANT NEOPLASM OF OVERLAPPING SITES OF LEFT BREAST IN FEMALE, ESTROGEN RECEPTOR POSITIVE (HCC): Primary | ICD-10-CM

## 2022-05-18 DIAGNOSIS — Q74.2 ABNORMALITY OF FEMUR: ICD-10-CM

## 2022-05-18 DIAGNOSIS — Z17.0 MALIGNANT NEOPLASM OF OVERLAPPING SITES OF LEFT BREAST IN FEMALE, ESTROGEN RECEPTOR POSITIVE (HCC): Primary | ICD-10-CM

## 2022-05-18 DIAGNOSIS — Z91.89 HIGH RISK OF OVARIAN CANCER: ICD-10-CM

## 2022-05-18 DIAGNOSIS — Z15.09 BRCA2 GENE MUTATION POSITIVE IN FEMALE: ICD-10-CM

## 2022-05-18 DIAGNOSIS — Z51.11 CHEMOTHERAPY MANAGEMENT, ENCOUNTER FOR: ICD-10-CM

## 2022-05-18 DIAGNOSIS — Z15.02 BRCA2 GENE MUTATION POSITIVE IN FEMALE: ICD-10-CM

## 2022-05-18 PROCEDURE — 99215 OFFICE O/P EST HI 40 MIN: CPT | Performed by: SPECIALIST

## 2022-05-18 PROCEDURE — 96413 CHEMO IV INFUSION 1 HR: CPT

## 2022-05-18 PROCEDURE — 96417 CHEMO IV INFUS EACH ADDL SEQ: CPT

## 2022-05-18 NOTE — PROGRESS NOTES
Pt here for C12D1. Arrives Ambulating independently, accompanied by Self           Pregnancy screening: Denies possibility of pregnancy    Modifications in dose or schedule: No     Frequency of blood return and site check throughout administration: Prior to administration, Prior to each drug and At completion of therapy   Discharged to Home, Ambulating independently, accompanied by:Self    Outpatient Oncology Care Plan  Problem list:  fatigue  knowledge deficit  Problems related to:    chemotherapy  side effect of treatment  Interventions:  encourage activity as tolerated  monitor effect of therapy  provided general teaching  Expected outcomes:  symptoms relieved/minimized  understands plan of care  Progress towards outcome:  making progress    Education Record    Learner:  Patient  Barriers / Limitations:  None  Method:  Brief focused  Outcome:  Shows understanding   Comments: Tolerated treatment. Vitals stable. No complaints at this time.

## 2022-06-08 ENCOUNTER — OFFICE VISIT (OUTPATIENT)
Dept: HEMATOLOGY/ONCOLOGY | Age: 32
End: 2022-06-08
Attending: SPECIALIST
Payer: COMMERCIAL

## 2022-06-08 VITALS
BODY MASS INDEX: 24.43 KG/M2 | HEIGHT: 65.98 IN | DIASTOLIC BLOOD PRESSURE: 82 MMHG | TEMPERATURE: 99 F | HEART RATE: 74 BPM | WEIGHT: 152 LBS | RESPIRATION RATE: 20 BRPM | OXYGEN SATURATION: 100 % | SYSTOLIC BLOOD PRESSURE: 125 MMHG

## 2022-06-08 DIAGNOSIS — Z17.0 MALIGNANT NEOPLASM OF OVERLAPPING SITES OF LEFT BREAST IN FEMALE, ESTROGEN RECEPTOR POSITIVE (HCC): Primary | ICD-10-CM

## 2022-06-08 DIAGNOSIS — C50.812 MALIGNANT NEOPLASM OF OVERLAPPING SITES OF LEFT BREAST IN FEMALE, ESTROGEN RECEPTOR POSITIVE (HCC): Primary | ICD-10-CM

## 2022-06-08 PROCEDURE — 96417 CHEMO IV INFUS EACH ADDL SEQ: CPT

## 2022-06-08 PROCEDURE — 96413 CHEMO IV INFUSION 1 HR: CPT

## 2022-06-08 NOTE — PROGRESS NOTES
Pt here for C13D1.   Arrives Ambulating independently, accompanied by Self           Pregnancy screening: Denies possibility of pregnancy    Modifications in dose or schedule: No    Drugs/infusions dual verified for appearance and physical integrity: yes     Frequency of blood return and site check throughout administration: Prior to administration and Prior to each drug   Discharged to Home, Ambulating independently, accompanied by:Self    Outpatient Oncology Care Plan  Problem list:  fatigue  Problems related to:  side effect of treatment  Interventions:  encourage activity as tolerated  Expected outcomes:  understands plan of care  Progress towards outcome:  making progress    Education Record    Learner:  Patient  Barriers / Limitations:  None  Method:  Reinforcement  Outcome:  Shows understanding  Comments:  No c/o

## 2022-06-14 RX ORDER — POTASSIUM CHLORIDE 20 MEQ/1
TABLET, EXTENDED RELEASE ORAL
Qty: 90 TABLET | Refills: 0 | OUTPATIENT
Start: 2022-06-14

## 2022-06-14 RX ORDER — POTASSIUM CHLORIDE 20 MEQ/1
TABLET, EXTENDED RELEASE ORAL
Qty: 30 TABLET | Refills: 0 | Status: SHIPPED | OUTPATIENT
Start: 2022-06-14

## 2022-06-22 ENCOUNTER — HOSPITAL ENCOUNTER (OUTPATIENT)
Dept: ULTRASOUND IMAGING | Age: 32
Discharge: HOME | End: 2022-06-22
Attending: SPECIALIST
Payer: COMMERCIAL

## 2022-06-22 DIAGNOSIS — Z91.89 HIGH RISK OF OVARIAN CANCER: ICD-10-CM

## 2022-06-22 DIAGNOSIS — Z15.09 BRCA2 GENE MUTATION POSITIVE IN FEMALE: ICD-10-CM

## 2022-06-22 DIAGNOSIS — Z15.02 BRCA2 GENE MUTATION POSITIVE IN FEMALE: ICD-10-CM

## 2022-06-22 DIAGNOSIS — Z15.01 BRCA2 GENE MUTATION POSITIVE IN FEMALE: ICD-10-CM

## 2022-06-22 PROCEDURE — 76856 US EXAM PELVIC COMPLETE: CPT | Performed by: SPECIALIST

## 2022-06-22 PROCEDURE — 76830 TRANSVAGINAL US NON-OB: CPT | Performed by: SPECIALIST

## 2022-06-29 ENCOUNTER — OFFICE VISIT (OUTPATIENT)
Dept: HEMATOLOGY/ONCOLOGY | Age: 32
End: 2022-06-29
Attending: SPECIALIST
Payer: COMMERCIAL

## 2022-06-29 VITALS
TEMPERATURE: 100 F | BODY MASS INDEX: 24 KG/M2 | OXYGEN SATURATION: 100 % | SYSTOLIC BLOOD PRESSURE: 118 MMHG | DIASTOLIC BLOOD PRESSURE: 78 MMHG | WEIGHT: 151 LBS | RESPIRATION RATE: 18 BRPM | HEART RATE: 82 BPM

## 2022-06-29 DIAGNOSIS — Z17.0 MALIGNANT NEOPLASM OF OVERLAPPING SITES OF LEFT BREAST IN FEMALE, ESTROGEN RECEPTOR POSITIVE (HCC): Primary | ICD-10-CM

## 2022-06-29 DIAGNOSIS — C50.812 MALIGNANT NEOPLASM OF OVERLAPPING SITES OF LEFT BREAST IN FEMALE, ESTROGEN RECEPTOR POSITIVE (HCC): Primary | ICD-10-CM

## 2022-06-29 PROCEDURE — 96413 CHEMO IV INFUSION 1 HR: CPT

## 2022-06-29 PROCEDURE — 96417 CHEMO IV INFUS EACH ADDL SEQ: CPT

## 2022-06-29 NOTE — PROGRESS NOTES
Pt here for C14D1.   Arrives Ambulating independently, accompanied by Self           Pregnancy screening: Denies possibility of pregnancy    Modifications in dose or schedule: No    Drugs/infusions dual verified for appearance and physical integrity: yes     Frequency of blood return and site check throughout administration: Prior to administration, Prior to each drug and At completion of therapy   Discharged to Home, Ambulating independently, accompanied by:Self    Outpatient Oncology Care Plan  Problem list:  fatigue  Problems related to:  chemotherapy  Interventions:  encourage activity as tolerated  Expected outcomes:  understands plan of care  Progress towards outcome:  making progress    Education Record    Learner:  Patient  Barriers / Limitations:  None  Method:  Reinforcement  Outcome:  Shows understanding  Comments:  No c/o

## 2022-07-05 ENCOUNTER — HOSPITAL ENCOUNTER (OUTPATIENT)
Dept: CV DIAGNOSTICS | Age: 32
Discharge: HOME | End: 2022-07-05
Attending: SPECIALIST
Payer: COMMERCIAL

## 2022-07-05 DIAGNOSIS — Z51.81 ENCOUNTER FOR MONITORING CARDIOTOXIC DRUG THERAPY: ICD-10-CM

## 2022-07-05 DIAGNOSIS — C50.812 MALIGNANT NEOPLASM OF OVERLAPPING SITES OF LEFT BREAST IN FEMALE, ESTROGEN RECEPTOR POSITIVE (HCC): ICD-10-CM

## 2022-07-05 DIAGNOSIS — Z79.899 ENCOUNTER FOR MONITORING CARDIOTOXIC DRUG THERAPY: ICD-10-CM

## 2022-07-05 DIAGNOSIS — Z17.0 MALIGNANT NEOPLASM OF OVERLAPPING SITES OF LEFT BREAST IN FEMALE, ESTROGEN RECEPTOR POSITIVE (HCC): ICD-10-CM

## 2022-07-05 PROCEDURE — 93307 TTE W/O DOPPLER COMPLETE: CPT | Performed by: SPECIALIST

## 2022-07-05 PROCEDURE — 93356 MYOCRD STRAIN IMG SPCKL TRCK: CPT | Performed by: SPECIALIST

## 2022-07-08 ENCOUNTER — TELEPHONE (OUTPATIENT)
Dept: HEMATOLOGY/ONCOLOGY | Facility: HOSPITAL | Age: 32
End: 2022-07-08

## 2022-07-08 NOTE — TELEPHONE ENCOUNTER
----- Message from Tushar WALDROP sent at 7/8/2022 10:52 AM CDT -----  Regarding: Imaging  Hi, Dr Miracle Wallis. I've developed pain in my left armpit & would like to get it checked out to make sure it's nothing serious.

## 2022-07-08 NOTE — TELEPHONE ENCOUNTER
Called Kaylan regarding symptoms. She began having pain in her left armpit approximately 3-4 days ago. This is mostly a dull ache but sometimes is more severe. She denies any swelling or redness but says that \"it doesn't look like her other armpit\". This is her surgical side but she has not had this since her surgery. She denies numbness. She denies any change in her range of motion. She has not called surgery regarding this pain. She is anxious that this could be a problem and does not want to wait until her 7/20 visit for an exam.  Dr. Markus Grimm informed and patient scheduled for an ACV on Monday.

## 2022-07-11 ENCOUNTER — OFFICE VISIT (OUTPATIENT)
Dept: HEMATOLOGY/ONCOLOGY | Age: 32
End: 2022-07-11
Attending: SPECIALIST
Payer: COMMERCIAL

## 2022-07-11 VITALS
DIASTOLIC BLOOD PRESSURE: 92 MMHG | WEIGHT: 152.5 LBS | SYSTOLIC BLOOD PRESSURE: 142 MMHG | HEIGHT: 65.98 IN | TEMPERATURE: 99 F | BODY MASS INDEX: 24.51 KG/M2 | OXYGEN SATURATION: 100 % | HEART RATE: 67 BPM

## 2022-07-11 DIAGNOSIS — Z15.02 BRCA2 GENE MUTATION POSITIVE IN FEMALE: ICD-10-CM

## 2022-07-11 DIAGNOSIS — Z17.0 MALIGNANT NEOPLASM OF OVERLAPPING SITES OF LEFT BREAST IN FEMALE, ESTROGEN RECEPTOR POSITIVE (HCC): Primary | ICD-10-CM

## 2022-07-11 DIAGNOSIS — M79.622 LEFT AXILLARY PAIN: ICD-10-CM

## 2022-07-11 DIAGNOSIS — Z15.09 BRCA2 GENE MUTATION POSITIVE IN FEMALE: ICD-10-CM

## 2022-07-11 DIAGNOSIS — R10.2 PELVIC CRAMPING: ICD-10-CM

## 2022-07-11 DIAGNOSIS — Z15.01 BRCA2 GENE MUTATION POSITIVE IN FEMALE: ICD-10-CM

## 2022-07-11 DIAGNOSIS — C50.812 MALIGNANT NEOPLASM OF OVERLAPPING SITES OF LEFT BREAST IN FEMALE, ESTROGEN RECEPTOR POSITIVE (HCC): Primary | ICD-10-CM

## 2022-07-11 PROCEDURE — 99214 OFFICE O/P EST MOD 30 MIN: CPT | Performed by: NURSE PRACTITIONER

## 2022-07-20 ENCOUNTER — OFFICE VISIT (OUTPATIENT)
Dept: HEMATOLOGY/ONCOLOGY | Age: 32
End: 2022-07-20
Attending: SPECIALIST
Payer: COMMERCIAL

## 2022-07-20 VITALS
TEMPERATURE: 99 F | HEART RATE: 73 BPM | RESPIRATION RATE: 18 BRPM | DIASTOLIC BLOOD PRESSURE: 78 MMHG | SYSTOLIC BLOOD PRESSURE: 143 MMHG | BODY MASS INDEX: 24 KG/M2 | WEIGHT: 149.5 LBS | OXYGEN SATURATION: 99 %

## 2022-07-20 DIAGNOSIS — Z17.0 MALIGNANT NEOPLASM OF OVERLAPPING SITES OF LEFT BREAST IN FEMALE, ESTROGEN RECEPTOR POSITIVE (HCC): Primary | ICD-10-CM

## 2022-07-20 DIAGNOSIS — Z15.09 BRCA2 POSITIVE: ICD-10-CM

## 2022-07-20 DIAGNOSIS — Z15.01 BREAST CANCER, BRCA2 POSITIVE, LEFT (HCC): ICD-10-CM

## 2022-07-20 DIAGNOSIS — Z91.89 HIGH RISK OF OVARIAN CANCER: Primary | ICD-10-CM

## 2022-07-20 DIAGNOSIS — C50.812 MALIGNANT NEOPLASM OF OVERLAPPING SITES OF LEFT BREAST IN FEMALE, ESTROGEN RECEPTOR POSITIVE (HCC): Primary | ICD-10-CM

## 2022-07-20 DIAGNOSIS — Z15.01 BRCA2 POSITIVE: ICD-10-CM

## 2022-07-20 DIAGNOSIS — C50.912 BREAST CANCER, BRCA2 POSITIVE, LEFT (HCC): ICD-10-CM

## 2022-07-20 DIAGNOSIS — M54.2 NECK PAIN ON RIGHT SIDE: ICD-10-CM

## 2022-07-20 LAB
ALBUMIN SERPL-MCNC: 4 G/DL (ref 3.4–5)
ALBUMIN/GLOB SERPL: 1.2 {RATIO} (ref 1–2)
ALP LIVER SERPL-CCNC: 63 U/L
ALT SERPL-CCNC: 16 U/L
ANION GAP SERPL CALC-SCNC: 6 MMOL/L (ref 0–18)
AST SERPL-CCNC: 11 U/L (ref 15–37)
BASOPHILS # BLD AUTO: 0.02 X10(3) UL (ref 0–0.2)
BASOPHILS NFR BLD AUTO: 0.6 %
BILIRUB SERPL-MCNC: 0.7 MG/DL (ref 0.1–2)
BUN BLD-MCNC: 10 MG/DL (ref 7–18)
CALCIUM BLD-MCNC: 9.2 MG/DL (ref 8.5–10.1)
CHLORIDE SERPL-SCNC: 107 MMOL/L (ref 98–112)
CO2 SERPL-SCNC: 25 MMOL/L (ref 21–32)
CREAT BLD-MCNC: 0.84 MG/DL
EOSINOPHIL # BLD AUTO: 0.02 X10(3) UL (ref 0–0.7)
EOSINOPHIL NFR BLD AUTO: 0.6 %
ERYTHROCYTE [DISTWIDTH] IN BLOOD BY AUTOMATED COUNT: 12.9 %
GLOBULIN PLAS-MCNC: 3.4 G/DL (ref 2.8–4.4)
GLUCOSE BLD-MCNC: 88 MG/DL (ref 70–99)
HCT VFR BLD AUTO: 35.5 %
HGB BLD-MCNC: 12 G/DL
IMM GRANULOCYTES # BLD AUTO: 0 X10(3) UL (ref 0–1)
IMM GRANULOCYTES NFR BLD: 0 %
LYMPHOCYTES # BLD AUTO: 1.6 X10(3) UL (ref 1–4)
LYMPHOCYTES NFR BLD AUTO: 44.4 %
MCH RBC QN AUTO: 29.8 PG (ref 26–34)
MCHC RBC AUTO-ENTMCNC: 33.8 G/DL (ref 31–37)
MCV RBC AUTO: 88.1 FL
MONOCYTES # BLD AUTO: 0.24 X10(3) UL (ref 0.1–1)
MONOCYTES NFR BLD AUTO: 6.7 %
NEUTROPHILS # BLD AUTO: 1.72 X10 (3) UL (ref 1.5–7.7)
NEUTROPHILS # BLD AUTO: 1.72 X10(3) UL (ref 1.5–7.7)
NEUTROPHILS NFR BLD AUTO: 47.7 %
OSMOLALITY SERPL CALC.SUM OF ELEC: 284 MOSM/KG (ref 275–295)
PLATELET # BLD AUTO: 198 10(3)UL (ref 150–450)
POTASSIUM SERPL-SCNC: 3.6 MMOL/L (ref 3.5–5.1)
PROT SERPL-MCNC: 7.4 G/DL (ref 6.4–8.2)
RBC # BLD AUTO: 4.03 X10(6)UL
SODIUM SERPL-SCNC: 138 MMOL/L (ref 136–145)
WBC # BLD AUTO: 3.6 X10(3) UL (ref 4–11)

## 2022-07-20 PROCEDURE — 96417 CHEMO IV INFUS EACH ADDL SEQ: CPT

## 2022-07-20 PROCEDURE — 80053 COMPREHEN METABOLIC PANEL: CPT

## 2022-07-20 PROCEDURE — 99215 OFFICE O/P EST HI 40 MIN: CPT | Performed by: SPECIALIST

## 2022-07-20 PROCEDURE — 96413 CHEMO IV INFUSION 1 HR: CPT

## 2022-07-20 PROCEDURE — 85025 COMPLETE CBC W/AUTO DIFF WBC: CPT

## 2022-07-20 NOTE — PROGRESS NOTES
Patient is here today for follow up with Adina Cotton for Malignant neoplasm of the breast C15D1 Trastuzumab and Pertuzumab. Patient stated neck pain is a 3 on a scale of 0-10 - no pain with swallowing. C/O rash on left knee - resolving now. Constipation - uses dulcolax as needed. Medication list, medical history and toxicities  were reviewed and updated. Education Record    Learner:  Patient     Disease / Diagnosis: Malignant neoplasm of the breast    Barriers / Limitations:  None   Comments:    Method:  Brief focused, Discussion, Printed material and Reinforcement   Comments:    General Topics:  Medication, Pain, Procedure and Plan of care reviewed   Comments:    Outcome:  Shows understanding   Comments:    Marianna Doe MD visit. Patient sent to waiting room Treating RN notified. AVS provided and follow up reviewed. Patient instructed to call as needed.

## 2022-08-02 ENCOUNTER — TELEPHONE (OUTPATIENT)
Dept: INTERNAL MEDICINE CLINIC | Facility: CLINIC | Age: 32
End: 2022-08-02
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
## 2022-08-02 NOTE — TELEPHONE ENCOUNTER
Future Appointments   Date Time Provider Umer Glass   8/3/2022  2:00 PM Jes Ford MD [facility] [address] [facility]

## 2022-08-03 ENCOUNTER — OFFICE VISIT (OUTPATIENT)
Dept: INTERNAL MEDICINE CLINIC | Facility: CLINIC | Age: 32
End: 2022-08-03
Payer: COMMERCIAL

## 2022-08-03 VITALS
SYSTOLIC BLOOD PRESSURE: 114 MMHG | BODY MASS INDEX: 23.12 KG/M2 | TEMPERATURE: 98 F | WEIGHT: 150.81 LBS | HEART RATE: 84 BPM | OXYGEN SATURATION: 99 % | RESPIRATION RATE: 16 BRPM | HEIGHT: 67.72 IN | DIASTOLIC BLOOD PRESSURE: 64 MMHG
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
## 2022-08-03 DIAGNOSIS — S16.1XXA CERVICAL MYOFASCIAL STRAIN, INITIAL ENCOUNTER: ICD-10-CM

## 2022-08-03 DIAGNOSIS — M54.2 NECK PAIN: ICD-10-CM

## 2022-08-03 DIAGNOSIS — S40.012A CONTUSION OF LEFT SHOULDER, INITIAL ENCOUNTER: ICD-10-CM

## 2022-08-03 DIAGNOSIS — S80.811A ABRASION OF RIGHT LOWER LEG, INITIAL ENCOUNTER: ICD-10-CM

## 2022-08-03 DIAGNOSIS — S80.11XA CONTUSION OF RIGHT LOWER LEG, INITIAL ENCOUNTER: ICD-10-CM

## 2022-08-03 DIAGNOSIS — S70.02XA CONTUSION OF LEFT HIP, INITIAL ENCOUNTER: ICD-10-CM

## 2022-08-03 PROCEDURE — 99214 OFFICE O/P EST MOD 30 MIN: CPT | Performed by: FAMILY MEDICINE

## 2022-08-03 PROCEDURE — 3078F DIAST BP <80 MM HG: CPT | Performed by: FAMILY MEDICINE

## 2022-08-03 PROCEDURE — 3008F BODY MASS INDEX DOCD: CPT | Performed by: FAMILY MEDICINE

## 2022-08-03 PROCEDURE — 3074F SYST BP LT 130 MM HG: CPT | Performed by: FAMILY MEDICINE

## 2022-08-03 RX ORDER — CYCLOBENZAPRINE HCL 10 MG
10 TABLET ORAL 3 TIMES DAILY PRN
Qty: 30 TABLET | Refills: 1 | Status: SHIPPED | OUTPATIENT
Start: 2022-08-03 | End: 2022-08-23

## 2022-08-03 RX ORDER — NAPROXEN 500 MG/1
500 TABLET ORAL 2 TIMES DAILY WITH MEALS
Qty: 30 TABLET | Refills: 0 | Status: SHIPPED | OUTPATIENT
Start: 2022-08-03

## 2022-08-09 ENCOUNTER — TELEPHONE (OUTPATIENT)
Dept: INTERNAL MEDICINE CLINIC | Facility: CLINIC | Age: 32
End: 2022-08-09
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
## 2022-08-09 NOTE — TELEPHONE ENCOUNTER
Called and spoke to pt. Pt said that her L shoulder and hip hurt with certain movements. Neck pain has improved. Cervical XR ordered. Pt said she never completed because her neck is feeling better. She is asking if there is any other imaging [facility] or [address] would recommend given the pain is in her L shoulder and L hip primarily.      [address], would you recommend any other imaging besides cervical XR?
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
## 2022-08-10 ENCOUNTER — OFFICE VISIT (OUTPATIENT)
Dept: HEMATOLOGY/ONCOLOGY | Age: 32
End: 2022-08-10
Attending: SPECIALIST
Payer: COMMERCIAL

## 2022-08-10 VITALS
SYSTOLIC BLOOD PRESSURE: 133 MMHG | DIASTOLIC BLOOD PRESSURE: 82 MMHG | RESPIRATION RATE: 16 BRPM | OXYGEN SATURATION: 100 % | HEART RATE: 62 BPM | TEMPERATURE: 100 F

## 2022-08-10 DIAGNOSIS — Z17.0 MALIGNANT NEOPLASM OF OVERLAPPING SITES OF LEFT BREAST IN FEMALE, ESTROGEN RECEPTOR POSITIVE (HCC): Primary | ICD-10-CM

## 2022-08-10 DIAGNOSIS — C50.812 MALIGNANT NEOPLASM OF OVERLAPPING SITES OF LEFT BREAST IN FEMALE, ESTROGEN RECEPTOR POSITIVE (HCC): Primary | ICD-10-CM

## 2022-08-10 PROCEDURE — 96417 CHEMO IV INFUS EACH ADDL SEQ: CPT

## 2022-08-10 PROCEDURE — 96413 CHEMO IV INFUSION 1 HR: CPT

## 2022-08-11 NOTE — TELEPHONE ENCOUNTER
Pt stated that she started having back spams last night that are pretty bad. Currently laying down and feeling it now. Taking medication as prescribed and tried heating pad but still hurting. She would like to speak with a nurse.

## 2022-08-12 ENCOUNTER — HOSPITAL ENCOUNTER (OUTPATIENT)
Dept: GENERAL RADIOLOGY | Age: 32
Discharge: HOME | End: 2022-08-12
Attending: FAMILY MEDICINE
Payer: COMMERCIAL

## 2022-08-12 DIAGNOSIS — M54.2 NECK PAIN: ICD-10-CM

## 2022-08-12 PROCEDURE — 72050 X-RAY EXAM NECK SPINE 4/5VWS: CPT | Performed by: FAMILY MEDICINE

## 2022-08-12 NOTE — TELEPHONE ENCOUNTER
Continue with current muscle relaxant. Can completed cervical XR. Follow-up in clinic next week if needed.

## 2022-08-12 NOTE — TELEPHONE ENCOUNTER
Spoke to pt. Advised to continue current muscle relaxant. Complete cervical XR. F/u in clinic next week if needed. Pt stated understanding and said that she just completed XR. We will contact her with results once received/reviewed.

## 2022-08-12 NOTE — TELEPHONE ENCOUNTER
Called and spoke with pt. Pt said that she is still having back spasms, but it is ok right now because she just took a muscle relaxer. Pain at its worst was 5-6/10. Currently 3/10. Pt said that she knows [address] ordered cervical XR, but wondering if any further imaging is needed. Informed her as discussed the other day,  does not recommend any other imaging at this time. Discussed with her at the time, she stated her neck pain was better, so cervical XR was not needed. Asked her if this is still the case. Pt said that when she woke up this morning, her neck was kind of stiff. Informed her cervical XR order is in. Can still have completed. Pt stated understanding and agreed to plan. She asked if there are any further recommendations for her back spasms. [address], just continue muscle relaxer/heat application? Further recs pending XR results?

## 2022-08-22 ENCOUNTER — OFFICE VISIT (OUTPATIENT)
Dept: INTERNAL MEDICINE CLINIC | Facility: CLINIC | Age: 32
End: 2022-08-22
Payer: COMMERCIAL

## 2022-08-22 VITALS
WEIGHT: 150 LBS | DIASTOLIC BLOOD PRESSURE: 78 MMHG | TEMPERATURE: 98 F | HEART RATE: 74 BPM | HEIGHT: 67.72 IN | SYSTOLIC BLOOD PRESSURE: 118 MMHG | BODY MASS INDEX: 23 KG/M2

## 2022-08-22 DIAGNOSIS — S40.012D CONTUSION OF LEFT SHOULDER, SUBSEQUENT ENCOUNTER: ICD-10-CM
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
## 2022-08-22 DIAGNOSIS — S80.11XD CONTUSION OF RIGHT LOWER LEG, SUBSEQUENT ENCOUNTER: ICD-10-CM

## 2022-08-22 DIAGNOSIS — S16.1XXD CERVICAL MYOFASCIAL STRAIN, SUBSEQUENT ENCOUNTER: ICD-10-CM

## 2022-08-22 RX ORDER — TAMOXIFEN CITRATE 20 MG/1
TABLET ORAL
Qty: 30 TABLET | Refills: 3 | Status: SHIPPED | OUTPATIENT
Start: 2022-08-22

## 2022-08-23 RX ORDER — TAMOXIFEN CITRATE 20 MG/1
20 TABLET ORAL DAILY
Qty: 30 TABLET | Refills: 3 | OUTPATIENT
Start: 2022-08-23

## 2022-08-31 ENCOUNTER — OFFICE VISIT (OUTPATIENT)
Dept: HEMATOLOGY/ONCOLOGY | Age: 32
End: 2022-08-31
Attending: SPECIALIST
Payer: COMMERCIAL

## 2022-08-31 VITALS
HEIGHT: 65.98 IN | HEART RATE: 70 BPM | BODY MASS INDEX: 24.67 KG/M2 | SYSTOLIC BLOOD PRESSURE: 153 MMHG | DIASTOLIC BLOOD PRESSURE: 84 MMHG | OXYGEN SATURATION: 100 % | WEIGHT: 153.5 LBS | RESPIRATION RATE: 16 BRPM | TEMPERATURE: 99 F

## 2022-08-31 DIAGNOSIS — C50.812 MALIGNANT NEOPLASM OF OVERLAPPING SITES OF LEFT BREAST IN FEMALE, ESTROGEN RECEPTOR POSITIVE (HCC): Primary | ICD-10-CM

## 2022-08-31 DIAGNOSIS — Z17.0 MALIGNANT NEOPLASM OF OVERLAPPING SITES OF LEFT BREAST IN FEMALE, ESTROGEN RECEPTOR POSITIVE (HCC): Primary | ICD-10-CM

## 2022-08-31 PROCEDURE — 96413 CHEMO IV INFUSION 1 HR: CPT

## 2022-08-31 PROCEDURE — 96417 CHEMO IV INFUS EACH ADDL SEQ: CPT
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
## 2022-09-08 ENCOUNTER — TELEPHONE (OUTPATIENT)
Dept: SURGERY | Facility: CLINIC | Age: 32
End: 2022-09-08

## 2022-09-08 ENCOUNTER — OFFICE VISIT (OUTPATIENT)
Dept: SURGERY | Facility: CLINIC | Age: 32
End: 2022-09-08
Payer: COMMERCIAL

## 2022-09-08 ENCOUNTER — NURSE ONLY (OUTPATIENT)
Dept: SURGERY | Facility: CLINIC | Age: 32
End: 2022-09-08
Payer: COMMERCIAL

## 2022-09-08 VITALS
DIASTOLIC BLOOD PRESSURE: 77 MMHG | WEIGHT: 150.38 LBS | OXYGEN SATURATION: 97 % | HEART RATE: 65 BPM | TEMPERATURE: 99 F | BODY MASS INDEX: 24 KG/M2 | RESPIRATION RATE: 16 BRPM | SYSTOLIC BLOOD PRESSURE: 112 MMHG

## 2022-09-08 DIAGNOSIS — M79.89 LEFT AXILLARY SWELLING: Primary | ICD-10-CM

## 2022-09-08 PROCEDURE — 99214 OFFICE O/P EST MOD 30 MIN: CPT

## 2022-09-08 PROCEDURE — 3078F DIAST BP <80 MM HG: CPT

## 2022-09-08 PROCEDURE — 3074F SYST BP LT 130 MM HG: CPT
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
## 2022-09-12 ENCOUNTER — HOSPITAL ENCOUNTER (OUTPATIENT)
Dept: ULTRASOUND IMAGING | Facility: HOSPITAL | Age: 32
Discharge: HOME | End: 2022-09-12
Payer: COMMERCIAL

## 2022-09-12 DIAGNOSIS — M79.89 LEFT AXILLARY SWELLING: ICD-10-CM

## 2022-09-12 PROCEDURE — 76882 US LMTD JT/FCL EVL NVASC XTR: CPT

## 2022-09-14 ENCOUNTER — TELEPHONE (OUTPATIENT)
Dept: OBGYN CLINIC | Facility: CLINIC | Age: 32
End: 2022-09-14

## 2022-09-14 ENCOUNTER — TELEPHONE (OUTPATIENT)
Dept: PHYSICAL THERAPY | Facility: HOSPITAL | Age: 32
End: 2022-09-14

## 2022-09-16 ENCOUNTER — TELEPHONE (OUTPATIENT)
Dept: PHYSICAL THERAPY | Facility: HOSPITAL | Age: 32
End: 2022-09-16

## 2022-09-16 ENCOUNTER — APPOINTMENT (OUTPATIENT)
Dept: PHYSICAL THERAPY | Age: 32
End: 2022-09-16
Attending: FAMILY MEDICINE
Payer: COMMERCIAL

## 2022-09-18 ENCOUNTER — APPOINTMENT (OUTPATIENT)
Dept: ULTRASOUND IMAGING | Age: 32
End: 2022-09-18
Attending: EMERGENCY MEDICINE

## 2022-09-18 ENCOUNTER — HOSPITAL ENCOUNTER (EMERGENCY)
Age: 32
Discharge: HOME | End: 2022-09-18
Attending: EMERGENCY MEDICINE

## 2022-09-18 ENCOUNTER — APPOINTMENT (OUTPATIENT)
Dept: CT IMAGING | Age: 32
End: 2022-09-18
Attending: EMERGENCY MEDICINE

## 2022-09-18 VITALS
RESPIRATION RATE: 16 BRPM | TEMPERATURE: 99 F | SYSTOLIC BLOOD PRESSURE: 113 MMHG | BODY MASS INDEX: 24.11 KG/M2 | DIASTOLIC BLOOD PRESSURE: 73 MMHG | WEIGHT: 150 LBS | HEIGHT: 66 IN | HEART RATE: 74 BPM | OXYGEN SATURATION: 99 %

## 2022-09-18 DIAGNOSIS — Z15.09 BRCA GENE MUTATION TEST POSITIVE: ICD-10-CM

## 2022-09-18 DIAGNOSIS — D25.9 UTERINE LEIOMYOMA, UNSPECIFIED LOCATION: ICD-10-CM

## 2022-09-18 DIAGNOSIS — C50.912 MALIGNANT NEOPLASM OF LEFT BREAST IN FEMALE, ESTROGEN RECEPTOR POSITIVE, UNSPECIFIED SITE OF BREAST (HCC): ICD-10-CM

## 2022-09-18 DIAGNOSIS — N83.201 RIGHT OVARIAN CYST: Primary | ICD-10-CM

## 2022-09-18 DIAGNOSIS — R10.2 PELVIC PAIN: ICD-10-CM

## 2022-09-18 DIAGNOSIS — Z15.01 BRCA GENE MUTATION TEST POSITIVE: ICD-10-CM

## 2022-09-18 DIAGNOSIS — Z17.0 MALIGNANT NEOPLASM OF LEFT BREAST IN FEMALE, ESTROGEN RECEPTOR POSITIVE, UNSPECIFIED SITE OF BREAST (HCC): ICD-10-CM

## 2022-09-18 LAB
ALBUMIN SERPL-MCNC: 3.8 G/DL (ref 3.4–5)
ALBUMIN/GLOB SERPL: 1.4 {RATIO} (ref 1–2)
ALP LIVER SERPL-CCNC: 59 U/L
ALT SERPL-CCNC: 13 U/L
ANION GAP SERPL CALC-SCNC: 8 MMOL/L (ref 0–18)
AST SERPL-CCNC: 8 U/L (ref 15–37)
BASOPHILS # BLD AUTO: 0.03 X10(3) UL (ref 0–0.2)
BASOPHILS NFR BLD AUTO: 0.8 %
BILIRUB SERPL-MCNC: 0.7 MG/DL (ref 0.1–2)
BILIRUB UR QL STRIP.AUTO: NEGATIVE
BUN BLD-MCNC: 13 MG/DL (ref 7–18)
CALCIUM BLD-MCNC: 8.6 MG/DL (ref 8.5–10.1)
CHLORIDE SERPL-SCNC: 108 MMOL/L (ref 98–112)
CLARITY UR REFRACT.AUTO: CLEAR
CO2 SERPL-SCNC: 24 MMOL/L (ref 21–32)
COLOR UR AUTO: YELLOW
CREAT BLD-MCNC: 0.8 MG/DL
EOSINOPHIL # BLD AUTO: 0.04 X10(3) UL (ref 0–0.7)
EOSINOPHIL NFR BLD AUTO: 1 %
ERYTHROCYTE [DISTWIDTH] IN BLOOD BY AUTOMATED COUNT: 12.2 %
GFR SERPLBLD BASED ON 1.73 SQ M-ARVRAT: 100 ML/MIN/1.73M2 (ref 60–?)
GLOBULIN PLAS-MCNC: 2.8 G/DL (ref 2.8–4.4)
GLUCOSE BLD-MCNC: 97 MG/DL (ref 70–99)
GLUCOSE UR STRIP.AUTO-MCNC: NEGATIVE MG/DL
HCT VFR BLD AUTO: 33.8 %
HGB BLD-MCNC: 11.6 G/DL
IMM GRANULOCYTES # BLD AUTO: 0.01 X10(3) UL (ref 0–1)
IMM GRANULOCYTES NFR BLD: 0.3 %
KETONES UR STRIP.AUTO-MCNC: NEGATIVE MG/DL
LEUKOCYTE ESTERASE UR QL STRIP.AUTO: NEGATIVE
LYMPHOCYTES # BLD AUTO: 1.64 X10(3) UL (ref 1–4)
LYMPHOCYTES NFR BLD AUTO: 43 %
MCH RBC QN AUTO: 30.4 PG (ref 26–34)
MCHC RBC AUTO-ENTMCNC: 34.3 G/DL (ref 31–37)
MCV RBC AUTO: 88.7 FL
MONOCYTES # BLD AUTO: 0.24 X10(3) UL (ref 0.1–1)
MONOCYTES NFR BLD AUTO: 6.3 %
NEUTROPHILS # BLD AUTO: 1.85 X10 (3) UL (ref 1.5–7.7)
NEUTROPHILS # BLD AUTO: 1.85 X10(3) UL (ref 1.5–7.7)
NEUTROPHILS NFR BLD AUTO: 48.6 %
NITRITE UR QL STRIP.AUTO: NEGATIVE
OSMOLALITY SERPL CALC.SUM OF ELEC: 290 MOSM/KG (ref 275–295)
PH UR STRIP.AUTO: 5 [PH] (ref 5–8)
PLATELET # BLD AUTO: 195 10(3)UL (ref 150–450)
POTASSIUM SERPL-SCNC: 3.5 MMOL/L (ref 3.5–5.1)
PROT SERPL-MCNC: 6.6 G/DL (ref 6.4–8.2)
PROT UR STRIP.AUTO-MCNC: NEGATIVE MG/DL
RBC # BLD AUTO: 3.81 X10(6)UL
SARS-COV-2 RNA RESP QL NAA+PROBE: NOT DETECTED
SODIUM SERPL-SCNC: 140 MMOL/L (ref 136–145)
SP GR UR STRIP.AUTO: <=1.005 (ref 1–1.03)
UROBILINOGEN UR STRIP.AUTO-MCNC: 0.2 MG/DL
WBC # BLD AUTO: 3.8 X10(3) UL (ref 4–11)

## 2022-09-18 PROCEDURE — 96376 TX/PRO/DX INJ SAME DRUG ADON: CPT

## 2022-09-18 PROCEDURE — 81015 MICROSCOPIC EXAM OF URINE: CPT | Performed by: EMERGENCY MEDICINE

## 2022-09-18 PROCEDURE — 76856 US EXAM PELVIC COMPLETE: CPT | Performed by: EMERGENCY MEDICINE

## 2022-09-18 PROCEDURE — 76830 TRANSVAGINAL US NON-OB: CPT | Performed by: EMERGENCY MEDICINE

## 2022-09-18 PROCEDURE — 96361 HYDRATE IV INFUSION ADD-ON: CPT

## 2022-09-18 PROCEDURE — 96374 THER/PROPH/DIAG INJ IV PUSH: CPT

## 2022-09-18 PROCEDURE — 74177 CT ABD & PELVIS W/CONTRAST: CPT | Performed by: EMERGENCY MEDICINE

## 2022-09-18 PROCEDURE — 93975 VASCULAR STUDY: CPT | Performed by: EMERGENCY MEDICINE

## 2022-09-18 PROCEDURE — 96375 TX/PRO/DX INJ NEW DRUG ADDON: CPT

## 2022-09-18 PROCEDURE — 80053 COMPREHEN METABOLIC PANEL: CPT | Performed by: EMERGENCY MEDICINE

## 2022-09-18 PROCEDURE — 81001 URINALYSIS AUTO W/SCOPE: CPT | Performed by: EMERGENCY MEDICINE

## 2022-09-18 PROCEDURE — 85025 COMPLETE CBC W/AUTO DIFF WBC: CPT | Performed by: EMERGENCY MEDICINE

## 2022-09-18 PROCEDURE — 99285 EMERGENCY DEPT VISIT HI MDM: CPT

## 2022-09-18 RX ORDER — ONDANSETRON 2 MG/ML
4 INJECTION INTRAMUSCULAR; INTRAVENOUS ONCE
Status: COMPLETED | OUTPATIENT
Start: 2022-09-18 | End: 2022-09-18

## 2022-09-18 RX ORDER — HYDROCODONE BITARTRATE AND ACETAMINOPHEN 5; 325 MG/1; MG/1
1-2 TABLET ORAL EVERY 6 HOURS PRN
Qty: 10 TABLET | Refills: 0 | Status: SHIPPED | OUTPATIENT
Start: 2022-09-18 | End: 2022-09-23

## 2022-09-18 RX ORDER — HYDROMORPHONE HYDROCHLORIDE 1 MG/ML
1 INJECTION, SOLUTION INTRAMUSCULAR; INTRAVENOUS; SUBCUTANEOUS ONCE
Status: COMPLETED | OUTPATIENT
Start: 2022-09-18 | End: 2022-09-18

## 2022-09-18 RX ORDER — HYDROMORPHONE HYDROCHLORIDE 1 MG/ML
0.5 INJECTION, SOLUTION INTRAMUSCULAR; INTRAVENOUS; SUBCUTANEOUS ONCE
Status: COMPLETED | OUTPATIENT
Start: 2022-09-18 | End: 2022-09-18

## 2022-09-19 ENCOUNTER — APPOINTMENT (OUTPATIENT)
Dept: PHYSICAL THERAPY | Age: 32
End: 2022-09-19
Attending: FAMILY MEDICINE
Payer: COMMERCIAL

## 2022-09-21 ENCOUNTER — OFFICE VISIT (OUTPATIENT)
Dept: HEMATOLOGY/ONCOLOGY | Age: 32
End: 2022-09-21
Attending: SPECIALIST

## 2022-09-21 VITALS
HEIGHT: 65.98 IN | TEMPERATURE: 100 F | SYSTOLIC BLOOD PRESSURE: 144 MMHG | OXYGEN SATURATION: 100 % | WEIGHT: 150.69 LBS | BODY MASS INDEX: 24.22 KG/M2 | HEART RATE: 91 BPM | DIASTOLIC BLOOD PRESSURE: 96 MMHG

## 2022-09-21 DIAGNOSIS — Z17.0 MALIGNANT NEOPLASM OF OVERLAPPING SITES OF LEFT BREAST IN FEMALE, ESTROGEN RECEPTOR POSITIVE (HCC): Primary | ICD-10-CM

## 2022-09-21 DIAGNOSIS — Z15.01 BREAST CANCER, BRCA2 POSITIVE, LEFT (HCC): ICD-10-CM

## 2022-09-21 DIAGNOSIS — D25.9 UTERINE LEIOMYOMA, UNSPECIFIED LOCATION: ICD-10-CM

## 2022-09-21 DIAGNOSIS — Z15.01 BRCA2 GENE MUTATION POSITIVE IN FEMALE: ICD-10-CM

## 2022-09-21 DIAGNOSIS — Z79.810 LONG-TERM CURRENT USE OF TAMOXIFEN: ICD-10-CM

## 2022-09-21 DIAGNOSIS — Z91.89 HIGH RISK OF OVARIAN CANCER: ICD-10-CM

## 2022-09-21 DIAGNOSIS — C50.812 MALIGNANT NEOPLASM OF OVERLAPPING SITES OF LEFT BREAST IN FEMALE, ESTROGEN RECEPTOR POSITIVE (HCC): Primary | ICD-10-CM

## 2022-09-21 DIAGNOSIS — Z51.11 CHEMOTHERAPY MANAGEMENT, ENCOUNTER FOR: ICD-10-CM

## 2022-09-21 DIAGNOSIS — C50.912 BREAST CANCER, BRCA2 POSITIVE, LEFT (HCC): ICD-10-CM

## 2022-09-21 DIAGNOSIS — Z15.09 BRCA2 GENE MUTATION POSITIVE IN FEMALE: ICD-10-CM

## 2022-09-21 DIAGNOSIS — Z15.01 BRCA2 POSITIVE: ICD-10-CM

## 2022-09-21 DIAGNOSIS — Z15.09 BRCA2 POSITIVE: ICD-10-CM

## 2022-09-21 DIAGNOSIS — Z15.02 BRCA2 GENE MUTATION POSITIVE IN FEMALE: ICD-10-CM

## 2022-09-21 PROCEDURE — 99215 OFFICE O/P EST HI 40 MIN: CPT | Performed by: SPECIALIST

## 2022-09-21 PROCEDURE — 96413 CHEMO IV INFUSION 1 HR: CPT

## 2022-09-21 PROCEDURE — 96417 CHEMO IV INFUS EACH ADDL SEQ: CPT

## 2022-09-21 NOTE — PROGRESS NOTES
Patient is here today for follow up with Doctor Adonis Solano  for Malignant neoplasm of the breast C18D1 Trastuzumab, Pertuzumab and Tamoxifen therapy. Patient stated went to the ER on Sunday 9/18 for pelvic pain. Stated had a cyst. Was given Hydrocodone Acetaminophen for pain. Feeling better. Stated Constipation. Medication list,medical history and toxicities were reviewed and updated. Education Record    Learner:  Patient      Disease / Diagnosis: Malignant neoplasm of the breast     Barriers / Limitations:  None   Comments:    Method:  Brief focused, Discussion, Printed material and Reinforcement   Comments:    General Topics:  Medication, Pain, Procedure and Plan of care reviewed   Comments:    Outcome:  Shows understanding   Comments:  Zoë Duron MD visit. Patient sent to waiting room. Treating RN notified. AVS provided and follow up reviewed. Patient instructed to call as needed.

## 2022-09-23 ENCOUNTER — OFFICE VISIT (OUTPATIENT)
Dept: PHYSICAL THERAPY | Age: 32
End: 2022-09-23
Attending: FAMILY MEDICINE
Payer: COMMERCIAL

## 2022-09-23 PROCEDURE — 97162 PT EVAL MOD COMPLEX 30 MIN: CPT

## 2022-09-23 PROCEDURE — 97112 NEUROMUSCULAR REEDUCATION: CPT

## 2022-09-26 ENCOUNTER — APPOINTMENT (OUTPATIENT)
Dept: PHYSICAL THERAPY | Age: 32
End: 2022-09-26
Attending: FAMILY MEDICINE
Payer: COMMERCIAL

## 2022-09-30 ENCOUNTER — APPOINTMENT (OUTPATIENT)
Dept: PHYSICAL THERAPY | Age: 32
End: 2022-09-30
Attending: FAMILY MEDICINE
Payer: COMMERCIAL

## 2022-10-03 ENCOUNTER — APPOINTMENT (OUTPATIENT)
Dept: PHYSICAL THERAPY | Age: 32
End: 2022-10-03
Attending: FAMILY MEDICINE
Payer: COMMERCIAL

## 2022-10-05 ENCOUNTER — TELEPHONE (OUTPATIENT)
Dept: PHYSICAL THERAPY | Facility: HOSPITAL | Age: 32
End: 2022-10-05

## 2022-10-06 ENCOUNTER — APPOINTMENT (OUTPATIENT)
Dept: PHYSICAL THERAPY | Age: 32
End: 2022-10-06
Attending: FAMILY MEDICINE
Payer: COMMERCIAL

## 2022-10-07 ENCOUNTER — APPOINTMENT (OUTPATIENT)
Dept: PHYSICAL THERAPY | Age: 32
End: 2022-10-07
Attending: FAMILY MEDICINE
Payer: COMMERCIAL

## 2022-10-11 ENCOUNTER — OFFICE VISIT (OUTPATIENT)
Dept: PHYSICAL THERAPY | Age: 32
End: 2022-10-11
Attending: FAMILY MEDICINE
Payer: COMMERCIAL

## 2022-10-11 DIAGNOSIS — S40.012D CONTUSION OF LEFT SHOULDER, SUBSEQUENT ENCOUNTER: ICD-10-CM
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
## 2022-10-11 DIAGNOSIS — S16.1XXD CERVICAL MYOFASCIAL STRAIN, SUBSEQUENT ENCOUNTER: Primary | ICD-10-CM

## 2022-10-11 DIAGNOSIS — S80.11XD CONTUSION OF RIGHT LOWER LEG, SUBSEQUENT ENCOUNTER: ICD-10-CM

## 2022-10-11 PROCEDURE — 97140 MANUAL THERAPY 1/> REGIONS: CPT

## 2022-10-11 PROCEDURE — 97110 THERAPEUTIC EXERCISES: CPT

## 2022-10-13 ENCOUNTER — OFFICE VISIT (OUTPATIENT)
Dept: PHYSICAL THERAPY | Age: 32
End: 2022-10-13
Attending: FAMILY MEDICINE
Payer: COMMERCIAL

## 2022-10-13 DIAGNOSIS — S40.012D CONTUSION OF LEFT SHOULDER, SUBSEQUENT ENCOUNTER: ICD-10-CM
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
## 2022-10-13 DIAGNOSIS — S16.1XXD CERVICAL MYOFASCIAL STRAIN, SUBSEQUENT ENCOUNTER: Primary | ICD-10-CM

## 2022-10-13 PROCEDURE — 97110 THERAPEUTIC EXERCISES: CPT

## 2022-10-13 PROCEDURE — 97140 MANUAL THERAPY 1/> REGIONS: CPT

## 2022-10-18 ENCOUNTER — TELEPHONE (OUTPATIENT)
Dept: PHYSICAL THERAPY | Age: 32
End: 2022-10-18

## 2022-10-18 ENCOUNTER — APPOINTMENT (OUTPATIENT)
Dept: PHYSICAL THERAPY | Age: 32
End: 2022-10-18
Attending: FAMILY MEDICINE
Payer: COMMERCIAL

## 2022-10-18 NOTE — TELEPHONE ENCOUNTER
Physical Therapist called pt to inquire about cancelling upcoming appointments. Spoke to pt, and she something come up with her family, but that she would like to continue her therapy. Encouraged pt to call scheduling number to reschedule when she knew her availability. Pt verbalized understanding.

## 2022-10-20 ENCOUNTER — OFFICE VISIT (OUTPATIENT)
Dept: PHYSICAL THERAPY | Age: 32
End: 2022-10-20
Attending: FAMILY MEDICINE
Payer: COMMERCIAL

## 2022-10-20 ENCOUNTER — APPOINTMENT (OUTPATIENT)
Dept: PHYSICAL THERAPY | Age: 32
End: 2022-10-20
Attending: FAMILY MEDICINE
Payer: COMMERCIAL

## 2022-10-20 DIAGNOSIS — S40.012D CONTUSION OF LEFT SHOULDER, SUBSEQUENT ENCOUNTER: ICD-10-CM
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
## 2022-10-20 DIAGNOSIS — S16.1XXD CERVICAL MYOFASCIAL STRAIN, SUBSEQUENT ENCOUNTER: ICD-10-CM

## 2022-10-20 PROCEDURE — 97110 THERAPEUTIC EXERCISES: CPT

## 2022-10-20 PROCEDURE — 97112 NEUROMUSCULAR REEDUCATION: CPT

## 2022-10-20 PROCEDURE — 97140 MANUAL THERAPY 1/> REGIONS: CPT

## 2022-10-21 ENCOUNTER — HOSPITAL ENCOUNTER (EMERGENCY)
Age: 32
Discharge: HOME | End: 2022-10-21
Attending: EMERGENCY MEDICINE
Payer: COMMERCIAL

## 2022-10-21 ENCOUNTER — APPOINTMENT (OUTPATIENT)
Dept: CT IMAGING | Age: 32
End: 2022-10-21
Attending: EMERGENCY MEDICINE
Payer: COMMERCIAL

## 2022-10-21 VITALS
HEIGHT: 66 IN | OXYGEN SATURATION: 100 % | DIASTOLIC BLOOD PRESSURE: 84 MMHG | SYSTOLIC BLOOD PRESSURE: 130 MMHG | HEART RATE: 90 BPM | TEMPERATURE: 97 F | BODY MASS INDEX: 24.11 KG/M2 | WEIGHT: 150 LBS | RESPIRATION RATE: 16 BRPM

## 2022-10-21 DIAGNOSIS — M54.2 NECK PAIN: ICD-10-CM

## 2022-10-21 DIAGNOSIS — R51.9 HEADACHE BEHIND THE EYES: Primary | ICD-10-CM

## 2022-10-21 LAB
ANION GAP SERPL CALC-SCNC: 6 MMOL/L (ref 0–18)
BASOPHILS # BLD AUTO: 0.02 X10(3) UL (ref 0–0.2)
BASOPHILS NFR BLD AUTO: 0.6 %
BUN BLD-MCNC: 8 MG/DL (ref 7–18)
CALCIUM BLD-MCNC: 9 MG/DL (ref 8.5–10.1)
CHLORIDE SERPL-SCNC: 110 MMOL/L (ref 98–112)
CO2 SERPL-SCNC: 23 MMOL/L (ref 21–32)
CREAT BLD-MCNC: 0.84 MG/DL
EOSINOPHIL # BLD AUTO: 0.03 X10(3) UL (ref 0–0.7)
EOSINOPHIL NFR BLD AUTO: 0.9 %
ERYTHROCYTE [DISTWIDTH] IN BLOOD BY AUTOMATED COUNT: 12.2 %
GFR SERPLBLD BASED ON 1.73 SQ M-ARVRAT: 95 ML/MIN/1.73M2 (ref 60–?)
GLUCOSE BLD-MCNC: 103 MG/DL (ref 70–99)
HCT VFR BLD AUTO: 36.8 %
HGB BLD-MCNC: 12.7 G/DL
IMM GRANULOCYTES # BLD AUTO: 0.01 X10(3) UL (ref 0–1)
IMM GRANULOCYTES NFR BLD: 0.3 %
LYMPHOCYTES # BLD AUTO: 1.32 X10(3) UL (ref 1–4)
LYMPHOCYTES NFR BLD AUTO: 38.6 %
MCH RBC QN AUTO: 30.5 PG (ref 26–34)
MCHC RBC AUTO-ENTMCNC: 34.5 G/DL (ref 31–37)
MCV RBC AUTO: 88.2 FL
MONOCYTES # BLD AUTO: 0.19 X10(3) UL (ref 0.1–1)
MONOCYTES NFR BLD AUTO: 5.6 %
NEUTROPHILS # BLD AUTO: 1.85 X10 (3) UL (ref 1.5–7.7)
NEUTROPHILS # BLD AUTO: 1.85 X10(3) UL (ref 1.5–7.7)
NEUTROPHILS NFR BLD AUTO: 54 %
OSMOLALITY SERPL CALC.SUM OF ELEC: 287 MOSM/KG (ref 275–295)
PLATELET # BLD AUTO: 234 10(3)UL (ref 150–450)
POTASSIUM SERPL-SCNC: 3.8 MMOL/L (ref 3.5–5.1)
RBC # BLD AUTO: 4.17 X10(6)UL
SODIUM SERPL-SCNC: 139 MMOL/L (ref 136–145)
WBC # BLD AUTO: 3.4 X10(3) UL (ref 4–11)

## 2022-10-21 PROCEDURE — 96374 THER/PROPH/DIAG INJ IV PUSH: CPT

## 2022-10-21 PROCEDURE — 70496 CT ANGIOGRAPHY HEAD: CPT | Performed by: EMERGENCY MEDICINE

## 2022-10-21 PROCEDURE — 85025 COMPLETE CBC W/AUTO DIFF WBC: CPT | Performed by: EMERGENCY MEDICINE

## 2022-10-21 PROCEDURE — 99284 EMERGENCY DEPT VISIT MOD MDM: CPT

## 2022-10-21 PROCEDURE — 80048 BASIC METABOLIC PNL TOTAL CA: CPT | Performed by: EMERGENCY MEDICINE

## 2022-10-21 RX ORDER — CYCLOBENZAPRINE HCL 5 MG
5 TABLET ORAL 3 TIMES DAILY PRN
Qty: 15 TABLET | Refills: 0 | Status: SHIPPED | OUTPATIENT
Start: 2022-10-21

## 2022-10-21 RX ORDER — KETOROLAC TROMETHAMINE 15 MG/ML
15 INJECTION, SOLUTION INTRAMUSCULAR; INTRAVENOUS ONCE
Status: COMPLETED | OUTPATIENT
Start: 2022-10-21 | End: 2022-10-21

## 2022-10-24 ENCOUNTER — HOSPITAL ENCOUNTER (OUTPATIENT)
Dept: ULTRASOUND IMAGING | Age: 32
Discharge: HOME | End: 2022-10-24
Attending: OBSTETRICS & GYNECOLOGY
Payer: COMMERCIAL

## 2022-10-24 DIAGNOSIS — Z15.01 BRCA GENE POSITIVE: ICD-10-CM

## 2022-10-24 DIAGNOSIS — Z15.09 BRCA GENE POSITIVE: ICD-10-CM

## 2022-10-24 PROCEDURE — 76856 US EXAM PELVIC COMPLETE: CPT | Performed by: OBSTETRICS & GYNECOLOGY

## 2022-10-24 PROCEDURE — 76830 TRANSVAGINAL US NON-OB: CPT | Performed by: OBSTETRICS & GYNECOLOGY

## 2022-10-27 ENCOUNTER — OFFICE VISIT (OUTPATIENT)
Dept: OBGYN CLINIC | Facility: CLINIC | Age: 32
End: 2022-10-27
Payer: COMMERCIAL

## 2022-10-27 VITALS
HEART RATE: 72 BPM | BODY MASS INDEX: 24.19 KG/M2 | DIASTOLIC BLOOD PRESSURE: 76 MMHG | WEIGHT: 150.5 LBS | SYSTOLIC BLOOD PRESSURE: 112 MMHG | HEIGHT: 65.98 IN

## 2022-10-27 DIAGNOSIS — R87.615 ENCOUNTER FOR REPEAT PAP SMEAR DUE TO PREVIOUS INSUFFICIENT CERVICAL CELLS: Primary | ICD-10-CM

## 2022-10-27 PROCEDURE — 3074F SYST BP LT 130 MM HG: CPT | Performed by: NURSE PRACTITIONER

## 2022-10-27 PROCEDURE — 99213 OFFICE O/P EST LOW 20 MIN: CPT | Performed by: NURSE PRACTITIONER

## 2022-10-27 PROCEDURE — 3078F DIAST BP <80 MM HG: CPT | Performed by: NURSE PRACTITIONER

## 2022-10-27 PROCEDURE — 3008F BODY MASS INDEX DOCD: CPT | Performed by: NURSE PRACTITIONER

## 2022-10-31 ENCOUNTER — APPOINTMENT (OUTPATIENT)
Dept: PHYSICAL THERAPY | Age: 32
End: 2022-10-31
Attending: FAMILY MEDICINE
Payer: COMMERCIAL

## 2022-11-02 ENCOUNTER — OFFICE VISIT (OUTPATIENT)
Dept: PHYSICAL THERAPY | Age: 32
End: 2022-11-02
Attending: FAMILY MEDICINE
Payer: COMMERCIAL

## 2022-11-02 DIAGNOSIS — S16.1XXD CERVICAL MYOFASCIAL STRAIN, SUBSEQUENT ENCOUNTER: ICD-10-CM

## 2022-11-02 DIAGNOSIS — S40.012D CONTUSION OF LEFT SHOULDER, SUBSEQUENT ENCOUNTER: ICD-10-CM
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
## 2022-11-02 PROCEDURE — 97110 THERAPEUTIC EXERCISES: CPT

## 2022-11-02 PROCEDURE — 97140 MANUAL THERAPY 1/> REGIONS: CPT

## 2022-11-05 ENCOUNTER — HOSPITAL ENCOUNTER (OUTPATIENT)
Age: 32
Discharge: HOME | End: 2022-11-05
Payer: COMMERCIAL

## 2022-11-05 VITALS
RESPIRATION RATE: 16 BRPM | HEART RATE: 70 BPM | OXYGEN SATURATION: 100 % | DIASTOLIC BLOOD PRESSURE: 84 MMHG | SYSTOLIC BLOOD PRESSURE: 134 MMHG | TEMPERATURE: 98 F | WEIGHT: 150 LBS | BODY MASS INDEX: 24.11 KG/M2 | HEIGHT: 66 IN

## 2022-11-05 DIAGNOSIS — H66.90 ACUTE OTITIS MEDIA, UNSPECIFIED OTITIS MEDIA TYPE: Primary | ICD-10-CM

## 2022-11-05 PROCEDURE — 99213 OFFICE O/P EST LOW 20 MIN: CPT

## 2022-11-05 RX ORDER — AMOXICILLIN 500 MG/1
1000 TABLET, FILM COATED ORAL 2 TIMES DAILY
Qty: 30 TABLET | Refills: 0 | Status: SHIPPED | OUTPATIENT
Start: 2022-11-05 | End: 2022-11-10

## 2022-11-07 LAB — HPV I/H RISK 1 DNA SPEC QL NAA+PROBE: NEGATIVE

## 2022-11-16 ENCOUNTER — APPOINTMENT (OUTPATIENT)
Dept: HEMATOLOGY/ONCOLOGY | Facility: HOSPITAL | Age: 32
End: 2022-11-16
Attending: OBSTETRICS & GYNECOLOGY
Payer: COMMERCIAL

## 2022-11-23 ENCOUNTER — APPOINTMENT (OUTPATIENT)
Dept: HEMATOLOGY/ONCOLOGY | Facility: HOSPITAL | Age: 32
End: 2022-11-23
Attending: SPECIALIST
Payer: COMMERCIAL

## 2022-11-25 RX ORDER — TAMOXIFEN CITRATE 20 MG/1
20 TABLET ORAL DAILY
Qty: 90 TABLET | Refills: 0 | Status: SHIPPED | OUTPATIENT
Start: 2022-11-25

## 2022-12-14 ENCOUNTER — OFFICE VISIT (OUTPATIENT)
Dept: HEMATOLOGY/ONCOLOGY | Age: 32
End: 2022-12-14
Attending: SPECIALIST
Payer: COMMERCIAL

## 2022-12-14 ENCOUNTER — NURSE ONLY (OUTPATIENT)
Dept: HEMATOLOGY/ONCOLOGY | Age: 32
End: 2022-12-14
Attending: SPECIALIST
Payer: COMMERCIAL

## 2022-12-14 VITALS
TEMPERATURE: 100 F | BODY MASS INDEX: 25 KG/M2 | WEIGHT: 152 LBS | SYSTOLIC BLOOD PRESSURE: 130 MMHG | HEART RATE: 73 BPM | DIASTOLIC BLOOD PRESSURE: 84 MMHG | OXYGEN SATURATION: 98 %

## 2022-12-14 DIAGNOSIS — Z15.02 BRCA2 GENE MUTATION POSITIVE IN FEMALE: ICD-10-CM

## 2022-12-14 DIAGNOSIS — Z91.89 HIGH RISK OF OVARIAN CANCER: ICD-10-CM

## 2022-12-14 DIAGNOSIS — D25.9 UTERINE LEIOMYOMA, UNSPECIFIED LOCATION: ICD-10-CM

## 2022-12-14 DIAGNOSIS — C50.912 BREAST CANCER, BRCA2 POSITIVE, LEFT (HCC): ICD-10-CM

## 2022-12-14 DIAGNOSIS — C50.812 MALIGNANT NEOPLASM OF OVERLAPPING SITES OF LEFT BREAST IN FEMALE, ESTROGEN RECEPTOR POSITIVE (HCC): Primary | ICD-10-CM

## 2022-12-14 DIAGNOSIS — Z15.01 BRCA2 GENE MUTATION POSITIVE IN FEMALE: ICD-10-CM

## 2022-12-14 DIAGNOSIS — Z15.09 BRCA2 GENE MUTATION POSITIVE IN FEMALE: ICD-10-CM

## 2022-12-14 DIAGNOSIS — Z15.09 BRCA2 POSITIVE: ICD-10-CM

## 2022-12-14 DIAGNOSIS — Z15.01 BRCA2 POSITIVE: ICD-10-CM

## 2022-12-14 DIAGNOSIS — Z15.01 BREAST CANCER, BRCA2 POSITIVE, LEFT (HCC): ICD-10-CM

## 2022-12-14 DIAGNOSIS — Z79.810 LONG-TERM CURRENT USE OF TAMOXIFEN: ICD-10-CM

## 2022-12-14 DIAGNOSIS — Z17.0 MALIGNANT NEOPLASM OF OVERLAPPING SITES OF LEFT BREAST IN FEMALE, ESTROGEN RECEPTOR POSITIVE (HCC): Primary | ICD-10-CM

## 2022-12-14 PROCEDURE — 96523 IRRIG DRUG DELIVERY DEVICE: CPT

## 2022-12-14 PROCEDURE — 99214 OFFICE O/P EST MOD 30 MIN: CPT | Performed by: SPECIALIST

## 2022-12-14 NOTE — PROGRESS NOTES
Patient is here today for follow up with Peter Hoang for Invasive Ductal Carcinoma Right Breast. Currently on Tamoxifen therapy. Patient denies pain. Denied adverse side effects from Tamoxifen therapy. Patient declined breast exam today. Medication list,medical history and toxicities were reviewed and updated. Education Record    Learner:  Patient      Disease / Diagnosis: Invasive Ductal Carcinoma    Barriers / Limitations:  None   Comments:    Method:  Brief focused, Discussion, Printed material and Reinforcement   Comments:    General Topics:  Medication, Pain, Procedure and Plan of care reviewed   Comments:    Outcome:  Shows understanding   Comments    AVS provided and follow up reviewed. Patient instructed to call as needed.

## 2022-12-20 ENCOUNTER — OFFICE VISIT (OUTPATIENT)
Dept: SURGERY | Facility: CLINIC | Age: 32
End: 2022-12-20
Payer: COMMERCIAL

## 2022-12-20 VITALS — WEIGHT: 152 LBS | HEIGHT: 66.14 IN | BODY MASS INDEX: 24.43 KG/M2

## 2022-12-20 DIAGNOSIS — Z90.13 ABSENCE OF BOTH BREASTS: Primary | ICD-10-CM

## 2022-12-20 PROCEDURE — 99212 OFFICE O/P EST SF 10 MIN: CPT | Performed by: SURGERY

## 2022-12-20 PROCEDURE — 3008F BODY MASS INDEX DOCD: CPT | Performed by: SURGERY

## 2022-12-20 NOTE — PROGRESS NOTES
Paulo Marvin is a 28year old female who presents today for a follow-up. Since her last visit, the patient is received multiple second opinions regarding autologous reconstruction. She reports that her second opinions also felt that her her abdominal donor site is limited. She is also considering a thigh based reconstruction elsewhere. Physical examination:  Bilateral breast incisions are clean dry and intact. Good shape and symmetry is noted. Abdomen: Limited autologous donor site is noted. Assessment and plan: We again reviewed options for reconstruction. I have advised against abdominal free flap reconstruction as I believe that it would lead to a suboptimal aesthetic outcome. The patient is also considering a thigh based reconstruction elsewhere. Regarding implants, we discussed the option of placement of smooth, round, silicone implants and fat grafting. The nature of the procedure was reviewed with the patient. We discussed the risks of surgery including but not limited to bleeding, infection, scarring, delayed wound healing, asymmetry, implant malposition, implant infection or extrusion requiring removal, ALCL, capsular contracture, hypertrophic scarring or keloid, injury to intra-abdominal structures, contour abnormalities, cysts or calcifications requiring biopsy, and need for further surgery. We reviewed the expected postoperative course including possible need for drains, as well as need for activity limitation and compression. Multiple questions were answered the patient's satisfaction. No guarantees as to outcome were offered. Patient is considering her options and will inform us if she wishes to proceed.

## 2022-12-20 NOTE — PATIENT INSTRUCTIONS
Surgeon:         Dr. Stacey Doss                                        Tel:         [phone]                                  Fax:        [phone]    Surgery/Procedure:   Second stage reconstruction with removal of bilateral breast tissue expanders, placement of permanent implants and fat grafting. 3 hours, general anesthesia outpatient. Hospital:  [facility]: [address] [place] [address] Ria Garcia, [address]           [phone]  [facility]: [address], [facility], [facility]               [phone]    1. Someone will need to drive you to and from the hospital if your procedure is outpatient. 2.Do not drink alcohol or smoke 24 hours prior to your procedure. 3. Bring a picture ID and your insurance card. 4. You will be contacted by the hospital the day before to confirm the procedure time and location. 5. The hospital will also contact you approximately one week before surgery to schedule your COVID test.     6. Do not take any herbal supplements or blood thinners at least one week before your procedure/surgery. This includes NSAID's (aspirin, baby aspirin, Motrin, Ibuprofen, Aleve, Advil, Naproxen, etc), Plavix, fish oil, vitamin E, turmeric, CoQ10, or green tea supplements, etc. *TYLENOL or acetaminophen is ok to take*    7. PRE-OPERATIVE TESTING: History and physical with medical clearance is REQUIRED within 30 days of the surgery date and is mandatory per Dr. Manuela Arce. *If this is not done, your surgery will be postponed*  MEDICAL CLEARANCE WITH DR. Clare Lo  CBC  CMP  EKG  Hold Tamoxifen for two weeks before surgery and restart two weeks after surgery    8. Please inform us if you develop any Covid-19 like symptoms, test positive or have been exposed for Covid- 19 prior to surgery.      Consent obtained   Photos taken on 12/20/22

## 2022-12-23 ENCOUNTER — TELEPHONE (OUTPATIENT)
Dept: INTERNAL MEDICINE CLINIC | Facility: CLINIC | Age: 32
End: 2022-12-23

## 2022-12-23 DIAGNOSIS — Z01.818 PREOPERATIVE EXAMINATION: Primary | ICD-10-CM

## 2022-12-23 DIAGNOSIS — Z90.13 ABSENCE OF BOTH BREASTS: ICD-10-CM

## 2022-12-23 NOTE — TELEPHONE ENCOUNTER
Please call patient. We received pre-op clearance request from Dr. Saranya Jones office. Surgery date is listed as TBD. I ordered her labs and EKG to [facility] they are requesting and they require these things and to see me within 30 days of her surgery, but I'm not sure when she should schedule with me since I don't have a surgery date listed. Does she know when she is planning surgery?

## 2022-12-23 NOTE — TELEPHONE ENCOUNTER
Spoke with patient and she does not have a Surgery date yet. Patient will call us to schedule as soon as she has a date set up.

## 2023-01-13 ENCOUNTER — TELEPHONE (OUTPATIENT)
Dept: SURGERY | Facility: CLINIC | Age: 33
End: 2023-01-13

## 2023-01-13 NOTE — TELEPHONE ENCOUNTER
Called patient to schedule surgery patient informed me she is having another surgery elsewhere and will call us back if she wants to continue with Dr. Marlee Galeazzi

## 2023-03-14 ENCOUNTER — OFFICE VISIT (OUTPATIENT)
Dept: HEMATOLOGY/ONCOLOGY | Age: 33
End: 2023-03-14
Attending: SPECIALIST
Payer: COMMERCIAL

## 2023-03-14 ENCOUNTER — NURSE ONLY (OUTPATIENT)
Dept: HEMATOLOGY/ONCOLOGY | Age: 33
End: 2023-03-14
Attending: SPECIALIST
Payer: COMMERCIAL

## 2023-03-14 VITALS
HEIGHT: 65.98 IN | OXYGEN SATURATION: 99 % | DIASTOLIC BLOOD PRESSURE: 75 MMHG | WEIGHT: 161 LBS | TEMPERATURE: 99 F | BODY MASS INDEX: 25.88 KG/M2 | HEART RATE: 80 BPM | SYSTOLIC BLOOD PRESSURE: 133 MMHG

## 2023-03-14 DIAGNOSIS — C50.912 BREAST CANCER, BRCA2 POSITIVE, LEFT (HCC): ICD-10-CM

## 2023-03-14 DIAGNOSIS — Z17.0 MALIGNANT NEOPLASM OF OVERLAPPING SITES OF LEFT BREAST IN FEMALE, ESTROGEN RECEPTOR POSITIVE (HCC): ICD-10-CM

## 2023-03-14 DIAGNOSIS — Z79.810 LONG-TERM CURRENT USE OF TAMOXIFEN: ICD-10-CM

## 2023-03-14 DIAGNOSIS — Z15.09 BRCA2 POSITIVE: ICD-10-CM

## 2023-03-14 DIAGNOSIS — Z15.09 BRCA2 GENE MUTATION POSITIVE IN FEMALE: ICD-10-CM

## 2023-03-14 DIAGNOSIS — C50.812 MALIGNANT NEOPLASM OF OVERLAPPING SITES OF LEFT BREAST IN FEMALE, ESTROGEN RECEPTOR POSITIVE (HCC): Primary | ICD-10-CM

## 2023-03-14 DIAGNOSIS — Z15.02 BRCA2 GENE MUTATION POSITIVE IN FEMALE: ICD-10-CM

## 2023-03-14 DIAGNOSIS — Z17.0 MALIGNANT NEOPLASM OF OVERLAPPING SITES OF LEFT BREAST IN FEMALE, ESTROGEN RECEPTOR POSITIVE (HCC): Primary | ICD-10-CM

## 2023-03-14 DIAGNOSIS — Z15.01 BREAST CANCER, BRCA2 POSITIVE, LEFT (HCC): ICD-10-CM

## 2023-03-14 DIAGNOSIS — Z15.01 BRCA2 POSITIVE: ICD-10-CM

## 2023-03-14 DIAGNOSIS — Z91.89 HIGH RISK OF OVARIAN CANCER: ICD-10-CM

## 2023-03-14 DIAGNOSIS — C50.812 MALIGNANT NEOPLASM OF OVERLAPPING SITES OF LEFT BREAST IN FEMALE, ESTROGEN RECEPTOR POSITIVE (HCC): ICD-10-CM

## 2023-03-14 DIAGNOSIS — Z15.01 BRCA2 GENE MUTATION POSITIVE IN FEMALE: ICD-10-CM

## 2023-03-14 LAB
ALBUMIN SERPL-MCNC: 3.8 G/DL (ref 3.4–5)
ALBUMIN/GLOB SERPL: 1.1 {RATIO} (ref 1–2)
ALP LIVER SERPL-CCNC: 54 U/L
ALT SERPL-CCNC: 15 U/L
ANION GAP SERPL CALC-SCNC: 7 MMOL/L (ref 0–18)
AST SERPL-CCNC: 13 U/L (ref 15–37)
BASOPHILS # BLD AUTO: 0.04 X10(3) UL (ref 0–0.2)
BASOPHILS NFR BLD AUTO: 0.8 %
BILIRUB SERPL-MCNC: 0.8 MG/DL (ref 0.1–2)
BUN BLD-MCNC: 13 MG/DL (ref 7–18)
CALCIUM BLD-MCNC: 8.5 MG/DL (ref 8.5–10.1)
CHLORIDE SERPL-SCNC: 103 MMOL/L (ref 98–112)
CO2 SERPL-SCNC: 26 MMOL/L (ref 21–32)
CREAT BLD-MCNC: 0.79 MG/DL
EOSINOPHIL # BLD AUTO: 0.05 X10(3) UL (ref 0–0.7)
EOSINOPHIL NFR BLD AUTO: 1.1 %
ERYTHROCYTE [DISTWIDTH] IN BLOOD BY AUTOMATED COUNT: 12.3 %
FASTING STATUS PATIENT QL REPORTED: NO
GFR SERPLBLD BASED ON 1.73 SQ M-ARVRAT: 101 ML/MIN/1.73M2 (ref 60–?)
GLOBULIN PLAS-MCNC: 3.5 G/DL (ref 2.8–4.4)
GLUCOSE BLD-MCNC: 92 MG/DL (ref 70–99)
HCT VFR BLD AUTO: 36.1 %
HGB BLD-MCNC: 12.4 G/DL
IMM GRANULOCYTES # BLD AUTO: 0.01 X10(3) UL (ref 0–1)
IMM GRANULOCYTES NFR BLD: 0.2 %
LYMPHOCYTES # BLD AUTO: 2.23 X10(3) UL (ref 1–4)
LYMPHOCYTES NFR BLD AUTO: 47.3 %
MCH RBC QN AUTO: 29.7 PG (ref 26–34)
MCHC RBC AUTO-ENTMCNC: 34.3 G/DL (ref 31–37)
MCV RBC AUTO: 86.6 FL
MONOCYTES # BLD AUTO: 0.32 X10(3) UL (ref 0.1–1)
MONOCYTES NFR BLD AUTO: 6.8 %
NEUTROPHILS # BLD AUTO: 2.06 X10 (3) UL (ref 1.5–7.7)
NEUTROPHILS # BLD AUTO: 2.06 X10(3) UL (ref 1.5–7.7)
NEUTROPHILS NFR BLD AUTO: 43.8 %
OSMOLALITY SERPL CALC.SUM OF ELEC: 282 MOSM/KG (ref 275–295)
PLATELET # BLD AUTO: 206 10(3)UL (ref 150–450)
POTASSIUM SERPL-SCNC: 3.5 MMOL/L (ref 3.5–5.1)
PROT SERPL-MCNC: 7.3 G/DL (ref 6.4–8.2)
RBC # BLD AUTO: 4.17 X10(6)UL
SODIUM SERPL-SCNC: 136 MMOL/L (ref 136–145)
WBC # BLD AUTO: 4.7 X10(3) UL (ref 4–11)

## 2023-03-14 PROCEDURE — 85025 COMPLETE CBC W/AUTO DIFF WBC: CPT

## 2023-03-14 PROCEDURE — 80053 COMPREHEN METABOLIC PANEL: CPT

## 2023-03-14 PROCEDURE — 36591 DRAW BLOOD OFF VENOUS DEVICE: CPT

## 2023-03-14 PROCEDURE — 99215 OFFICE O/P EST HI 40 MIN: CPT | Performed by: SPECIALIST

## 2023-03-14 NOTE — PROGRESS NOTES
Education Record    Learner:  Patient    Disease / Diagnosis: Breast ca. Here for CLL and MD appointment    Barriers / Limitations:  None   Comments:    Method:  Discussion   Comments:    General Topics:  Procedure and Plan of care reviewed   Comments:    Outcome:  Shows understanding   Comments:    Labs drawn via port per order. Patient discharged to MD appointment in stable condition.

## 2023-03-14 NOTE — PROGRESS NOTES
Here for 3mth MD fu visit   Continues on Tamoxifen daily   Had period in January, last about 5 days  +HF  Denies pain   Labs drawn   Education Record    Learner:  Patient    Disease / Diagnosis:    Barriers / Limitations:  None   Comments:    Method:  Brief focused    Comments:    General Topics:   Plan of care reviewed   Comments:    Outcome:  Shows understanding   Comments:  Zoladex patient information sheet given to pt

## 2023-03-23 ENCOUNTER — TELEPHONE (OUTPATIENT)
Dept: HEMATOLOGY/ONCOLOGY | Age: 33
End: 2023-03-23

## 2023-03-23 ENCOUNTER — DOCUMENTATION ONLY (OUTPATIENT)
Dept: HEMATOLOGY/ONCOLOGY | Facility: HOSPITAL | Age: 33
End: 2023-03-23

## 2023-03-23 NOTE — PROGRESS NOTES
THE MEDICAL [place] [facility] Progress Note      Patient Name: Vance Mercer   YOB: 1990  Medical Record Number: [id number]  Attending Physician: Zina Butcher M.D. The [id number] makes medical notes like these available to patients in the interest of transparency. Please be advised this is a medical document. Medical documents are intended to carry relevant information, facts as evident, and the clinical opinion of the practitioner. The medical note is intended as peer to peer communication and may appear blunt or direct. It is written in medical language and may contain abbreviations or verbiage that are unfamiliar. Date of Encounter: 3/14/2023    Patient was scheduled to come in for first goserelin injection but called stating that she no longer wished to pursue this therapy. Electronically signed by:    Zina Butcher M.D.   Associate Medical Director of Oncology [facility], [place], [place]

## 2023-03-23 NOTE — TELEPHONE ENCOUNTER
Katty Iqbal is calling to say that she is canceling her Zoladex infusion for 3/23/23 @1:30 pm she on loner wants this. I spoke with Donzell Saint the charge nurse in [facility] infusion. And she asked that I reach out to [address]  and let her know patient canceled her appointment.  Thanks [employer]

## 2023-04-10 ENCOUNTER — TELEPHONE (OUTPATIENT)
Dept: INTERNAL MEDICINE CLINIC | Facility: CLINIC | Age: 33
End: 2023-04-10

## 2023-04-10 NOTE — TELEPHONE ENCOUNTER
Medical records request from [facility] and [facility]  Made copies and sent to scanning and to scan stat

## 2023-04-18 ENCOUNTER — APPOINTMENT (OUTPATIENT)
Dept: HEMATOLOGY/ONCOLOGY | Age: 33
End: 2023-04-18
Attending: SPECIALIST
Payer: COMMERCIAL

## 2023-06-27 ENCOUNTER — NURSE ONLY (OUTPATIENT)
Dept: HEMATOLOGY/ONCOLOGY | Age: 33
End: 2023-06-27
Attending: SPECIALIST
Payer: COMMERCIAL

## 2023-06-27 ENCOUNTER — OFFICE VISIT (OUTPATIENT)
Dept: HEMATOLOGY/ONCOLOGY | Age: 33
End: 2023-06-27
Attending: SPECIALIST
Payer: COMMERCIAL

## 2023-06-27 VITALS
OXYGEN SATURATION: 100 % | SYSTOLIC BLOOD PRESSURE: 125 MMHG | RESPIRATION RATE: 18 BRPM | TEMPERATURE: 98 F | HEART RATE: 83 BPM | DIASTOLIC BLOOD PRESSURE: 80 MMHG | WEIGHT: 159.88 LBS | HEIGHT: 65.98 IN | BODY MASS INDEX: 25.69 KG/M2

## 2023-06-27 DIAGNOSIS — Z15.09 BRCA2 GENE MUTATION POSITIVE IN FEMALE: ICD-10-CM

## 2023-06-27 DIAGNOSIS — Z15.01 BRCA2 GENE MUTATION POSITIVE IN FEMALE: ICD-10-CM

## 2023-06-27 DIAGNOSIS — Z15.09 BRCA2 POSITIVE: ICD-10-CM

## 2023-06-27 DIAGNOSIS — Z15.01 BRCA2 POSITIVE: Primary | ICD-10-CM

## 2023-06-27 DIAGNOSIS — Z15.02 BRCA2 GENE MUTATION POSITIVE IN FEMALE: ICD-10-CM

## 2023-06-27 DIAGNOSIS — Z91.89 HIGH RISK OF OVARIAN CANCER: ICD-10-CM

## 2023-06-27 DIAGNOSIS — Z15.01 BRCA2 POSITIVE: ICD-10-CM

## 2023-06-27 DIAGNOSIS — Z79.810 LONG-TERM CURRENT USE OF TAMOXIFEN: ICD-10-CM

## 2023-06-27 DIAGNOSIS — Z15.09 BRCA2 POSITIVE: Primary | ICD-10-CM

## 2023-06-27 PROCEDURE — 86304 IMMUNOASSAY TUMOR CA 125: CPT

## 2023-06-27 PROCEDURE — 36591 DRAW BLOOD OFF VENOUS DEVICE: CPT

## 2023-06-27 PROCEDURE — 99213 OFFICE O/P EST LOW 20 MIN: CPT | Performed by: SPECIALIST

## 2023-06-28 LAB — CANCER AG125 SERPL-ACNC: 7.5 U/ML (ref ?–35)

## 2023-07-03 ENCOUNTER — HOSPITAL ENCOUNTER (OUTPATIENT)
Age: 33
Discharge: HOME | End: 2023-07-03
Payer: COMMERCIAL

## 2023-07-03 VITALS
HEIGHT: 60 IN | SYSTOLIC BLOOD PRESSURE: 121 MMHG | DIASTOLIC BLOOD PRESSURE: 85 MMHG | OXYGEN SATURATION: 100 % | HEART RATE: 60 BPM | TEMPERATURE: 98 F | WEIGHT: 160 LBS | BODY MASS INDEX: 31.41 KG/M2 | RESPIRATION RATE: 20 BRPM

## 2023-07-03 DIAGNOSIS — J01.90 ACUTE NON-RECURRENT SINUSITIS, UNSPECIFIED LOCATION: Primary | ICD-10-CM

## 2023-07-03 PROCEDURE — 99214 OFFICE O/P EST MOD 30 MIN: CPT

## 2023-07-03 PROCEDURE — 99213 OFFICE O/P EST LOW 20 MIN: CPT

## 2023-07-03 RX ORDER — AMOXICILLIN AND CLAVULANATE POTASSIUM 875; 125 MG/1; MG/1
1 TABLET, FILM COATED ORAL 2 TIMES DAILY
Qty: 20 TABLET | Refills: 0 | Status: SHIPPED | OUTPATIENT
Start: 2023-07-03 | End: 2023-07-13

## 2023-07-03 NOTE — DISCHARGE INSTRUCTIONS
Take antibiotic as directed. Saline rinses as needed. Use Mucinex or Sudafed.   Follow-up with ENT if symptoms persist.

## 2023-07-10 ENCOUNTER — HOSPITAL ENCOUNTER (OUTPATIENT)
Dept: ULTRASOUND IMAGING | Facility: HOSPITAL | Age: 33
Discharge: HOME | End: 2023-07-10
Attending: SPECIALIST
Payer: COMMERCIAL

## 2023-07-10 DIAGNOSIS — Z15.09 BRCA2 POSITIVE: ICD-10-CM

## 2023-07-10 DIAGNOSIS — Z15.01 BRCA2 POSITIVE: ICD-10-CM

## 2023-07-10 PROCEDURE — 76830 TRANSVAGINAL US NON-OB: CPT | Performed by: SPECIALIST

## 2023-08-07 ENCOUNTER — TELEPHONE (OUTPATIENT)
Dept: SURGERY | Facility: CLINIC | Age: 33
End: 2023-08-07

## 2023-08-07 DIAGNOSIS — Z90.13 ABSENCE OF BOTH BREASTS: Primary | ICD-10-CM

## 2023-08-07 NOTE — TELEPHONE ENCOUNTER
Calling pt in regards to scheduling surgery. Informed pt that I have 12/20/2023 available at [facility] with Dr. Karolina Sparks. Pt verbalized understanding and in agreement with date and location. All questions answered. Encouraged pt to call or [employer] message office with any other questions or concerns.

## 2023-08-16 ENCOUNTER — TELEPHONE (OUTPATIENT)
Dept: INTERNAL MEDICINE CLINIC | Facility: CLINIC | Age: 33
End: 2023-08-16

## 2023-08-16 DIAGNOSIS — Z01.818 PREOPERATIVE EXAMINATION: Primary | ICD-10-CM

## 2023-08-16 DIAGNOSIS — Z90.13 ABSENCE OF BOTH BREASTS: ICD-10-CM

## 2023-08-16 NOTE — TELEPHONE ENCOUNTER
We received pre op request from Dr. Kurtis Brown for surgery on 12/14/23-breast surgery-paperwork placed in AMS folder for review

## 2023-08-21 NOTE — TELEPHONE ENCOUNTER
Talked to patient and told her to get labs and EKG done prior to appointment  Appointment scheduled   Future Appointments   Date Time Provider Umer Glass   11/21/2023  9:00 AM Misael Hooks DO [facility] [address]

## 2023-08-21 NOTE — TELEPHONE ENCOUNTER
Please call patient to schedule pre-op appt for sometime between 11/15/23 and 12/1/23. She needs labs and EKG prior. Orders are at [facility]. 15 mins. Surgery is 12/14/23.

## 2023-08-30 ENCOUNTER — PATIENT MESSAGE (OUTPATIENT)
Dept: HEMATOLOGY/ONCOLOGY | Age: 33
End: 2023-08-30

## 2023-08-30 DIAGNOSIS — Z15.09 BRCA2 GENE MUTATION POSITIVE IN FEMALE: Primary | ICD-10-CM

## 2023-08-30 DIAGNOSIS — Z15.01 BRCA2 GENE MUTATION POSITIVE IN FEMALE: Primary | ICD-10-CM

## 2023-08-30 DIAGNOSIS — Z15.02 BRCA2 GENE MUTATION POSITIVE IN FEMALE: Primary | ICD-10-CM

## 2023-10-26 ENCOUNTER — OFFICE VISIT (OUTPATIENT)
Dept: HEMATOLOGY/ONCOLOGY | Facility: HOSPITAL | Age: 33
End: 2023-10-26
Attending: SPECIALIST

## 2023-10-26 VITALS
RESPIRATION RATE: 18 BRPM | TEMPERATURE: 98 F | WEIGHT: 164.5 LBS | OXYGEN SATURATION: 100 % | HEART RATE: 70 BPM | SYSTOLIC BLOOD PRESSURE: 147 MMHG | BODY MASS INDEX: 32 KG/M2 | DIASTOLIC BLOOD PRESSURE: 87 MMHG

## 2023-10-26 DIAGNOSIS — Z15.01 BRCA2 POSITIVE: ICD-10-CM

## 2023-10-26 DIAGNOSIS — Z15.01 BREAST CANCER, BRCA2 POSITIVE, LEFT: ICD-10-CM

## 2023-10-26 DIAGNOSIS — Z17.0 MALIGNANT NEOPLASM OF OVERLAPPING SITES OF LEFT BREAST IN FEMALE, ESTROGEN RECEPTOR POSITIVE: ICD-10-CM

## 2023-10-26 DIAGNOSIS — Z91.89 HIGH RISK OF OVARIAN CANCER: ICD-10-CM

## 2023-10-26 DIAGNOSIS — Z15.09 BRCA2 GENE MUTATION POSITIVE IN FEMALE: Primary | ICD-10-CM

## 2023-10-26 DIAGNOSIS — Z15.02 BRCA2 GENE MUTATION POSITIVE IN FEMALE: Primary | ICD-10-CM

## 2023-10-26 DIAGNOSIS — C50.812 MALIGNANT NEOPLASM OF OVERLAPPING SITES OF LEFT BREAST IN FEMALE, ESTROGEN RECEPTOR POSITIVE: ICD-10-CM

## 2023-10-26 DIAGNOSIS — C50.912 BREAST CANCER, BRCA2 POSITIVE, LEFT: ICD-10-CM

## 2023-10-26 DIAGNOSIS — Z15.09 BRCA2 POSITIVE: ICD-10-CM

## 2023-10-26 DIAGNOSIS — Z15.01 BRCA2 GENE MUTATION POSITIVE IN FEMALE: Primary | ICD-10-CM

## 2023-10-26 PROCEDURE — 99214 OFFICE O/P EST MOD 30 MIN: CPT | Performed by: SPECIALIST

## 2023-10-26 NOTE — PROGRESS NOTES
Patient is here for MD f/u for Breast cancer/BRCA2+. Patient discontinued Tamoxifen in June due to pending surgery. Surgery was cancelled and patient restarted Tamoxifen in July. She had been taking it up until one month ago but discontinued again due to generalized aches and ovarian cysts. Patient would like to discuss next steps. She is scheduled for breast reconstruction on 12/14.        Education Record    Learner:  Patient    Disease / Diagnosis:  Breast cancer     Barriers / Limitations:  None   Comments:    Method:  Discussion   Comments:    General Topics:  Plan of care reviewed   Comments:    Outcome:  Shows understanding   Comments:

## 2023-10-27 PROBLEM — E28.39 SUPPRESSION OF OVARIAN SECRETION: Status: ACTIVE | Noted: 2023-10-27

## 2023-11-01 ENCOUNTER — APPOINTMENT (OUTPATIENT)
Dept: HEMATOLOGY/ONCOLOGY | Age: 33
End: 2023-11-01
Attending: SPECIALIST
Payer: COMMERCIAL

## 2023-11-02 ENCOUNTER — OFFICE VISIT (OUTPATIENT)
Dept: HEMATOLOGY/ONCOLOGY | Age: 33
End: 2023-11-02
Attending: SPECIALIST
Payer: COMMERCIAL

## 2023-11-02 DIAGNOSIS — C50.812 MALIGNANT NEOPLASM OF OVERLAPPING SITES OF LEFT BREAST IN FEMALE, ESTROGEN RECEPTOR POSITIVE: ICD-10-CM

## 2023-11-02 DIAGNOSIS — E28.39 SUPPRESSION OF OVARIAN SECRETION: Primary | ICD-10-CM

## 2023-11-02 DIAGNOSIS — Z17.0 MALIGNANT NEOPLASM OF OVERLAPPING SITES OF LEFT BREAST IN FEMALE, ESTROGEN RECEPTOR POSITIVE: ICD-10-CM

## 2023-11-02 PROCEDURE — 96402 CHEMO HORMON ANTINEOPL SQ/IM: CPT

## 2023-11-21 ENCOUNTER — LAB ENCOUNTER (OUTPATIENT)
Dept: LAB | Age: 33
End: 2023-11-21
Attending: FAMILY MEDICINE
Payer: COMMERCIAL

## 2023-11-21 ENCOUNTER — OFFICE VISIT (OUTPATIENT)
Dept: INTERNAL MEDICINE CLINIC | Facility: CLINIC | Age: 33
End: 2023-11-21
Payer: COMMERCIAL

## 2023-11-21 VITALS
HEIGHT: 60 IN | RESPIRATION RATE: 18 BRPM | DIASTOLIC BLOOD PRESSURE: 68 MMHG | OXYGEN SATURATION: 98 % | SYSTOLIC BLOOD PRESSURE: 120 MMHG | WEIGHT: 165.19 LBS | HEART RATE: 64 BPM | BODY MASS INDEX: 32.43 KG/M2

## 2023-11-21 DIAGNOSIS — Z90.13 ABSENCE OF BOTH BREASTS: ICD-10-CM

## 2023-11-21 DIAGNOSIS — Z01.818 PREOPERATIVE EXAMINATION: Primary | ICD-10-CM

## 2023-11-21 DIAGNOSIS — Z01.818 PREOPERATIVE EXAMINATION: ICD-10-CM

## 2023-11-21 LAB
ALBUMIN SERPL-MCNC: 3.8 G/DL (ref 3.4–5)
ALBUMIN/GLOB SERPL: 1 {RATIO} (ref 1–2)
ALP LIVER SERPL-CCNC: 58 U/L
ALT SERPL-CCNC: 12 U/L
ANION GAP SERPL CALC-SCNC: 7 MMOL/L (ref 0–18)
AST SERPL-CCNC: 10 U/L (ref 15–37)
BASOPHILS # BLD AUTO: 0.03 X10(3) UL (ref 0–0.2)
BASOPHILS NFR BLD AUTO: 0.7 %
BILIRUB SERPL-MCNC: 0.5 MG/DL (ref 0.1–2)
BUN BLD-MCNC: 16 MG/DL (ref 9–23)
CALCIUM BLD-MCNC: 9 MG/DL (ref 8.5–10.1)
CHLORIDE SERPL-SCNC: 109 MMOL/L (ref 98–112)
CO2 SERPL-SCNC: 26 MMOL/L (ref 21–32)
CREAT BLD-MCNC: 0.87 MG/DL
EGFRCR SERPLBLD CKD-EPI 2021: 90 ML/MIN/1.73M2 (ref 60–?)
EOSINOPHIL # BLD AUTO: 0.09 X10(3) UL (ref 0–0.7)
EOSINOPHIL NFR BLD AUTO: 2.1 %
ERYTHROCYTE [DISTWIDTH] IN BLOOD BY AUTOMATED COUNT: 12.3 %
FASTING STATUS PATIENT QL REPORTED: NO
GLOBULIN PLAS-MCNC: 3.7 G/DL (ref 2.8–4.4)
GLUCOSE BLD-MCNC: 84 MG/DL (ref 70–99)
HCT VFR BLD AUTO: 39.8 %
HGB BLD-MCNC: 13 G/DL
IMM GRANULOCYTES # BLD AUTO: 0.01 X10(3) UL (ref 0–1)
IMM GRANULOCYTES NFR BLD: 0.2 %
LYMPHOCYTES # BLD AUTO: 1.91 X10(3) UL (ref 1–4)
LYMPHOCYTES NFR BLD AUTO: 45.4 %
MCH RBC QN AUTO: 29.2 PG (ref 26–34)
MCHC RBC AUTO-ENTMCNC: 32.7 G/DL (ref 31–37)
MCV RBC AUTO: 89.4 FL
MONOCYTES # BLD AUTO: 0.27 X10(3) UL (ref 0.1–1)
MONOCYTES NFR BLD AUTO: 6.4 %
NEUTROPHILS # BLD AUTO: 1.9 X10 (3) UL (ref 1.5–7.7)
NEUTROPHILS # BLD AUTO: 1.9 X10(3) UL (ref 1.5–7.7)
NEUTROPHILS NFR BLD AUTO: 45.2 %
OSMOLALITY SERPL CALC.SUM OF ELEC: 294 MOSM/KG (ref 275–295)
PLATELET # BLD AUTO: 239 10(3)UL (ref 150–450)
POTASSIUM SERPL-SCNC: 3.4 MMOL/L (ref 3.5–5.1)
PROT SERPL-MCNC: 7.5 G/DL (ref 6.4–8.2)
RBC # BLD AUTO: 4.45 X10(6)UL
SODIUM SERPL-SCNC: 142 MMOL/L (ref 136–145)
WBC # BLD AUTO: 4.2 X10(3) UL (ref 4–11)

## 2023-11-21 PROCEDURE — 85025 COMPLETE CBC W/AUTO DIFF WBC: CPT | Performed by: FAMILY MEDICINE

## 2023-11-21 PROCEDURE — 3074F SYST BP LT 130 MM HG: CPT | Performed by: FAMILY MEDICINE

## 2023-11-21 PROCEDURE — 3008F BODY MASS INDEX DOCD: CPT | Performed by: FAMILY MEDICINE

## 2023-11-21 PROCEDURE — 93000 ELECTROCARDIOGRAM COMPLETE: CPT | Performed by: FAMILY MEDICINE

## 2023-11-21 PROCEDURE — 3078F DIAST BP <80 MM HG: CPT | Performed by: FAMILY MEDICINE

## 2023-11-21 PROCEDURE — 80053 COMPREHEN METABOLIC PANEL: CPT | Performed by: FAMILY MEDICINE

## 2023-11-21 PROCEDURE — 99214 OFFICE O/P EST MOD 30 MIN: CPT | Performed by: FAMILY MEDICINE

## 2023-11-22 ENCOUNTER — TELEPHONE (OUTPATIENT)
Dept: INTERNAL MEDICINE CLINIC | Facility: CLINIC | Age: 33
End: 2023-11-22

## 2023-11-22 LAB
ATRIAL RATE: 62 BPM
P AXIS: 63 DEGREES
P-R INTERVAL: 140 MS
Q-T INTERVAL: 398 MS
QRS DURATION: 88 MS
QTC CALCULATION (BEZET): 403 MS
R AXIS: 72 DEGREES
T AXIS: 68 DEGREES
VENTRICULAR RATE: 62 BPM

## 2023-11-22 NOTE — PROGRESS NOTES
Subjective:   Patient ID: Birgit Chu is a 35year old female. HPI Here for preoperative exam for planned Second stage reconstruction with removal of bilateral breast tissue expanders, placement of permanent implants and fat grafting scheduled for 12/14/23 with Dr. Froylan Faria. Patient has tolerated anesthesia in the past with no complications. History/Other:   Past Medical History:   Diagnosis Date    Anesthesia complication     Back problem     Cancer ([address]     breast    Personal history of antineoplastic chemotherapy 01/2022    last dose 1/2022    PONV (postoperative nausea and vomiting)      Past Surgical History:   Procedure Laterality Date    HC CHEMO ADMIN PERITONEAL CAVITY VIA PORT OR CATH  2021    MASTECTOMY LEFT  2022    MASTECTOMY RIGHT  2022    PORT, INDWELLING, IMP      WISDOM TEETH REMOVED  2010    no anesthesia     Social History     Socioeconomic History    Marital status: Single   Tobacco Use    Smoking status: Never    Smokeless tobacco: Never   Vaping Use    Vaping Use: Never used   Substance and Sexual Activity    Alcohol use: Never    Drug use: Never    Sexual activity: Not Currently   Other Topics Concern    Caffeine Concern Yes    Exercise No    Seat Belt Yes     Family History   Problem Relation Age of Onset    Cancer Maternal Aunt 29        Breast    Cancer Cousin 15       Review of Systems   Constitutional:  Negative for activity change, appetite change, fatigue and fever. HENT:  Negative for ear pain, hearing loss and rhinorrhea. Eyes:  Negative for visual disturbance. Respiratory:  Negative for cough and shortness of breath. Cardiovascular:  Negative for chest pain, palpitations and leg swelling. Gastrointestinal:  Negative for abdominal pain and constipation. Endocrine: Negative for polydipsia, polyphagia and polyuria. Genitourinary:  Negative for dysuria and frequency. Musculoskeletal:  Negative for arthralgias and joint swelling. Skin:  Negative for rash. Neurological:  Negative for dizziness, weakness, numbness and headaches. Hematological:  Negative for adenopathy. Does not bruise/bleed easily. Psychiatric/Behavioral:  Negative for dysphoric mood. The patient is not nervous/anxious. Current Outpatient Medications   Medication Sig Dispense Refill    goserelin acetate 3.6 MG Subcutaneous Implant Inject 3.6 mg into the skin every 30 (thirty) days. Allergies: Allergies   Allergen Reactions    Nitrofurantoin OTHER (SEE COMMENTS) and NAUSEA AND VOMITING     Vomiting after taking  It    Sulfamethoxazole W/Trimethoprim OTHER (SEE COMMENTS) and NAUSEA AND VOMITING     Swelling, drowsiness,       Objective:   Physical Exam  Vitals reviewed. Constitutional:       Appearance: Normal appearance. She is well-developed. HENT:      Head: Normocephalic and atraumatic. Right Ear: Tympanic membrane, ear canal and external ear normal.      Left Ear: Tympanic membrane, ear canal and external ear normal.      Mouth/Throat:      Pharynx: No posterior oropharyngeal erythema. Eyes:      Conjunctiva/sclera: Conjunctivae normal.      Pupils: Pupils are equal, round, and reactive to light. Cardiovascular:      Rate and Rhythm: Normal rate and regular rhythm. Heart sounds: Normal heart sounds. Pulmonary:      Effort: Pulmonary effort is normal.      Breath sounds: Normal breath sounds. Skin:     General: Skin is warm and dry. Neurological:      Mental Status: She is alert. Psychiatric:         Behavior: Behavior normal.         Assessment & Plan:   1. Preoperative examination    2. Absence of both breasts    Reviewed EKG and labs. Proceed as planned. Patient is at acceptable risk level for surgery.      Orders Placed This Encounter   Procedures    Comp Metabolic Panel (14)    CBC With Differential With Platelet       Meds This Visit:  Requested Prescriptions      No prescriptions requested or ordered in this encounter       Imaging & Referrals:  ELECTROCARDIOGRAM, COMPLETE

## 2023-11-22 NOTE — TELEPHONE ENCOUNTER
Please fax my H&P, labs, EKG (all of which are attached to pre-op paperwork in my folder) to appropriate location. Surgery is 12/14/23.

## 2023-11-29 ENCOUNTER — OFFICE VISIT (OUTPATIENT)
Dept: HEMATOLOGY/ONCOLOGY | Age: 33
End: 2023-11-29
Attending: SPECIALIST
Payer: COMMERCIAL

## 2023-11-29 ENCOUNTER — TELEPHONE (OUTPATIENT)
Dept: HEMATOLOGY/ONCOLOGY | Facility: HOSPITAL | Age: 33
End: 2023-11-29

## 2023-11-29 ENCOUNTER — APPOINTMENT (OUTPATIENT)
Dept: HEMATOLOGY/ONCOLOGY | Age: 33
End: 2023-11-29
Attending: SPECIALIST
Payer: COMMERCIAL

## 2023-11-29 NOTE — TELEPHONE ENCOUNTER
Kaylan calling this morning to cancel her appt with Lab for injection and Dr Elizondo. She said she does not want to continue with injections at this time. thor

## 2023-12-04 ENCOUNTER — TELEPHONE (OUTPATIENT)
Dept: SURGERY | Facility: CLINIC | Age: 33
End: 2023-12-04

## 2023-12-04 NOTE — TELEPHONE ENCOUNTER
Called pt to discuss message that was sent via [address] Pt stated there was an area above her mastectomy incision that scarred and she wanted to see if this could be fixed at the time of her next surgery. A photo of the scar was requested for Dr. Erika Thurman to review. ----- Message from Tushar Lane sent at 12/4/2023 10:54 AM CST -----  Regarding: Upcoming surgery  Contact: [phone]  Hello, I'd like to speak to the doctor about a scar revision before surgery.

## 2023-12-13 ENCOUNTER — ANESTHESIA EVENT (OUTPATIENT)
Dept: SURGERY | Facility: HOSPITAL | Age: 33
End: 2023-12-13
Payer: COMMERCIAL

## 2023-12-14 ENCOUNTER — ANESTHESIA (OUTPATIENT)
Dept: SURGERY | Facility: HOSPITAL | Age: 33
End: 2023-12-14
Payer: COMMERCIAL

## 2023-12-14 ENCOUNTER — HOSPITAL ENCOUNTER (OUTPATIENT)
Facility: HOSPITAL | Age: 33
Setting detail: HOSPITAL OUTPATIENT SURGERY
Discharge: HOME | End: 2023-12-14
Attending: SURGERY | Admitting: SURGERY
Payer: COMMERCIAL

## 2023-12-14 VITALS
WEIGHT: 165.19 LBS | OXYGEN SATURATION: 99 % | TEMPERATURE: 97 F | HEART RATE: 70 BPM | RESPIRATION RATE: 16 BRPM | HEIGHT: 66 IN | DIASTOLIC BLOOD PRESSURE: 87 MMHG | BODY MASS INDEX: 26.55 KG/M2 | SYSTOLIC BLOOD PRESSURE: 127 MMHG

## 2023-12-14 DIAGNOSIS — Z90.13 ABSENCE OF BOTH BREASTS: Primary | ICD-10-CM

## 2023-12-14 LAB — B-HCG UR QL: NEGATIVE

## 2023-12-14 PROCEDURE — 0HRV0JZ REPLACEMENT OF BILATERAL BREAST WITH SYNTHETIC SUBSTITUTE, OPEN APPROACH: ICD-10-PCS | Performed by: SURGERY

## 2023-12-14 PROCEDURE — 81025 URINE PREGNANCY TEST: CPT

## 2023-12-14 PROCEDURE — 88305 TISSUE EXAM BY PATHOLOGIST: CPT | Performed by: SURGERY

## 2023-12-14 PROCEDURE — 0JPT0XZ REMOVAL OF TUNNELED VASCULAR ACCESS DEVICE FROM TRUNK SUBCUTANEOUS TISSUE AND FASCIA, OPEN APPROACH: ICD-10-PCS | Performed by: SURGERY

## 2023-12-14 DEVICE — NATRELLE INSPIRA SSM 445CC MODERATE PROFILE  SMOOTH ROUND SILICONE
Type: IMPLANTABLE DEVICE | Site: BREAST | Status: FUNCTIONAL
Brand: NATRELLE INSPIRA SOFTTOUCH BREAST IMPLANTS

## 2023-12-14 RX ORDER — HYDROMORPHONE HYDROCHLORIDE 1 MG/ML
0.6 INJECTION, SOLUTION INTRAMUSCULAR; INTRAVENOUS; SUBCUTANEOUS EVERY 5 MIN PRN
Status: DISCONTINUED | OUTPATIENT
Start: 2023-12-14 | End: 2023-12-14

## 2023-12-14 RX ORDER — SODIUM CHLORIDE, SODIUM LACTATE, POTASSIUM CHLORIDE, CALCIUM CHLORIDE 600; 310; 30; 20 MG/100ML; MG/100ML; MG/100ML; MG/100ML
INJECTION, SOLUTION INTRAVENOUS CONTINUOUS
Status: DISCONTINUED | OUTPATIENT
Start: 2023-12-14 | End: 2023-12-14

## 2023-12-14 RX ORDER — KETAMINE HYDROCHLORIDE 50 MG/ML
INJECTION, SOLUTION, CONCENTRATE INTRAMUSCULAR; INTRAVENOUS AS NEEDED
Status: DISCONTINUED | OUTPATIENT
Start: 2023-12-14 | End: 2023-12-14 | Stop reason: SURG

## 2023-12-14 RX ORDER — SCOLOPAMINE TRANSDERMAL SYSTEM 1 MG/1
1 PATCH, EXTENDED RELEASE TRANSDERMAL ONCE
Status: DISCONTINUED | OUTPATIENT
Start: 2023-12-14 | End: 2023-12-14 | Stop reason: HOSPADM

## 2023-12-14 RX ORDER — HYDROMORPHONE HYDROCHLORIDE 1 MG/ML
0.4 INJECTION, SOLUTION INTRAMUSCULAR; INTRAVENOUS; SUBCUTANEOUS EVERY 5 MIN PRN
Status: DISCONTINUED | OUTPATIENT
Start: 2023-12-14 | End: 2023-12-14

## 2023-12-14 RX ORDER — HYDROCODONE BITARTRATE AND ACETAMINOPHEN 5; 325 MG/1; MG/1
2 TABLET ORAL ONCE AS NEEDED
Status: DISCONTINUED | OUTPATIENT
Start: 2023-12-14 | End: 2023-12-14

## 2023-12-14 RX ORDER — ACETAMINOPHEN 500 MG
1000 TABLET ORAL ONCE AS NEEDED
Status: DISCONTINUED | OUTPATIENT
Start: 2023-12-14 | End: 2023-12-14

## 2023-12-14 RX ORDER — MIDAZOLAM HYDROCHLORIDE 1 MG/ML
INJECTION INTRAMUSCULAR; INTRAVENOUS AS NEEDED
Status: DISCONTINUED | OUTPATIENT
Start: 2023-12-14 | End: 2023-12-14 | Stop reason: SURG

## 2023-12-14 RX ORDER — LIDOCAINE HYDROCHLORIDE AND EPINEPHRINE 10; 10 MG/ML; UG/ML
INJECTION, SOLUTION INFILTRATION; PERINEURAL AS NEEDED
Status: DISCONTINUED | OUTPATIENT
Start: 2023-12-14 | End: 2023-12-14 | Stop reason: HOSPADM

## 2023-12-14 RX ORDER — CEFAZOLIN SODIUM/WATER 2 G/20 ML
2 SYRINGE (ML) INTRAVENOUS ONCE
Status: COMPLETED | OUTPATIENT
Start: 2023-12-14 | End: 2023-12-14

## 2023-12-14 RX ORDER — CEFAZOLIN SODIUM/WATER 2 G/20 ML
SYRINGE (ML) INTRAVENOUS
Status: DISCONTINUED
Start: 2023-12-14 | End: 2023-12-14

## 2023-12-14 RX ORDER — LIDOCAINE HYDROCHLORIDE 10 MG/ML
INJECTION, SOLUTION EPIDURAL; INFILTRATION; INTRACAUDAL; PERINEURAL AS NEEDED
Status: DISCONTINUED | OUTPATIENT
Start: 2023-12-14 | End: 2023-12-14 | Stop reason: SURG

## 2023-12-14 RX ORDER — ONDANSETRON 4 MG/1
4 TABLET, FILM COATED ORAL EVERY 8 HOURS PRN
Qty: 12 TABLET | Refills: 0 | Status: SHIPPED | OUTPATIENT
Start: 2023-12-14

## 2023-12-14 RX ORDER — ACETAMINOPHEN 500 MG
1000 TABLET ORAL ONCE
Status: DISCONTINUED | OUTPATIENT
Start: 2023-12-14 | End: 2023-12-14 | Stop reason: HOSPADM

## 2023-12-14 RX ORDER — PROCHLORPERAZINE EDISYLATE 5 MG/ML
5 INJECTION INTRAMUSCULAR; INTRAVENOUS EVERY 8 HOURS PRN
Status: DISCONTINUED | OUTPATIENT
Start: 2023-12-14 | End: 2023-12-14

## 2023-12-14 RX ORDER — NALOXONE HYDROCHLORIDE 0.4 MG/ML
0.08 INJECTION, SOLUTION INTRAMUSCULAR; INTRAVENOUS; SUBCUTANEOUS AS NEEDED
Status: DISCONTINUED | OUTPATIENT
Start: 2023-12-14 | End: 2023-12-14

## 2023-12-14 RX ORDER — DOCUSATE SODIUM 100 MG/1
100 CAPSULE, LIQUID FILLED ORAL 2 TIMES DAILY
Qty: 30 CAPSULE | Refills: 1 | Status: SHIPPED | OUTPATIENT
Start: 2023-12-14

## 2023-12-14 RX ORDER — ONDANSETRON 2 MG/ML
4 INJECTION INTRAMUSCULAR; INTRAVENOUS EVERY 6 HOURS PRN
Status: DISCONTINUED | OUTPATIENT
Start: 2023-12-14 | End: 2023-12-14

## 2023-12-14 RX ORDER — CEPHALEXIN 500 MG/1
500 CAPSULE ORAL 4 TIMES DAILY
Qty: 20 CAPSULE | Refills: 0 | Status: SHIPPED | OUTPATIENT
Start: 2023-12-14 | End: 2023-12-19

## 2023-12-14 RX ORDER — DEXAMETHASONE SODIUM PHOSPHATE 4 MG/ML
VIAL (ML) INJECTION AS NEEDED
Status: DISCONTINUED | OUTPATIENT
Start: 2023-12-14 | End: 2023-12-14 | Stop reason: SURG

## 2023-12-14 RX ORDER — HYDROCODONE BITARTRATE AND ACETAMINOPHEN 5; 325 MG/1; MG/1
1 TABLET ORAL ONCE AS NEEDED
Status: DISCONTINUED | OUTPATIENT
Start: 2023-12-14 | End: 2023-12-14

## 2023-12-14 RX ORDER — ONDANSETRON 2 MG/ML
INJECTION INTRAMUSCULAR; INTRAVENOUS AS NEEDED
Status: DISCONTINUED | OUTPATIENT
Start: 2023-12-14 | End: 2023-12-14 | Stop reason: SURG

## 2023-12-14 RX ORDER — HYDROMORPHONE HYDROCHLORIDE 1 MG/ML
0.2 INJECTION, SOLUTION INTRAMUSCULAR; INTRAVENOUS; SUBCUTANEOUS EVERY 5 MIN PRN
Status: DISCONTINUED | OUTPATIENT
Start: 2023-12-14 | End: 2023-12-14

## 2023-12-14 RX ORDER — ROCURONIUM BROMIDE 10 MG/ML
INJECTION, SOLUTION INTRAVENOUS AS NEEDED
Status: DISCONTINUED | OUTPATIENT
Start: 2023-12-14 | End: 2023-12-14 | Stop reason: SURG

## 2023-12-14 RX ORDER — IBUPROFEN 600 MG/1
600 TABLET ORAL ONCE AS NEEDED
Status: DISCONTINUED | OUTPATIENT
Start: 2023-12-14 | End: 2023-12-14

## 2023-12-14 RX ORDER — HYDROCODONE BITARTRATE AND ACETAMINOPHEN 5; 325 MG/1; MG/1
1-2 TABLET ORAL EVERY 4 HOURS PRN
Qty: 30 TABLET | Refills: 0 | Status: SHIPPED | OUTPATIENT
Start: 2023-12-14

## 2023-12-14 RX ORDER — KETOROLAC TROMETHAMINE 30 MG/ML
INJECTION, SOLUTION INTRAMUSCULAR; INTRAVENOUS AS NEEDED
Status: DISCONTINUED | OUTPATIENT
Start: 2023-12-14 | End: 2023-12-14 | Stop reason: SURG

## 2023-12-14 RX ADMIN — ONDANSETRON 4 MG: 2 INJECTION INTRAMUSCULAR; INTRAVENOUS at 07:45:00

## 2023-12-14 RX ADMIN — ROCURONIUM BROMIDE 10 MG: 10 INJECTION, SOLUTION INTRAVENOUS at 07:36:00

## 2023-12-14 RX ADMIN — MIDAZOLAM HYDROCHLORIDE 2 MG: 1 INJECTION INTRAMUSCULAR; INTRAVENOUS at 07:34:00

## 2023-12-14 RX ADMIN — ROCURONIUM BROMIDE 40 MG: 10 INJECTION, SOLUTION INTRAVENOUS at 07:45:00

## 2023-12-14 RX ADMIN — KETOROLAC TROMETHAMINE 30 MG: 30 INJECTION, SOLUTION INTRAMUSCULAR; INTRAVENOUS at 09:06:00

## 2023-12-14 RX ADMIN — KETAMINE HYDROCHLORIDE 25 MG: 50 INJECTION, SOLUTION, CONCENTRATE INTRAMUSCULAR; INTRAVENOUS at 07:46:00

## 2023-12-14 RX ADMIN — CEFAZOLIN SODIUM/WATER 2 G: 2 G/20 ML SYRINGE (ML) INTRAVENOUS at 07:33:00

## 2023-12-14 RX ADMIN — LIDOCAINE HYDROCHLORIDE 25 MG: 10 INJECTION, SOLUTION EPIDURAL; INFILTRATION; INTRACAUDAL; PERINEURAL at 07:36:00

## 2023-12-14 RX ADMIN — SODIUM CHLORIDE, SODIUM LACTATE, POTASSIUM CHLORIDE, CALCIUM CHLORIDE: 600; 310; 30; 20 INJECTION, SOLUTION INTRAVENOUS at 07:46:00

## 2023-12-14 RX ADMIN — DEXAMETHASONE SODIUM PHOSPHATE 8 MG: 4 MG/ML VIAL (ML) INJECTION at 07:45:00

## 2023-12-14 RX ADMIN — SODIUM CHLORIDE, SODIUM LACTATE, POTASSIUM CHLORIDE, CALCIUM CHLORIDE: 600; 310; 30; 20 INJECTION, SOLUTION INTRAVENOUS at 09:13:00

## 2023-12-14 NOTE — H&P
No change in Dr. Missy Pichardo H&P from 11/21. We reviewed the plan for tissue expander removal, placement of smooth, round, silicone implants and fat grafting, and port removal. The patient declines fat grafting. We reviewed the risks of surgery including but not limited to bleeding, infection, scarring, delayed wound healing, asymmetry, implant malposition, implant infection or extrusion requiring removal, ALCL, capsular contracture, hypertrophic scarring or keloid, contour abnormalities, cysts or calcifications requiring biopsy, and need for further surgery. We reviewed the expected postoperative course including possible need for drains, as well as need for activity limitation and compression. Multiple questions were answered the patient's satisfaction. No guarantees as to outcome were offered. The patient expresses understanding and wishes to proceed.

## 2023-12-14 NOTE — OPERATIVE REPORT
[address]    PATIENT'S NAME: SKYLAR LAWRENCE   ATTENDING PHYSICIAN: Grant Dawson M.D. OPERATING PHYSICIAN: Grant Dawson M.D. PATIENT ACCOUNT#:   [de-identified]    LOCATION:  [facility] 21 EDWP 10  MEDICAL RECORD #:   [id number]       YOB: 1990  ADMISSION DATE:       12/14/2023      OPERATION DATE:  12/14/2023    OPERATIVE REPORT    PREOPERATIVE DIAGNOSIS:  History of bilateral mastectomy and tissue expander reconstruction. POSTOPERATIVE DIAGNOSIS:  History of bilateral mastectomy and tissue expander reconstruction. PROCEDURE:    1. Removal of bilateral breast tissue expanders. 2.   Placement of silicone gel implants, bilateral breasts. 3.   Removal of indwelling vascular access device. ASSISTANT:  VERONIQUE Vargas . ANESTHESIA:  General.      ESTIMATED BLOOD LOSS:  Trace. COMPLICATIONS:  None. INDICATIONS:  The patient is a 79-year-old female who previously underwent bilateral mastectomy and prepectoral tissue expander reconstruction. She completed expansion and now presents for exchange of permanent implants. She declines autologous fat grafting, understanding the possible risks of contour abnormalities and need for future procedures. As she has completed her adjuvant therapy, she also requests removal of her indwelling vascular access device. FINDINGS:  Bilateral breasts reconstructed with Tamiment Nickels SoftTouch breast implant, 445 mL, reference SSM-445, on the right serial #[id number], on the left serial #[id number]. OPERATIVE TECHNIQUE:  Informed consent was obtained from the patient. The risks, benefits, and alternatives were reviewed with the patient preoperatively. She expressed understanding and wished to proceed. The patient was marked in the preoperative holding area in the upright position. The midline and inframammary folds were marked. Bilateral inframammary fold scars were encompassed in transverse ellipses.   The was patient taken to the operating room, properly identified, placed in supine position. Sequential compression devices were placed on bilateral lower extremities. Intravenous antibiotic prophylaxis was administered. The patient then underwent successful induction of general anesthesia and endotracheal intubation. The arms were placed abducted on foam-padded cradles and loosely secured with Kerlix. The chest was prepped and draped sterilely. The port scar was infiltrated with 1% lidocaine with epinephrine. It was then excised with a scalpel. The capsule was then entered, and the port was delivered via the incision. A 3-0 Vicryl figure-of-eight suture was placed around the tunnel. The catheter was withdrawn and confirmed to be intact. The suture was then tied down. The capsule was then excised and the wound repaired in a layered fashion with 3-0 Vicryl deep sutures, 3-0 Vicryl deep dermal sutures, and 4-0 Monocryl subcuticular suture. Attention was turned to the breasts. The inframammary fold scars were infiltrated with 1% lidocaine with epinephrine. The procedure began on the right breast.  The scar was excised and sent for permanent pathologic analysis. A superior subcutaneous flap was then elevated sharply. A transverse capsulotomy was then created and an intact tissue expander was removed. The pocket was inspected. There was no periprosthetic fluid noted. There were no visible or palpable nodules noted. Complete incorporation of the acellular dermal matrix was noted. Sizers were placed and attention was turned to the left breast.  In a similar fashion, the left breast scar was excised and sent for permanent pathologic analysis. A superior subcutaneous flap was then elevated sharply. A transverse capsulotomy was then created, and an intact tissue expander was removed. The pocket was inspected. There was no periprosthetic fluid noted. There were no visible or palpable nodules noted.   Complete incorporation of the acellular dermal matrix was noted. Next, the patient was placed in the upright position, and sizers of different volumes and projections were placed. Ultimately, it appeared the style  mL implant gave the best size and shape in accordance with the patient's desires. Both pockets were rinsed with Betadine and then antibiotic irrigation until clear. Hemostasis was checked and noted to be adequate. The surgical sites were isolated with Ioban, and gloves were changed. The implants were brought on the field and immediately bathed in antibiotic irrigation. They were checked for integrity and noted to be intact. The implants were placed in the prepectoral space taking care they were in their proper orientation. The capsules were closed with a running 2-0 Vicryl suture, the deep dermis with 3-0 Vicryl, and the skin with 4-0 Monocryl subcuticular suture. Dermabond and Steri-Strips were placed on the incisions. Fluff gauze and a surgical bra were placed. The patient was awakened, extubated, and taken to the recovery area in stable condition. There were no operative complications. All needle, sponge, and instrument counts were correct at the end of the procedure. Dictated By Светлана Stacy M.D.  d: 12/14/2023 09:38:16  t: 12/14/2023 13:19:26  [facility] [id number]  RTY/

## 2023-12-14 NOTE — DISCHARGE INSTRUCTIONS
Call for fevers, chills, erythema. May shower in 48 hours but no bathing. Leave steristrips in place. Wear sports/surgical bra at all times. No heavy lifting or strenuous activity. Home Care Instructions Following Your Breast Surgery     Kaylan-  We hope you were pleased with your care at [facility].  We wish you the best outcome and overall experience with your operation. These instructions will help to minimize pain, optimize healing, and improve the likelihood of a successful result. What To Expect  There will be some spotting of the incision lines for the next 1-2 weeks. Your breasts will be swollen and might feel congested (similar to breast feeding) for the next 1-2 weeks  Temporary areas of numbness are typical in the early weeks following a breast procedure. Normalization of sensation can typically take up to several months following the operation. Bandages (Dressing)  Keep dressings clean and dry  Do not remove your bra  Reinforce the dressing with insertion of additional pads (pantiliners, incontinence pads) as needed    Bathing/Showers  You can resume showers in 48 hours. No baths, swimming, or hot tubs until you receive medical permission    Pain Medication: Norco  Take one or two tablets every four hours as needed for pain. Do not exceed 8 (eight) tablets each day  Do not take narcotics, if you do not have pain. Keep stools soft. Take a stool softener (Metamucil, Milk of Magnesia, Colace). Eating fruit will also help to prevent constipation    Antibiotics:  Antibiotics will help minimize the risk of a wound infection  Fill the prescription as directed by Dr. Sully Taylor  Follow the instructions as written on the bottle's label  Call Dr. Sully Taylor, if you experience nausea, rash, or other symptoms which might be a possible side effect. Over-The-Counter Medication  Non-prescription anti-inflammatory medications can also help to ease the pain. You can take Aleve or ibuprofen.  Next dose 3:00PM.  Take as directed on the bottle  Drink a full glass of water with the medication    Home Medication  Resume your home medications as instructed  Do not resume herbal medications for two weeks    Diet  Resume your normal diet    Activity  No strenuous activity or heavy lifting  You can go up and down the stairs as tolerated. Use common sense  You cannot return to work, if your work requires strenuous activity. You cannot return to physical exercise, sports, or gym workouts until you are allowed to participate in strenuous activity. Driving  Do not drive, if you are taking pain medication. Return to Work or [facility] can return to work when you are not taking pain medication, if your work does not involve strenuous activity. Contact the office, if you need a medical note. You can return to school in 4-5 days but not sports or gym class for 6 weeks. Follow-up Appointment with Dr. Dalton Cool scheduled your first postoperative visit at the time of your preoperative office visit. Call the office today for an appointment, if you cannot remember the appointment details. Verify your appointment date, day, time, and location. At your 1st postoperative office visit:  Your wounds will be evaluated, healing assessed, and any additional concerns and instructions will be discussed. Questions or Concerns  Call Dr. Dalton Cool if you experience severe pain not controlled by pain medication, swelling, numbness, tingling, bleeding, fever, or other concerns. If he/she is not available, another physician will be able to address your concerns. Kaylan  Thank you for coming to [facility] for your operation. The nurses and the anesthesiologist try very hard to make sure you receive the best care possible. Your trust in them is greatly appreciated.     Thanks so much,  Dr. Dalton Cool

## 2023-12-14 NOTE — ANESTHESIA POSTPROCEDURE EVALUATION
Estephanie Tong 9 Patient Status:  Hospital Outpatient Surgery   Age/Gender 35year old female MRN [id number]   Location [address] [facility] Attending Tello Cao MD   [facility] Day # 0 PCP Adelso Grigsby DO       Anesthesia Post-op Note    Second stage reconstruction with removal of bilateral breast tissue expanders, placement of permanent implants. Removal of the port-a-cath    Procedure Summary       Date: 12/14/23 Room / Location: [facility] MAIN OR  / [facility] MAIN OR    Anesthesia Start: 2165 Anesthesia Stop:     Procedure: Second stage reconstruction with removal of bilateral breast tissue expanders, placement of permanent implants. Removal of the port-a-cath (Bilateral) Diagnosis:       Absence of both breasts      (Absence of both breasts [Z90.13])    Surgeons: Tello Cao MD Anesthesiologist: Quentin Rice MD    Anesthesia Type: general ASA Status: 2            Anesthesia Type: No value filed. Vitals Value Taken Time   /69 12/14/23 0914   Temp 97 12/14/23 0917   Pulse 80 12/14/23 0917   Resp 15 12/14/23 0917   SpO2 95 % 12/14/23 0917   Vitals shown include unfiled device data.     Patient Location: PACU    Anesthesia Type: general    Airway Patency: patent    Postop Pain Control: adequate    Mental Status: mildly sedated but able to meaningfully participate in the post-anesthesia evaluation    Nausea/Vomiting: none    Cardiopulmonary/Hydration status: stable euvolemic    Complications: no apparent anesthesia related complications    Postop vital signs: stable    Dental Exam: Unchanged from Preop

## 2023-12-14 NOTE — BRIEF OP NOTE
Pre-Operative Diagnosis: Absence of both breasts [Z90.13]     Post-Operative Diagnosis: * No post-op diagnosis entered *      Procedure Performed:   Second stage reconstruction with removal of bilateral breast tissue expanders, placement of permanent implants.  Removal of the port-a-cath    Surgeon(s) and Role:     Emily Easley MD - Primary    Assistant(s):  Surgical Assistant.: Anitra Kaiser     Surgical Findings: Nl     Specimen: B/l breast scar     Estimated Blood Loss: Blood Output: 5 mL (12/14/2023  9:02 AM)      Krista Aj MD  12/14/2023  9:05 AM

## 2023-12-14 NOTE — ANESTHESIA PROCEDURE NOTES
Airway  Date/Time: 12/14/2023 7:38 AM  Urgency: elective      General Information and Staff    Patient location during procedure: OR  Anesthesiologist: Mariah Blanca MD  Performed: anesthesiologist   Performed by: Mariah Blanca MD  Authorized by: Mariah Blanca MD      Indications and Patient Condition  Indications for airway management: anesthesia  Sedation level: deep  Preoxygenated: yes  Patient position: sniffing  Mask difficulty assessment: 1 - vent by mask    Final Airway Details  Final airway type: endotracheal airway      Successful airway: ETT  Cuffed: yes   Successful intubation technique: direct laryngoscopy  Endotracheal tube insertion site: oral  Blade: Ramana  Blade size: #4  ETT size (mm): 7.0    Placement verified by: capnometry   Measured from: lips  Number of attempts at approach: 1

## 2023-12-21 ENCOUNTER — OFFICE VISIT (OUTPATIENT)
Dept: SURGERY | Facility: CLINIC | Age: 33
End: 2023-12-21
Payer: COMMERCIAL

## 2023-12-21 DIAGNOSIS — Z90.13 ABSENCE OF BOTH BREASTS: Primary | ICD-10-CM

## 2023-12-21 PROCEDURE — 99024 POSTOP FOLLOW-UP VISIT: CPT

## 2023-12-21 RX ORDER — CLINDAMYCIN HYDROCHLORIDE 300 MG/1
300 CAPSULE ORAL 3 TIMES DAILY
Qty: 21 CAPSULE | Refills: 0 | Status: SHIPPED | OUTPATIENT
Start: 2023-12-21 | End: 2023-12-28

## 2023-12-21 NOTE — PROGRESS NOTES
Kera Marcos is a 35year old female who presents today for a follow-up after removal of bilateral breast tissue expanders, placement of silicone gel implants (bilateral breasts Natrelle Inspira SoftTouch breast implant, 445 mL), removal of indwelling vascular access device with Dr. Kurtis Brown on 12/141/2023. She denies fever and chills. She denies nausea, vomiting, diarrhea or constipation. Her pain is controlled. Patient reports a diffuse erythematous rash on the entirety of her anterior chest and upper abdomen. Reports that this rash appeared 2 days ago. Denies any associated symptoms including fever, chills, body aches. Has taken Benadryl with relief of itching. Physical Exam     Breast: Bilateral breast incisions are clean, dry, intact. There is no evidence of hematoma or seroma bilaterally. Bilateral nipples are viable. Slight duskiness on the entirety of both breast incisions. There is no evidence of open wound, wound drainage, necrosis. Diffuse erythematous rash present on both breasts consistent with the Dermabond allergy. No evidence of open wounds. There is slight erythema on the inferior portion of the left breast.  No evidence of seroma. Right Chest: Port site incision is clean, dry, intact. There is no evidence of hematoma or seroma. The entirety of the incision appears to be slightly dusky with no evidence of open wound, wound drainage, necrosis. There is an erythematous rash on the right chest consistent with a Dermabond allergy. No evidence of open wound. There were no vitals filed for this visit. Assessment and Plan     Kera Marcos is doing well s/p  removal of bilateral breast tissue expanders, placement of silicone gel implants (bilateral breasts Natrelle Inspira SoftTouch breast implant, 445 mL), removal of indwelling vascular access device with Dr. Kurtis Brown on 12/141/2023.     The bilateral breast incisions and right chest port removal incision was redressed with Neosporin, Xeroform, Telfa, tape. The patient will change this dressing daily and use mupirocin ointment instead of Neosporin. Dressing care and instructions were reviewed with the patient and supplies were given. The patient will continue use of Benadryl topical cream or Aquaphor for erythematous rash. She was instructed to contact the office if the rash does not improve or worsen. She will start a new dose of oral antibiotics. Compression and activity guidelines were reviewed with the patient. She was encouraged to contact the office with any questions or concerns. Questions were answered. Patient understands.      Violet Starks, JOEL  12/21/2023  11:43 AM

## 2024-01-19 ENCOUNTER — OFFICE VISIT (OUTPATIENT)
Dept: SURGERY | Facility: CLINIC | Age: 34
End: 2024-01-19
Payer: COMMERCIAL

## 2024-01-19 DIAGNOSIS — Z90.13 ABSENCE OF BOTH BREASTS: Primary | ICD-10-CM

## 2024-01-19 PROCEDURE — 99024 POSTOP FOLLOW-UP VISIT: CPT | Performed by: SURGERY

## 2024-01-19 NOTE — PROGRESS NOTES
Kaylan Mcclelland is a 33 year old female who presents today for a follow-up.  She reports a rash postoperatively which is improved.  She reports breast asymmetry.      Physical Examination:  The right upper chest wall incision is clean dry and intact.  Breasts: Bilateral breast incisions are clean dry and intact.  Excellent shape and symmetry is noted.    Assessment and Plan:  Patient is doing well.  The patient was reassured that her appearance is within expected normal limits at this time.  We discussed scar care including massage and moisturizer and silicone products.  The patient may resume regular activity as tolerated.  She will follow-up in 6 months for scar check.  The plan was reviewed with the patient and questions were answered.

## 2024-01-29 ENCOUNTER — MED REC SCAN ONLY (OUTPATIENT)
Dept: SURGERY | Facility: CLINIC | Age: 34
End: 2024-01-29

## 2024-02-22 ENCOUNTER — APPOINTMENT (OUTPATIENT)
Dept: UROGYNECOLOGY | Age: 34
End: 2024-02-22

## 2024-02-27 ENCOUNTER — PATIENT MESSAGE (OUTPATIENT)
Dept: INTERNAL MEDICINE CLINIC | Facility: CLINIC | Age: 34
End: 2024-02-27

## 2024-02-27 DIAGNOSIS — Z90.13 ABSENCE OF BOTH BREASTS: Primary | ICD-10-CM

## 2024-02-27 NOTE — TELEPHONE ENCOUNTER
From: Kaylan Mcclelland  To: Negin Miramontes  Sent: 2/27/2024 1:18 PM CST  Subject: Referral    Hi, I'm trying to see a gynecologic oncologist w/ a different doctor instead of one I go to at [place]. The doctor's name of Aaron Robles at [facility]. The office says I need a referral even though I have a PPO. The fax number is . Thank you.

## 2024-05-29 RX ORDER — TAMOXIFEN CITRATE 20 MG/1
20 TABLET ORAL DAILY
Qty: 30 TABLET | Refills: 0 | OUTPATIENT
Start: 2024-05-29

## 2024-05-29 RX ORDER — TAMOXIFEN CITRATE 20 MG/1
20 TABLET ORAL DAILY
Qty: 90 TABLET | Refills: 0 | Status: SHIPPED | OUTPATIENT
Start: 2024-05-29 | End: 2024-05-29

## 2024-06-26 ENCOUNTER — APPOINTMENT (OUTPATIENT)
Dept: HEMATOLOGY/ONCOLOGY | Age: 34
End: 2024-06-26
Attending: SPECIALIST
Payer: COMMERCIAL

## 2024-07-01 RX ORDER — TAMOXIFEN CITRATE 20 MG/1
20 TABLET ORAL DAILY
Qty: 30 TABLET | Refills: 0 | OUTPATIENT
Start: 2024-07-01

## 2024-07-05 RX ORDER — TAMOXIFEN CITRATE 20 MG/1
20 TABLET ORAL DAILY
Qty: 30 TABLET | Refills: 0 | Status: SHIPPED | OUTPATIENT
Start: 2024-07-05 | End: 2024-08-08

## 2024-07-05 RX ORDER — TAMOXIFEN CITRATE 20 MG/1
20 TABLET ORAL DAILY
Qty: 30 TABLET | Refills: 0 | OUTPATIENT
Start: 2024-07-05

## 2024-07-31 ENCOUNTER — APPOINTMENT (OUTPATIENT)
Dept: HEMATOLOGY/ONCOLOGY | Age: 34
End: 2024-07-31
Attending: SPECIALIST
Payer: COMMERCIAL

## 2024-08-08 ENCOUNTER — OFFICE VISIT (OUTPATIENT)
Dept: HEMATOLOGY/ONCOLOGY | Facility: HOSPITAL | Age: 34
End: 2024-08-08
Attending: SPECIALIST
Payer: COMMERCIAL

## 2024-08-08 VITALS
WEIGHT: 154.81 LBS | DIASTOLIC BLOOD PRESSURE: 91 MMHG | RESPIRATION RATE: 16 BRPM | HEIGHT: 65.98 IN | SYSTOLIC BLOOD PRESSURE: 136 MMHG | OXYGEN SATURATION: 99 % | HEART RATE: 77 BPM | BODY MASS INDEX: 24.88 KG/M2 | TEMPERATURE: 98 F

## 2024-08-08 DIAGNOSIS — Z15.01 BREAST CANCER, BRCA2 POSITIVE, LEFT (HCC): ICD-10-CM

## 2024-08-08 DIAGNOSIS — Z15.01 BRCA2 GENE MUTATION POSITIVE IN FEMALE: Primary | ICD-10-CM

## 2024-08-08 DIAGNOSIS — Z15.09 BRCA2 GENE MUTATION POSITIVE IN FEMALE: Primary | ICD-10-CM

## 2024-08-08 DIAGNOSIS — Z91.89 HIGH RISK OF OVARIAN CANCER: ICD-10-CM

## 2024-08-08 DIAGNOSIS — C50.912 BREAST CANCER, BRCA2 POSITIVE, LEFT (HCC): ICD-10-CM

## 2024-08-08 DIAGNOSIS — Z79.810 LONG-TERM CURRENT USE OF TAMOXIFEN: ICD-10-CM

## 2024-08-08 DIAGNOSIS — Z15.02 BRCA2 GENE MUTATION POSITIVE IN FEMALE: Primary | ICD-10-CM

## 2024-08-08 PROCEDURE — 99211 OFF/OP EST MAY X REQ PHY/QHP: CPT

## 2024-08-08 RX ORDER — TAMOXIFEN CITRATE 20 MG/1
20 TABLET ORAL DAILY
Qty: 90 TABLET | Refills: 1 | Status: SHIPPED | OUTPATIENT
Start: 2024-08-08

## 2024-08-08 RX ORDER — DOXYCYCLINE HYCLATE 100 MG/1
CAPSULE ORAL
COMMUNITY
Start: 2024-08-06

## 2024-08-08 NOTE — PROGRESS NOTES
[facility] Progress Note      Patient Name: Kaylan Mcclelland   YOB: 1990  Medical Record Number: [id number]  Attending Physician: David Elizondo M.D.     The 21st Century Cures Act makes medical notes like these available to patients in the interest of transparency. Please be advised this is a medical document. Medical documents are intended to carry relevant information, facts as evident, and the clinical opinion of the practitioner. The medical note is intended as peer to peer communication and may appear blunt or direct. It is written in medical language and may contain abbreviations or verbiage that are unfamiliar.     Date of Visit: 8/8/2024       Chief Complaint  Invasive ductal carcinoma, left breast - follow up.    Oncologic History  Kaylan Mcclelland is a 34 year old female who on 08/23/2021 underwent bilateral mammography and ultrasound for a palpable left breast mass which showed a 5 cm mass in the left breast at 6 o'clock, several enlarged/prominent left axillary lymph nodes at least one of which showed cortical thickening and several right breast cysts. On 09/02/2021 she underwent ultrasound guided biopsy of the left breast mass which showed a grade 3 invasive ductal carcinoma; immunohistochemical studies showed the following: estrogen receptor 50% positive (weak to moderate), progesterone receptor 50% positive (strong), Her2 3+ (positive), and Ki67 75%.     CT chest, abdomen, pelvis w contrast on 09/14/2021 showed the left breast mass, a small 6 x 3 mm pulmonary nodule along the major fissure on the left likely representing an intramammary lymph node, a 7.7 cm pelvic mass abutting the posterior uterus, a cyst within the left ovary, and a 1.8 cm sclerotic lesions involving the proximal left femur.     Pelvic ultrasound on 09/15/2021 showed a 5.2 x 7.1 x 7.3 cm pelvic mass likely representing a pedunculated uterine fibroid and 2.2 x 2.8 x 3.2 simple left ovarian cyst  felt to be benign.     NM bone scan on 09/15/2021 showed uptake in the proximal left femur. MRI of the pelvis w/wo contrast does show a corresponding abnormality. The MRI was reviewed with radiology at [facility] and the consensus was that the finding likely represented a benign lesion.     MUGA scan on 09/16/2021 showed a normal LVEF of 62%.     MRI breasts on 09/16/2021 showed the following in the left breast: a 4.8 cm mass at the 6 o'clock position with suspicious enhancement involving the skin along the inferior breast but not the chest wall, a 0.9 cm enhancing mass with enhancement at the 12 o'clock position, a 1.1 cm enhancing mass at the 6 o'clock position, a 0.7 cm indeterminate mass at the 8 o'clock position, and multiple normal sized lymph nodes with borderline thickened cortex. The following was seen in the right breast: a 0.8 cm area of indeterminate enhancement at the 12 o'clock position, a second 0.6 cm area of benign enhancement, and normal appearing axillary lymph nodes.     Genetic testing with [employer]'s My Risk panel showed that she is a carrier of the deleterious BRCA2 mutation c.1654del ([id number]).    She was staged clinically as T2N0M0. On 09/17/2021 she began neoadjuvant therapy with TCHP at [facility].    Patient was premenopausal at diagnosis. She elected against egg harvesting for fertility preservation.     On 09/16/2021 patient began therapy with TCHP at an outside institution. She received her last dose on 01/10/2022 and then continued trastuzumab and pertuzumab on 02/03/2022.     On 02/17/2022 she underwent bilateral mastectomy, left sentinel lymph node biopsy, and tissue expander reconstruction. Pathology showed no residual malignancy in the left breast, no malignancy in the right breast, and 2 lymph nodes negative for malignancy (0/2).    On 12/14/2023, she underwent breast reconstruction with silicone breast implants.     History of Present Illness  Patient returns for follow  up after last being seen in 11/2023 and failing to follow up as recommended.     Patient was to undergo myomectomy but cancelled. She did have a pelvic ultrasound in 04/2024 at an outside institution which showed no concerning ovarian findings. She indicates to me today that she does not plan to follow up with gynecology.     She states that she has been compliant with tamoxifen. She denies any significant adverse effects. She denies any self palpated masses. She denies any abdominal bloating or persistent constipation.     Performance Status   Karnofsky 100% - Normal, no complaints.    Past Medical History (historical data, reviewed by physician)  Invasive ductal carcinoma, left breast (as above).     Past Surgical History (historical data, reviewed by physician)  None.    Family History (historical data, reviewed by physician)  Maternal aunt with breast cancer age 20s; first cousin (daughter of the aunt with breast cancer) with ovarian cancer age 16; maternal grandmother with ?colorectal cancer. Mother living with no history of cancer. Father living with no history of cancer.     Social History (historical data, reviewed by physician)  Denies tobacco use.     Current Medications   doxycycline 100 MG Oral Cap TAKE 1 CAPSULE BY MOUTH EVERY DAY WITH FOOD AND A FULL GLASS OF WATER. AVOID LYING DOWN FOR 2-3 HOURS AFTER TAKING MEDICATION      tamoxifen 20 MG Oral Tab Take 1 tablet (20 mg total) by mouth daily. 90 tablet 1     Allergies   Ms. Mcclelland is allergic to dermabond, nitrofurantoin, and sulfamethoxazole w/trimethoprim.     Vital Signs   Height: 167.6 cm (5' 5.98\") (08/08 1136)  Weight: 70.2 kg (154 lb 12.8 oz) (08/08 1136)  BSA (Calculated - sq m): 1.79 sq meters (08/08 1136)  Pulse: 77 (08/08 1136)  BP: 136/91 (08/08 1136)  Temp: 98.2 °F (36.8 °C) (08/08 1136)  Do Not Use - Resp Rate: --  SpO2: 99 % (08/08 1136)    Physical Examination   Constitutional      Well developed, well nourished. Appears close to  chronological age. No apparent distress.   Head   Normocephalic and atraumatic.  Eyes   Conjunctiva clear; sclera anicteric.  ENMT                 External nose normal; external ears normal.  Neck   Supple; no masses.   Lymphatics  No cervical, supraclavicular, axillary adenopathy.   Breasts   Patient declines exam.  Respiratory          Normal effort; no respiratory distress; clear to auscultation bilaterally.   Cardiovascular  Regular rate and rhythm; normal S1S2.  Abdomen  Soft; not tender; no masses; no hepatosplenomegaly.   Extremities  No lower extremity edema.   Neurologic           Motor and sensory grossly intact.  Psychiatric          Mood and affect appropriate.    Laboratory   No results found for this or any previous visit (from the past 24 hour(s)).    Radiology  Gynecological Report                    (Signed Final 2024 09:38 am)   ----------------------------------------------------------------------   PATIENT INFO:      ID #:       [id number]                       :  [birth year] (34 yrs)(F)    Name:       SKYLAR LAWRENCE                 Visit Date: 2024 07:29 am   ----------------------------------------------------------------------   PERFORMED BY:      Performed By:     Cassie Gomez    Ref. Address:     [address] [place] [address]                                                                                                                              [place] [address]    Attending:        Yolanda Gamez MD        Location:         [place] [address]    Referred By:      Fallon Vieira MD   ----------------------------------------------------------------------   SERVICE(S) PROVIDED:      Transvaginal, GYN  [[id number]]                         [id number]   ----------------------------------------------------------------------   INDICATIONS:      Benign neoplasm of connective and other        D21.9    soft tissue, unspecified    ----------------------------------------------------------------------   TECHNIQUE/SCAN QUALITY:      Technique:      Transvaginal ultrasound was performed to                    evaluate pelvic structures.                    Empty bladder prohibited ability to                    visualize pelvic structures on                    transabdominal ultrasound.   ----------------------------------------------------------------------   COMPARISON:      No prior gynecologic ultrasound images are available for    comparison.   ----------------------------------------------------------------------   HISTORY:      Age:   34      Menses:                Amenorrhea   ----------------------------------------------------------------------   UTERUS:      Uterus:     Visualized    Position:   Midplane    Cervix Length:   3.06  cm    Size (cm)    L:  7.78      W:   4.94       H:  4.82    Vol (ml):       58.85  (Cervix excluded)   ----------------------------------------------------------------------   MYOMAS:      Site                     L(cm)      W(cm)      D(cm)       Location    Right lateral            3.76       3.34       4.1         Exophytic    Right/midline            6.78       5.86       4.85        Exophytic   ----------------------------------------------------------------------    Blood Flow                  RI       PI       Comments    Blood Flow                  RI       PI       Comments     ----------------------------------------------------------------------   ENDOMETRIUM:      Endometrium:            Visualized    Thickness(mm):          9.4     ----------------------------------------------------------------------   CERVIX:      Normal appearance.   ----------------------------------------------------------------------   CUL-DE-SAC:      A small amount of fluid was noted.   ----------------------------------------------------------------------   RIGHT OVARY:      Status:   Visualized    Size (cm)    L:  3.69       W:   3.3        H:  3.13     Vol.(ml):  19.96    Morphology:    Cystic interface    Type:   Cyst with internal echoes    Size (cm)    L:  1.84      W:   1.7        H:  1.42     Vol.(ml):  2.33      Comment:    Color Doppler is unremarkable.   ----------------------------------------------------------------------   LEFT OVARY:      Status:   Visualized    Size (cm)    L:  3.93      W:   3.78       H:  2.43     Vol.(ml):  18.9    Morphology:    Cystic interface    Type:   Cyst with internal echoes    Size (cm)    L:  2.54      W:   2.32       H:  2.46     Vol.(ml):  7.59    Comment:    Color Doppler demonstrates peripheral blood flow.     IMPRESSION:      Myomas as described above.    Mildly enlarged ovaries with cysts as described above;    appearance is most suggestive of hemorrhagic cysts or    endometriomas.   ---------------------------------------------------------------------- ----------------------------------------------------------------------     Thank you for allowing us to participate in the care of   MELINDA CHENG. Please do not hesitate to call us if you   have any questions.     This ultrasound report with graphs is available for review in     the [facility] copy of the AS     Software database                       Yolanda Gamez MD   Electronically Signed Final Report 04/29/2024 09:38 AM     Impression and Plan   1.   Invasive ductal carcinoma, left breast: Patient is staged as T2N0M0. Patient's tumor was estrogen and progesterone receptor positive and Her2 positive. She received neoadjuvant TCHP beginning on 09/16/2021. She underwent bilateral mastectomy and left sentinel lymph node biopsy on 02/17/2022. No residual malignancy was found. She continued adjuvant trastuzumab and pertuzumab with last dose on 09/21/2022. She began adjuvant tamoxifen in 04/2022 but has not been consistently compliant with the medication or follow up.          Continue tamoxifen without  modification.     2.   BRCA2 mutation carrier: Patient states that she is no longer planning to follow up with gynecology. Pelvic ultrasound for 10/2024 provided to patient.     Patient will continue to receive longitudinal care by me for the complex care required for the cancer diagnosis including the expected complications related to anticancer therapy.    Planned Follow Up   Patient will return for follow up in 6 months.    Electronically signed by:    David Elizondo M.D.  [facility] Medical Director of Oncology Services  [facility]

## 2024-08-08 NOTE — PROGRESS NOTES
Patient is here for MD f/u for breast cancer. Patient is on Tamoxifen daily. Mild constipation but otherwise tolerating well. Bilateral breast reconstruction completed in December. Feeling well.     Education Record    Learner:  Patient    Disease / Diagnosis:  Breast cancer     Barriers / Limitations:  None   Comments:    Method:  Discussion   Comments:    General Topics:  Plan of care reviewed   Comments:    Outcome:  Shows understanding   Comments:

## 2024-09-03 ENCOUNTER — OFFICE VISIT (OUTPATIENT)
Dept: SURGERY | Facility: CLINIC | Age: 34
End: 2024-09-03
Payer: COMMERCIAL

## 2024-09-03 DIAGNOSIS — Z90.13 ABSENCE OF BOTH BREASTS: Primary | ICD-10-CM

## 2024-09-03 PROCEDURE — 99212 OFFICE O/P EST SF 10 MIN: CPT | Performed by: SURGERY

## 2024-09-03 NOTE — PROGRESS NOTES
Kaylan Mcclelland is a 34 year old female who presents today in follow-up after undergoing exchange to [facility] 445 cc implants in December.  She has questions regarding fat grafting.    Physical Examination:  Breasts:Bilateral breast incisions are clean dry and intact.  There is no erythema or seroma noted.  Excellent shape and symmetry is noted.    Assessment and Plan:  Patient is doing well.  I have advised against fat grafting as she has excellent shape and contour at this time  We reviewed the recommended FDA surveillance protocol for silicone implants.  We discussed continuing regular self breast examination with a plan for follow-up in 1 year or sooner if any changes are detected.  The plan was reviewed with the patient and questions were answered.       Patient walked 500 feet on 2L NC. SpO2 88-94%.

## 2024-10-07 ENCOUNTER — TELEPHONE (OUTPATIENT)
Dept: INTERNAL MEDICINE CLINIC | Facility: CLINIC | Age: 34
End: 2024-10-07

## 2024-10-07 DIAGNOSIS — Z00.00 ROUTINE GENERAL MEDICAL EXAMINATION AT A HEALTH CARE FACILITY: Primary | ICD-10-CM

## 2024-10-07 NOTE — TELEPHONE ENCOUNTER
Orders to  [facility]      Pt aware to get labs done no sooner than 2 weeks prior to the appt.  Pt aware to fast.  No call back required.    Future Appointments   Date Time Provider Department Center   10/30/2024 11:00 AM Negin Miramontes DO [facility]

## 2024-10-24 ENCOUNTER — HOSPITAL ENCOUNTER (OUTPATIENT)
Dept: ULTRASOUND IMAGING | Age: 34
Discharge: HOME | End: 2024-10-24
Attending: SPECIALIST
Payer: COMMERCIAL

## 2024-10-24 DIAGNOSIS — Z15.02 BRCA2 GENE MUTATION POSITIVE IN FEMALE: ICD-10-CM

## 2024-10-24 DIAGNOSIS — Z15.01 BRCA2 GENE MUTATION POSITIVE IN FEMALE: ICD-10-CM

## 2024-10-24 DIAGNOSIS — Z15.09 BRCA2 GENE MUTATION POSITIVE IN FEMALE: ICD-10-CM

## 2024-10-24 DIAGNOSIS — Z91.89 HIGH RISK OF OVARIAN CANCER: ICD-10-CM

## 2024-10-24 PROCEDURE — 76830 TRANSVAGINAL US NON-OB: CPT | Performed by: SPECIALIST

## 2024-10-24 PROCEDURE — 76856 US EXAM PELVIC COMPLETE: CPT | Performed by: SPECIALIST

## 2024-10-25 ENCOUNTER — TELEPHONE (OUTPATIENT)
Dept: HEMATOLOGY/ONCOLOGY | Facility: HOSPITAL | Age: 34
End: 2024-10-25

## 2024-10-25 ENCOUNTER — LAB ENCOUNTER (OUTPATIENT)
Dept: LAB | Age: 34
End: 2024-10-25
Attending: FAMILY MEDICINE
Payer: COMMERCIAL

## 2024-10-25 DIAGNOSIS — Z00.00 ROUTINE GENERAL MEDICAL EXAMINATION AT A HEALTH CARE FACILITY: ICD-10-CM

## 2024-10-25 LAB
ALBUMIN SERPL-MCNC: 4.4 G/DL (ref 3.2–4.8)
ALBUMIN/GLOB SERPL: 1.7 {RATIO} (ref 1–2)
ALP LIVER SERPL-CCNC: 47 U/L
ALT SERPL-CCNC: 9 U/L
ANION GAP SERPL CALC-SCNC: 6 MMOL/L (ref 0–18)
AST SERPL-CCNC: 16 U/L (ref ?–34)
BASOPHILS # BLD AUTO: 0.04 X10(3) UL (ref 0–0.2)
BASOPHILS NFR BLD AUTO: 0.9 %
BILIRUB SERPL-MCNC: 1 MG/DL (ref 0.3–1.2)
BUN BLD-MCNC: 14 MG/DL (ref 9–23)
CALCIUM BLD-MCNC: 9.4 MG/DL (ref 8.7–10.4)
CHLORIDE SERPL-SCNC: 108 MMOL/L (ref 98–112)
CHOLEST SERPL-MCNC: 149 MG/DL (ref ?–200)
CO2 SERPL-SCNC: 25 MMOL/L (ref 21–32)
CREAT BLD-MCNC: 0.93 MG/DL
EGFRCR SERPLBLD CKD-EPI 2021: 83 ML/MIN/1.73M2 (ref 60–?)
EOSINOPHIL # BLD AUTO: 0.05 X10(3) UL (ref 0–0.7)
EOSINOPHIL NFR BLD AUTO: 1.1 %
ERYTHROCYTE [DISTWIDTH] IN BLOOD BY AUTOMATED COUNT: 13.1 %
FASTING PATIENT LIPID ANSWER: NO
FASTING STATUS PATIENT QL REPORTED: NO
GLOBULIN PLAS-MCNC: 2.6 G/DL (ref 2–3.5)
GLUCOSE BLD-MCNC: 96 MG/DL (ref 70–99)
HCT VFR BLD AUTO: 38.2 %
HDLC SERPL-MCNC: 49 MG/DL (ref 40–59)
HGB BLD-MCNC: 12.6 G/DL
IMM GRANULOCYTES # BLD AUTO: 0 X10(3) UL (ref 0–1)
IMM GRANULOCYTES NFR BLD: 0 %
LDLC SERPL CALC-MCNC: 90 MG/DL (ref ?–100)
LYMPHOCYTES # BLD AUTO: 2 X10(3) UL (ref 1–4)
LYMPHOCYTES NFR BLD AUTO: 45.6 %
MCH RBC QN AUTO: 29.1 PG (ref 26–34)
MCHC RBC AUTO-ENTMCNC: 33 G/DL (ref 31–37)
MCV RBC AUTO: 88.2 FL
MONOCYTES # BLD AUTO: 0.42 X10(3) UL (ref 0.1–1)
MONOCYTES NFR BLD AUTO: 9.6 %
NEUTROPHILS # BLD AUTO: 1.88 X10 (3) UL (ref 1.5–7.7)
NEUTROPHILS # BLD AUTO: 1.88 X10(3) UL (ref 1.5–7.7)
NEUTROPHILS NFR BLD AUTO: 42.8 %
NONHDLC SERPL-MCNC: 100 MG/DL (ref ?–130)
OSMOLALITY SERPL CALC.SUM OF ELEC: 288 MOSM/KG (ref 275–295)
PLATELET # BLD AUTO: 223 10(3)UL (ref 150–450)
POTASSIUM SERPL-SCNC: 3.8 MMOL/L (ref 3.5–5.1)
PROT SERPL-MCNC: 7 G/DL (ref 5.7–8.2)
RBC # BLD AUTO: 4.33 X10(6)UL
SODIUM SERPL-SCNC: 139 MMOL/L (ref 136–145)
TRIGL SERPL-MCNC: 45 MG/DL (ref 30–149)
VLDLC SERPL CALC-MCNC: 7 MG/DL (ref 0–30)
WBC # BLD AUTO: 4.4 X10(3) UL (ref 4–11)

## 2024-10-25 PROCEDURE — 80053 COMPREHEN METABOLIC PANEL: CPT

## 2024-10-25 PROCEDURE — 80061 LIPID PANEL: CPT

## 2024-10-25 PROCEDURE — 85025 COMPLETE CBC W/AUTO DIFF WBC: CPT

## 2024-10-30 ENCOUNTER — OFFICE VISIT (OUTPATIENT)
Dept: INTERNAL MEDICINE CLINIC | Facility: CLINIC | Age: 34
End: 2024-10-30
Payer: COMMERCIAL

## 2024-10-30 ENCOUNTER — TELEPHONE (OUTPATIENT)
Dept: INTERNAL MEDICINE CLINIC | Facility: CLINIC | Age: 34
End: 2024-10-30

## 2024-10-30 VITALS
WEIGHT: 148.81 LBS | BODY MASS INDEX: 24 KG/M2 | DIASTOLIC BLOOD PRESSURE: 64 MMHG | OXYGEN SATURATION: 99 % | HEART RATE: 67 BPM | SYSTOLIC BLOOD PRESSURE: 120 MMHG

## 2024-10-30 DIAGNOSIS — K59.00 CONSTIPATION, UNSPECIFIED CONSTIPATION TYPE: ICD-10-CM

## 2024-10-30 DIAGNOSIS — Z00.00 ANNUAL PHYSICAL EXAM: Primary | ICD-10-CM

## 2024-10-30 DIAGNOSIS — R53.83 FATIGUE, UNSPECIFIED TYPE: ICD-10-CM

## 2024-10-30 PROCEDURE — 90651 9VHPV VACCINE 2/3 DOSE IM: CPT | Performed by: FAMILY MEDICINE

## 2024-10-30 PROCEDURE — 3074F SYST BP LT 130 MM HG: CPT | Performed by: FAMILY MEDICINE

## 2024-10-30 PROCEDURE — 99395 PREV VISIT EST AGE 18-39: CPT | Performed by: FAMILY MEDICINE

## 2024-10-30 PROCEDURE — 90471 IMMUNIZATION ADMIN: CPT | Performed by: FAMILY MEDICINE

## 2024-10-30 PROCEDURE — 3078F DIAST BP <80 MM HG: CPT | Performed by: FAMILY MEDICINE

## 2024-10-30 NOTE — PROGRESS NOTES
Subjective:   Patient ID: Kaylan Mcclelland is a 34 year old female.    HPI Here for annual check-up. Patient has continued to see her specialists for her breast cancer diagnosis. Has been eating healthy and has lost weight. Often wakes in the middle of the night and has a hard time falling back to sleep and so feels fatigued during the day. Struggles with constipation and sometimes needs to take a laxative. Would like to see GI.       History/Other:   Past Medical History:    Anesthesia complication    Back problem    Breast cancer (HCC)    Cancer (HCC)    breast    Personal history of antineoplastic chemotherapy    last dose 1/2022    PONV (postoperative nausea and vomiting)     Past Surgical History:   Procedure Laterality Date    Breast reconstruction Bilateral 12/2023    Hc chemo admin peritoneal cavity via port or cath  2021    Mastectomy left  2022    Mastectomy right  2022    Port, indwelling, imp      [place] teeth removed  2010    no anesthesia     Social History     Socioeconomic History    Marital status: Single   Tobacco Use    Smoking status: Never    Smokeless tobacco: Never   Vaping Use    Vaping status: Never Used   Substance and Sexual Activity    Alcohol use: Never    Drug use: Never    Sexual activity: Not Currently   Other Topics Concern    Caffeine Concern Yes    Exercise No    Seat Belt Yes     Social Drivers of Health      Received from [facility]     Family History   Problem Relation Age of Onset    Cancer Maternal Aunt 29        Breast    Cancer Cousin 15       Review of Systems   Constitutional:  Positive for fatigue. Negative for activity change, appetite change and fever.   HENT:  Negative for ear pain, hearing loss and rhinorrhea.    Eyes:  Negative for visual disturbance.   Respiratory:  Negative for cough and shortness of breath.    Cardiovascular:  Negative for chest pain, palpitations and leg swelling.   Gastrointestinal:  Positive for constipation.  Negative for abdominal pain.   Endocrine: Negative for polydipsia, polyphagia and polyuria.   Genitourinary:  Negative for dysuria and frequency.   Musculoskeletal:  Negative for arthralgias and joint swelling.   Skin:  Negative for rash.   Neurological:  Negative for dizziness, weakness, numbness and headaches.   Hematological:  Negative for adenopathy. Does not bruise/bleed easily.   Psychiatric/Behavioral:  Negative for dysphoric mood. The patient is not nervous/anxious.      Current Outpatient Medications   Medication Sig Dispense Refill    tamoxifen 20 MG Oral Tab Take 1 tablet (20 mg total) by mouth daily. 90 tablet 1     Allergies:Allergies[1]    Objective:   Physical Exam  Vitals reviewed.   Constitutional:       Appearance: Normal appearance. She is well-developed.   HENT:      Head: Normocephalic and atraumatic.      Right Ear: Tympanic membrane, ear canal and external ear normal.      Left Ear: Tympanic membrane, ear canal and external ear normal.      Mouth/Throat:      Pharynx: No posterior oropharyngeal erythema.   Eyes:      Conjunctiva/sclera: Conjunctivae normal.      Pupils: Pupils are equal, round, and reactive to light.   Cardiovascular:      Rate and Rhythm: Normal rate and regular rhythm.      Heart sounds: Normal heart sounds.   Pulmonary:      Effort: Pulmonary effort is normal.      Breath sounds: Normal breath sounds.   Skin:     General: Skin is warm and dry.   Neurological:      Mental Status: She is alert.   Psychiatric:         Behavior: Behavior normal.         Assessment & Plan:   1. Annual physical exam    2. Constipation, unspecified constipation type    3. Fatigue, unspecified type    Reviewed age-appropriate preventive health and safety recommendations with patient. Reviewed lab results. Encouraged regular exercise and healthy eating.   GI referral per patient request.   Check labs.     Orders Placed This Encounter   Procedures    Iron And Tibc    Ferritin    Vitamin B12 [E]     Folic Acid Serum [E]    Vitamin D [E]    TSH W Reflex To Free T4 [E]    HPV (Gardasil 9)       Meds This Visit:  Requested Prescriptions      No prescriptions requested or ordered in this encounter       Imaging & Referrals:  HPV HUMAN PAPILLOMA VIRUS VACC 9 RAMONA 3 DOSE IM  GASTRO - INTERNAL         [1]   Allergies  Allergen Reactions    Dermabond HIVES    Nitrofurantoin OTHER (SEE COMMENTS) and NAUSEA AND VOMITING     Vomiting after taking  It    Sulfamethoxazole W/Trimethoprim OTHER (SEE COMMENTS) and NAUSEA AND VOMITING     Swelling, drowsiness,

## 2024-10-30 NOTE — TELEPHONE ENCOUNTER
Future Appointments   Date Time Provider Department Center   12/31/2024  8:30 AM [facility] N[facility]SE [facility]   2/6/2025 11:30 AM David Elizondo MD  [facility]   9/5/2025 10:45 AM Grant Perez MD [id number]     Patient coming for #2 HPV

## 2024-12-31 ENCOUNTER — NURSE ONLY (OUTPATIENT)
Dept: INTERNAL MEDICINE CLINIC | Facility: CLINIC | Age: 34
End: 2024-12-31
Payer: COMMERCIAL

## 2024-12-31 PROCEDURE — 90651 9VHPV VACCINE 2/3 DOSE IM: CPT | Performed by: FAMILY MEDICINE

## 2024-12-31 PROCEDURE — 90471 IMMUNIZATION ADMIN: CPT | Performed by: FAMILY MEDICINE

## 2025-02-06 ENCOUNTER — APPOINTMENT (OUTPATIENT)
Age: 35
End: 2025-02-06
Attending: SPECIALIST
Payer: COMMERCIAL

## 2025-02-07 ENCOUNTER — TELEPHONE (OUTPATIENT)
Dept: INTERNAL MEDICINE CLINIC | Facility: CLINIC | Age: 35
End: 2025-02-07

## 2025-02-07 DIAGNOSIS — Z15.09 BRCA2 GENE MUTATION POSITIVE: ICD-10-CM

## 2025-02-07 DIAGNOSIS — Z90.13 ABSENCE OF BOTH BREASTS: ICD-10-CM

## 2025-02-07 DIAGNOSIS — Z17.0 MALIGNANT NEOPLASM OF OVERLAPPING SITES OF LEFT BREAST IN FEMALE, ESTROGEN RECEPTOR POSITIVE (HCC): Primary | ICD-10-CM

## 2025-02-07 DIAGNOSIS — C50.812 MALIGNANT NEOPLASM OF OVERLAPPING SITES OF LEFT BREAST IN FEMALE, ESTROGEN RECEPTOR POSITIVE (HCC): Primary | ICD-10-CM

## 2025-02-07 DIAGNOSIS — Z15.01 BRCA2 GENE MUTATION POSITIVE: ICD-10-CM

## 2025-02-07 NOTE — TELEPHONE ENCOUNTER
First 10/30/24, second 12/31/24.     Dose 3 needs to be 6 mos from the first dose. Please reschedule.

## 2025-02-07 NOTE — TELEPHONE ENCOUNTER
Patient is scheduled on below for an appt and wants to get to her 3rd HPV shot.    Future Appointments   Date Time Provider Department Center   2/14/2025  8:45 AM Negin Miramontes DO [facility]

## 2025-02-07 NOTE — TELEPHONE ENCOUNTER
LOV 10/30/24     Future Appointments   Date Time Provider Department Center   2/12/2025  1:45 PM David Elizondo MD PF [facility]   2/14/2025  8:45 AM Negin Miramontes DO [facility]   4/2/2025  8:30 AM Mau, JOEL [facility] GI   9/5/2025 10:45 AM Grant Perez MD [id number]       AMS- Pended referral, ok to sign once insurance information entered?     - No active insurance on file. Please obtain and enter

## 2025-02-07 NOTE — TELEPHONE ENCOUNTER
Kaylan Mcclelland called requesting a referral  LOV: 10/30/2024    Patient stated she needs a new referral as she has new insurance.  Patient stated this doctor takes her new insurance and is due for a follow up    Patient requesting referral to Oncology - Dr. David Elizondo, through [facility] [employer]    Is this referral for a new concern or existing concern: no  Has the patient been seen in our office for this concern? Yes, Dr. Negin Miramontes is aware  If patient is requesting a new referral for a new concern - please offer appointment with PCP first. Did patient accept an appointment:     Specialty: Oncology  Practice address:   Practice phone number: [phone]  Does patient have a scheduled appointment already with the specialist?: appt is this Wednesday, Feb 12th

## 2025-02-07 NOTE — TELEPHONE ENCOUNTER
Patient aware too early for 3rd HPV shot and will reschedule this shot as it comes closer.    Patient still keeping her appt as she had something else to address.     Future Appointments   Date Time Provider Department Center   2/14/2025  8:45 AM Negin Miramontes DO [facility]

## 2025-02-10 NOTE — PROGRESS NOTES
[facility] Hematology Oncology Group Progress Note       Patient Name: Kaylan Mcclelland   YOB: 1990  Medical Record Number: [id number]  Attending Physician: David Elizondo M.D.     The 21st Century Cures Act makes medical notes like these available to patients in the interest of transparency. Please be advised this is a medical document. Medical documents are intended to carry relevant information, facts as evident, and the clinical opinion of the practitioner. The medical note is intended as peer to peer communication and may appear blunt or direct. It is written in medical language and may contain abbreviations or verbiage that are unfamiliar.     Date of Visit: 2/12/2025       Chief Complaint  Invasive ductal carcinoma, left breast - follow up.    Oncologic History  Kaylan Mcclelland is a 34 year old female who on 08/23/2021 underwent bilateral mammography and ultrasound for a palpable left breast mass which showed a 5 cm mass in the left breast at 6 o'clock, several enlarged/prominent left axillary lymph nodes at least one of which showed cortical thickening and several right breast cysts. On 09/02/2021 she underwent ultrasound guided biopsy of the left breast mass which showed a grade 3 invasive ductal carcinoma; immunohistochemical studies showed the following: estrogen receptor 50% positive (weak to moderate), progesterone receptor 50% positive (strong), Her2 3+ (positive), and Ki67 75%.     CT chest, abdomen, pelvis w contrast on 09/14/2021 showed the left breast mass, a small 6 x 3 mm pulmonary nodule along the major fissure on the left likely representing an intramammary lymph node, a 7.7 cm pelvic mass abutting the posterior uterus, a cyst within the left ovary, and a 1.8 cm sclerotic lesions involving the proximal left femur.     Pelvic ultrasound on 09/15/2021 showed a 5.2 x 7.1 x 7.3 cm pelvic mass likely representing a pedunculated uterine fibroid and 2.2 x 2.8 x 3.2 simple left  ovarian cyst felt to be benign.     NM bone scan on 09/15/2021 showed uptake in the proximal left femur. MRI of the pelvis w/wo contrast does show a corresponding abnormality. The MRI was reviewed with radiology at [place] and the consensus was that the finding likely represented a benign lesion.     MUGA scan on 09/16/2021 showed a normal LVEF of 62%.     MRI breasts on 09/16/2021 showed the following in the left breast: a 4.8 cm mass at the 6 o'clock position with suspicious enhancement involving the skin along the inferior breast but not the chest wall, a 0.9 cm enhancing mass with enhancement at the 12 o'clock position, a 1.1 cm enhancing mass at the 6 o'clock position, a 0.7 cm indeterminate mass at the 8 o'clock position, and multiple normal sized lymph nodes with borderline thickened cortex. The following was seen in the right breast: a 0.8 cm area of indeterminate enhancement at the 12 o'clock position, a second 0.6 cm area of benign enhancement, and normal appearing axillary lymph nodes.     Genetic testing with [employer] [facility]'s My Risk panel showed that she is a carrier of the deleterious BRCA2 mutation [id number] ([id number]).    She was staged clinically as T2N0M0. On 09/17/2021 she began neoadjuvant therapy with TCHP at [facility].    Patient was premenopausal at diagnosis. She elected against egg harvesting for fertility preservation.     On 09/16/2021 patient began therapy with TCHP at an outside institution. She received her last dose on 01/10/2022 and then continued trastuzumab and pertuzumab on 02/03/2022.     On 02/17/2022 she underwent bilateral mastectomy, left sentinel lymph node biopsy, and tissue expander reconstruction. Pathology showed no residual malignancy in the left breast, no malignancy in the right breast, and 2 lymph nodes negative for malignancy (0/2).    On 12/14/2023, she underwent breast reconstruction with silicone breast implants.     History of Present Illness  Patient  returns for scheduled follow up. She has no new complaints. She states that she has been compliant with tamoxifen. She denies any self palpated masses. She denies any abnormal vaginal bleeding. She denies any new abdominal pain or bloating. She is not following with gynecology. She is following with plastic surgery.     Performance Status   Karnofsky 100% - Normal, no complaints.    Past Medical History (historical data, reviewed by physician)  Invasive ductal carcinoma, left breast (as above).     Past Surgical History (historical data, reviewed by physician)  None.    Family History (historical data, reviewed by physician)  Maternal aunt with breast cancer age 20s; first cousin (daughter of the aunt with breast cancer) with ovarian cancer age 16; maternal grandmother with ?colorectal cancer. Mother living with no history of cancer. Father living with no history of cancer.     Social History (historical data, reviewed by physician)  Denies tobacco use.     Current Medications   tamoxifen 20 MG Oral Tab Take 1 tablet (20 mg total) by mouth daily. 90 tablet 1     Allergies   Ms. Mcclelland is allergic to dermabond, nitrofurantoin, and sulfamethoxazole w/trimethoprim.     Vital Signs   Height: 167.6 cm (5' 5.98\") (02/12 1401)  Weight: 67.1 kg (147 lb 14.4 oz) (02/12 1401)  BSA (Calculated - sq m): 1.76 sq meters (02/12 1401)  Pulse: 68 (02/12 1401)  BP: 147/95 (02/12 1401)  Temp: 100.3 °F (37.9 °C) (02/12 1401)  Do Not Use - Resp Rate: --  SpO2: 100 % (02/12 1401)    Physical Examination   Constitutional      Well developed, well nourished. Appears close to chronological age. No apparent distress.   Head   Normocephalic and atraumatic.  Eyes   Conjunctiva clear; sclera anicteric.  ENMT                 External nose normal; external ears normal.  Neck   Supple; no masses.   Lymphatics  No cervical, supraclavicular, axillary adenopathy.   Breasts   Patient declines exam.  Respiratory          Normal effort; no respiratory  distress; clear to auscultation bilaterally.   Cardiovascular  Regular rate and rhythm; normal S1S2.  Abdomen  Soft; not tender; no masses; no hepatosplenomegaly.   Extremities  No lower extremity edema.   Neurologic           Motor and sensory grossly intact.  Psychiatric          Mood and affect appropriate.    Laboratory   No results found for this or any previous visit (from the past 24 hours).    Impression and Plan   1.   Invasive ductal carcinoma, left breast: Patient is staged as T2N0M0. Patient's tumor was estrogen and progesterone receptor positive and Her2 positive. She received neoadjuvant TCHP beginning on 09/16/2021. She underwent bilateral mastectomy and left sentinel lymph node biopsy on 02/17/2022. No residual malignancy was found. She continued adjuvant trastuzumab and pertuzumab with last dose on 09/21/2022. She began adjuvant tamoxifen in 04/2022 but has not been consistently compliant with the medication or follow up.          Continue tamoxifen without modification. Recommend she continue therapy at least through 04/2027.           Survivorship issues were addressed with the patient. No issues were identified by the patient.      2.   BRCA2 mutation carrier: Pelvic ultrasound order provided to patient. She is advised that she should follow regularly with gynecology as she is on tamoxifen.     Planned Follow Up   Patient will return for follow up in 6 months.    Electronically Signed by:     David Elizondo M.D.  System Medical Director, Oncology Services  [place] [facility]

## 2025-02-12 ENCOUNTER — OFFICE VISIT (OUTPATIENT)
Age: 35
End: 2025-02-12
Attending: SPECIALIST
Payer: COMMERCIAL

## 2025-02-12 VITALS
SYSTOLIC BLOOD PRESSURE: 147 MMHG | DIASTOLIC BLOOD PRESSURE: 95 MMHG | OXYGEN SATURATION: 100 % | RESPIRATION RATE: 18 BRPM | WEIGHT: 147.88 LBS | HEIGHT: 65.98 IN | HEART RATE: 68 BPM | TEMPERATURE: 100 F | BODY MASS INDEX: 23.77 KG/M2

## 2025-02-12 DIAGNOSIS — Z51.81 ENCOUNTER FOR MONITORING TAMOXIFEN THERAPY: ICD-10-CM

## 2025-02-12 DIAGNOSIS — Z15.01 BRCA2 GENE MUTATION POSITIVE IN FEMALE: ICD-10-CM

## 2025-02-12 DIAGNOSIS — Z79.810 ENCOUNTER FOR MONITORING TAMOXIFEN THERAPY: ICD-10-CM

## 2025-02-12 DIAGNOSIS — Z15.09 BRCA2 GENE MUTATION POSITIVE IN FEMALE: ICD-10-CM

## 2025-02-12 DIAGNOSIS — Z15.01 BREAST CANCER, BRCA2 POSITIVE, LEFT (HCC): ICD-10-CM

## 2025-02-12 DIAGNOSIS — Z79.810 LONG-TERM CURRENT USE OF TAMOXIFEN: Primary | ICD-10-CM

## 2025-02-12 DIAGNOSIS — Z15.02 BRCA2 GENE MUTATION POSITIVE IN FEMALE: ICD-10-CM

## 2025-02-12 DIAGNOSIS — Z91.89 HIGH RISK OF OVARIAN CANCER: ICD-10-CM

## 2025-02-12 DIAGNOSIS — C50.912 BREAST CANCER, BRCA2 POSITIVE, LEFT (HCC): ICD-10-CM

## 2025-02-12 RX ORDER — TAMOXIFEN CITRATE 20 MG/1
20 TABLET ORAL DAILY
Qty: 90 TABLET | Refills: 1 | Status: SHIPPED | OUTPATIENT
Start: 2025-02-12

## 2025-02-12 NOTE — PROGRESS NOTES
Patient is here for 6 month MD follow up for Breast cancer. Patient had a pelvic ultrasound in October. Patient is on Tamoxifen daily. Feeling well. No concerns at present time.      Education Record    Learner:  Patient    Disease / Diagnosis: Breast cancer    Barriers / Limitations:  None   Comments:    Method:  Discussion   Comments:    General Topics:  Plan of care reviewed   Comments:    Outcome:  Shows understanding   Comments:

## 2025-02-14 ENCOUNTER — TELEPHONE (OUTPATIENT)
Dept: INTERNAL MEDICINE CLINIC | Facility: CLINIC | Age: 35
End: 2025-02-14

## 2025-02-14 ENCOUNTER — OFFICE VISIT (OUTPATIENT)
Dept: INTERNAL MEDICINE CLINIC | Facility: CLINIC | Age: 35
End: 2025-02-14
Payer: COMMERCIAL

## 2025-02-14 VITALS
TEMPERATURE: 98 F | WEIGHT: 146.38 LBS | RESPIRATION RATE: 16 BRPM | HEART RATE: 74 BPM | BODY MASS INDEX: 23.53 KG/M2 | OXYGEN SATURATION: 100 % | SYSTOLIC BLOOD PRESSURE: 130 MMHG | DIASTOLIC BLOOD PRESSURE: 72 MMHG | HEIGHT: 66 IN

## 2025-02-14 DIAGNOSIS — L70.9 ACNE, UNSPECIFIED ACNE TYPE: Primary | ICD-10-CM

## 2025-02-14 DIAGNOSIS — R09.81 SINUS CONGESTION: ICD-10-CM

## 2025-02-14 PROCEDURE — 3008F BODY MASS INDEX DOCD: CPT | Performed by: FAMILY MEDICINE

## 2025-02-14 PROCEDURE — 3078F DIAST BP <80 MM HG: CPT | Performed by: FAMILY MEDICINE

## 2025-02-14 PROCEDURE — 99213 OFFICE O/P EST LOW 20 MIN: CPT | Performed by: FAMILY MEDICINE

## 2025-02-14 PROCEDURE — 3075F SYST BP GE 130 - 139MM HG: CPT | Performed by: FAMILY MEDICINE

## 2025-02-14 NOTE — TELEPHONE ENCOUNTER
Patient scheduled for tetanus shot and HPV vaccine.    Future Appointments   Date Time Provider Department Center   5/1/2025  9:45 AM [facility] NURSE [facility]

## 2025-02-14 NOTE — PROGRESS NOTES
Subjective:   Patient ID: Kaylan Mcclelland is a 34 year old female.    HPI Here with request for referrals for Derm- has been seeing the same Derm and now has HMO and needs referral. Would like to see ENT as well for chronic sinus congestion.     History/Other:   Review of Systems   Constitutional:  Negative for chills and fever.   HENT:  Positive for congestion. Negative for ear pain.    Respiratory:  Negative for chest tightness and shortness of breath.      Current Outpatient Medications   Medication Sig Dispense Refill    tamoxifen 20 MG Oral Tab Take 1 tablet (20 mg total) by mouth daily. 90 tablet 1     Allergies:Allergies[1]    Objective:   Physical Exam  Vitals reviewed.   Constitutional:       Appearance: Normal appearance. She is well-developed.   HENT:      Head: Normocephalic and atraumatic.   Pulmonary:      Effort: Pulmonary effort is normal.   Neurological:      Mental Status: She is alert.   Psychiatric:         Mood and Affect: Mood normal.         Behavior: Behavior normal.         Assessment & Plan:   1. Acne, unspecified acne type    2. Sinus congestion    Referral to Derm.   Referral to ENT.     No orders of the defined types were placed in this encounter.      Meds This Visit:  Requested Prescriptions      No prescriptions requested or ordered in this encounter       Imaging & Referrals:  DERM - EXTERNAL  ENT - INTERNAL         [1]   Allergies  Allergen Reactions    Dermabond HIVES    Nitrofurantoin OTHER (SEE COMMENTS) and NAUSEA AND VOMITING     Vomiting after taking  It    Sulfamethoxazole W/Trimethoprim OTHER (SEE COMMENTS) and NAUSEA AND VOMITING     Swelling, drowsiness,

## 2025-02-26 ENCOUNTER — HOSPITAL ENCOUNTER (OUTPATIENT)
Age: 35
Discharge: HOME | End: 2025-02-26
Payer: COMMERCIAL

## 2025-02-26 VITALS
BODY MASS INDEX: 23.3 KG/M2 | HEART RATE: 60 BPM | DIASTOLIC BLOOD PRESSURE: 82 MMHG | RESPIRATION RATE: 16 BRPM | TEMPERATURE: 98 F | HEIGHT: 66 IN | OXYGEN SATURATION: 99 % | SYSTOLIC BLOOD PRESSURE: 125 MMHG | WEIGHT: 145 LBS

## 2025-02-26 DIAGNOSIS — R09.81 NASAL CONGESTION WITH RHINORRHEA: Primary | ICD-10-CM

## 2025-02-26 DIAGNOSIS — N76.0 ACUTE VAGINITIS: ICD-10-CM

## 2025-02-26 DIAGNOSIS — J34.89 NASAL CONGESTION WITH RHINORRHEA: Primary | ICD-10-CM

## 2025-02-26 PROCEDURE — 99214 OFFICE O/P EST MOD 30 MIN: CPT

## 2025-02-26 PROCEDURE — 99213 OFFICE O/P EST LOW 20 MIN: CPT

## 2025-02-26 RX ORDER — PREDNISONE 20 MG/1
40 TABLET ORAL DAILY
Qty: 6 TABLET | Refills: 0 | Status: SHIPPED | OUTPATIENT
Start: 2025-02-26 | End: 2025-03-01

## 2025-02-26 RX ORDER — FLUCONAZOLE 150 MG/1
150 TABLET ORAL ONCE
Qty: 1 TABLET | Refills: 0 | Status: SHIPPED | OUTPATIENT
Start: 2025-02-26 | End: 2025-02-26

## 2025-02-26 RX ORDER — DEXAMETHASONE 4 MG/1
4 TABLET ORAL ONCE
Status: COMPLETED | OUTPATIENT
Start: 2025-02-26 | End: 2025-02-26

## 2025-02-26 NOTE — DISCHARGE INSTRUCTIONS
Please return to the ER/clinic if symptoms worsen. Follow-up with your PCP in 24-48 hours as needed.    The decadron will  work in your system the next several days.  You may start the additional prednisone on day 2 or 3 if symptoms persist.  Recommend nasal saline flushes.  Recommend taking an over the counter antihistamine daily: IE zyrtec/claritin.  Sleep more upright.  Push fluids and gargle with warm saline rinses.  Follow up with your care physician and potentially ENT for further evaluation and treatment.

## 2025-02-26 NOTE — ED PROVIDER NOTES
Patient Seen in: [facility]      History     Chief Complaint   Patient presents with    Cough/URI     Stated Complaint: Allergies    Subjective:   HPI    34-year-old female with a history of allergies here with complaint of copious amounts of nasal congestion that is yellowish in color.  Patient denies any facial pain.  Patient denies chest pain, shortness of breath, cough, abdominal pain, nausea, vomiting or diarrhea.  Patient is tolerating p.o. speaking full sentences.  Afebrile.  NOTE: Patient also reports she has a yeast infection from a recent course of antibiotics she finished from the dermatologist.      Objective:     Past Medical History:    Abdominal pain    Anesthesia complication    Back problem    Belching    Bloating    Breast cancer (HCC)    Cancer (HCC)    breast    Constipation    Frequent use of laxatives    Irregular bowel habits    Personal history of antineoplastic chemotherapy    last dose 1/2022    PONV (postoperative nausea and vomiting)            The patient's medication list, past medical history and social history elements  as listed in today's nurse's notes are reviewed and agree.   The patient's family history is reviewed and is noncontributory to the presenting problem, except as indicated as above.     Past Surgical History:   Procedure Laterality Date    Breast reconstruction Bilateral 12/2023    Hc chemo admin peritoneal cavity via port or cath  2021    Mastectomy left  2022    Mastectomy right  2022    Port, indwelling, imp      [place] teeth removed  2010    no anesthesia                Social History     Socioeconomic History    Marital status: Single   Tobacco Use    Smoking status: Never    Smokeless tobacco: Never   Vaping Use    Vaping status: Never Used   Substance and Sexual Activity    Alcohol use: Never    Drug use: Never    Sexual activity: Not Currently   Other Topics Concern    Caffeine Concern Yes    Exercise No    Seat Belt Yes     Social Drivers of  Health      [facility]              Review of Systems    Positive for stated complaint: Allergies  Other systems are as noted in HPI.  Constitutional and vital signs reviewed.      All other systems reviewed and negative except as noted above.    Physical Exam     ED Triage Vitals [02/26/25 1308]   /82   Pulse 60   Resp 16   Temp 98.4 °F (36.9 °C)   Temp src Oral   SpO2 99 %   O2 Device None (Room air)       Current Vitals:   Vital Signs  BP: 125/82  Pulse: 60  Resp: 16  Temp: 98.4 °F (36.9 °C)  Temp src: Oral    Oxygen Therapy  SpO2: 99 %  O2 Device: None (Room air)        Physical Exam  Vitals and nursing note reviewed.   Constitutional:       Appearance: Normal appearance. She is well-developed.   HENT:      Head: Normocephalic.      Jaw: There is normal jaw occlusion.      Right Ear: Tympanic membrane and external ear normal.      Left Ear: Tympanic membrane and external ear normal.      Nose: Congestion and rhinorrhea present. Rhinorrhea is purulent.      Mouth/Throat:      Lips: Pink.      Mouth: Mucous membranes are moist.      Pharynx: Postnasal drip present.   Eyes:      Conjunctiva/sclera: Conjunctivae normal.      Pupils: Pupils are equal, round, and reactive to light.   Cardiovascular:      Rate and Rhythm: Normal rate and regular rhythm.      Heart sounds: Normal heart sounds.   Pulmonary:      Effort: Pulmonary effort is normal.      Breath sounds: Normal breath sounds.   Musculoskeletal:      Cervical back: Normal range of motion and neck supple.   Skin:     General: Skin is warm.      Capillary Refill: Capillary refill takes less than 2 seconds.   Neurological:      General: No focal deficit present.      Mental Status: She is alert and oriented to person, place, and time.   Psychiatric:         Mood and Affect: Mood normal.         Behavior: Behavior normal.         Thought Content: Thought content normal.         Judgment: Judgment normal.            ED Course                   MDM   Clinical Impression: rhinorrhea with nasal congestion/vaginitis  Course of Treatment:   The decadron will  work in your system the next several days.  You may start the additional prednisone on day 2 or 3 if symptoms persist.  Recommend nasal saline flushes.  Recommend taking an over the counter antihistamine daily: IE zyrtec/claritin.  Sleep more upright.  Push fluids and gargle with warm saline rinses.  Follow up with your care physician and potentially ENT for further evaluation and treatment.      The patient is encouraged to return if any concerning symptoms arise. Additional verbal discharge instructions are given and discussed. Discharge medications are discussed. The patient is in good condition throughout the visit today and remains so upon discharge. I discuss the plan of care with the patient, who expresses understanding. All questions and concerns are addressed to the patient's satisfaction prior to discharge today.  Previous conversations with PCP and charts were reviewed.                Disposition and Plan     Clinical Impression:  1. Nasal congestion with rhinorrhea    2. Acute vaginitis         Disposition:  Discharge  2/26/2025  1:41 pm    Follow-up:  Negin Miramontes DO  [address], [address]  [place] [address]  [phone]          Moises Garcia MD  [facility] [place] [address]  [phone]                Medications Prescribed:  Current Discharge Medication List        START taking these medications    Details   fluconazole (DIFLUCAN) 150 MG Oral Tab Take 1 tablet (150 mg total) by mouth once for 1 dose.  Qty: 1 tablet, Refills: 0      predniSONE 20 MG Oral Tab Take 2 tablets (40 mg total) by mouth daily for 3 days. Start on day 2-3 if symptoms persist  Qty: 6 tablet, Refills: 0                 Supplementary Documentation:

## 2025-03-24 ENCOUNTER — PATIENT MESSAGE (OUTPATIENT)
Dept: INTERNAL MEDICINE CLINIC | Facility: CLINIC | Age: 35
End: 2025-03-24

## 2025-03-24 DIAGNOSIS — K59.00 CONSTIPATION, UNSPECIFIED CONSTIPATION TYPE: Primary | ICD-10-CM

## 2025-03-24 NOTE — TELEPHONE ENCOUNTER
, patient requesting follow up [facility]. Pended if agreeable.

## 2025-03-31 ENCOUNTER — OFFICE VISIT (OUTPATIENT)
Facility: LOCATION | Age: 35
End: 2025-03-31
Payer: COMMERCIAL

## 2025-03-31 DIAGNOSIS — J34.89 NASAL DISCHARGE: Primary | ICD-10-CM

## 2025-03-31 PROCEDURE — 99203 OFFICE O/P NEW LOW 30 MIN: CPT | Performed by: STUDENT IN AN ORGANIZED HEALTH CARE EDUCATION/TRAINING PROGRAM

## 2025-03-31 RX ORDER — FLUTICASONE PROPIONATE 50 MCG
1 SPRAY, SUSPENSION (ML) NASAL 2 TIMES DAILY
Qty: 16 G | Refills: 3 | Status: SHIPPED | OUTPATIENT
Start: 2025-03-31

## 2025-03-31 NOTE — PROGRESS NOTES
Kaylan Mcclelland is a 35 year old female referred by Dr. Miramontes for evaluation of sinonasal symptoms.   Chief Complaint   Patient presents with    Sinus Problem     HPI:   35 year old female presenting for evaluation of sinonasal symptoms onset a few months ago.  She planes of yellow nasal discharge and occasional facial pressure.  Patient denies postnasal drip, nasal obstruction and hyposmia.  She denies a history of seasonal allergies.  She has tried Claritin without relief but has not tried any nasal sprays.      Current Outpatient Medications   Medication Sig Dispense Refill    tamoxifen 20 MG Oral Tab Take 1 tablet (20 mg total) by mouth daily. 90 tablet 1      Past Medical History:    Abdominal pain    Anesthesia complication    Back problem    Belching    Bloating    Breast cancer (HCC)    Cancer (HCC)    breast    Constipation    Frequent use of laxatives    Irregular bowel habits    Personal history of antineoplastic chemotherapy    last dose 1/2022    PONV (postoperative nausea and vomiting)      Social History:  Social History     Socioeconomic History    Marital status: Single   Tobacco Use    Smoking status: Never    Smokeless tobacco: Never   Vaping Use    Vaping status: Never Used   Substance and Sexual Activity    Alcohol use: Never    Drug use: Never    Sexual activity: Not Currently   Other Topics Concern    Caffeine Concern Yes    Exercise No    Seat Belt Yes     Social Drivers of Health      Received from [employer]    [email or web address]      Past Surgical History:   Procedure Laterality Date    Breast reconstruction Bilateral 12/2023    Hc chemo admin peritoneal cavity via port or cath  2021    Mastectomy left  2022    Mastectomy right  2022    Port, indwelling, imp      [place] teeth removed  2010    no anesthesia         REVIEW OF SYSTEMS:   GENERAL HEALTH: feels well otherwise  GENERAL : denies fever, chills, sweats, weight loss, weight gain  SKIN: denies any unusual skin lesions  or rashes  RESPIRATORY: denies shortness of breath with exertion  NEURO: denies headaches    EXAM:   LMP 08/08/2024 (Approximate)     System Findings Details   Constitutional  Overall appearance - Normal.   Head/Face  Facial features -- Normal. Skull - Normal.   Eyes  Sclera white, pupils equal and round, EOMI   Ears  External ears normal in appearance, EACs patent, TMs intact bilaterally, no evidence of middle ear effusion   Nose  External nose normal in appearance, nares patent, septum with high deviation bilaterally, no epistaxis   Throat  Posterior pharynx clear, uvula midline, tonsils 1+   Oral cavity  Lips normal in appearance, mucous membranes clear, no masses, FOM soft, tongue without lesions   Neck  Trachea midline, no lymphadenopathy, no masses   Neurological  Memory - Normal. Cranial nerves - Cranial nerves II through XII grossly intact.       ASSESSMENT AND PLAN:   35 year old female presenting for evaluation of sinonasal symptoms.    -Flonase twice daily, sinus rinse twice daily-NeilMed sample provided  -Follow-up in 2 months    The patient indicates understanding of these issues and agrees to the plan.    Carrol De Leon MD, MARCELLA  Facial Plastic & Reconstructive Surgery, Otolaryngology-Head & Neck Surgery  [facility]    This note was prepared using Dragon Medical voice recognition dictation software. As a result, errors may occur. When identified, these errors have been corrected. While every attempt is made to correct errors during dictation, discrepancies may still exist.

## 2025-04-18 ENCOUNTER — PATIENT MESSAGE (OUTPATIENT)
Dept: INTERNAL MEDICINE CLINIC | Facility: CLINIC | Age: 35
End: 2025-04-18

## 2025-04-23 ENCOUNTER — LAB ENCOUNTER (OUTPATIENT)
Dept: LAB | Age: 35
End: 2025-04-23
Attending: FAMILY MEDICINE
Payer: COMMERCIAL

## 2025-04-23 DIAGNOSIS — R53.83 FATIGUE, UNSPECIFIED TYPE: ICD-10-CM

## 2025-04-23 DIAGNOSIS — K59.00 CONSTIPATION, UNSPECIFIED CONSTIPATION TYPE: ICD-10-CM

## 2025-04-23 LAB
DEPRECATED HBV CORE AB SER IA-ACNC: 29 NG/ML (ref 50–306)
FOLATE SERPL-MCNC: 9.5 NG/ML (ref 5.4–?)
IRON SATN MFR SERPL: 22 % (ref 15–50)
IRON SERPL-MCNC: 81 UG/DL (ref 50–170)
TOTAL IRON BINDING CAPACITY: 371 UG/DL (ref 250–425)
TRANSFERRIN SERPL-MCNC: 306 MG/DL (ref 250–380)
TSI SER-ACNC: 1.45 UIU/ML (ref 0.55–4.78)
VIT B12 SERPL-MCNC: 367 PG/ML (ref 211–911)
VIT D+METAB SERPL-MCNC: 5.9 NG/ML (ref 30–100)

## 2025-04-23 PROCEDURE — 82746 ASSAY OF FOLIC ACID SERUM: CPT

## 2025-04-23 PROCEDURE — 82728 ASSAY OF FERRITIN: CPT

## 2025-04-23 PROCEDURE — 82607 VITAMIN B-12: CPT

## 2025-04-23 PROCEDURE — 83540 ASSAY OF IRON: CPT

## 2025-04-23 PROCEDURE — 82306 VITAMIN D 25 HYDROXY: CPT

## 2025-04-23 PROCEDURE — 84443 ASSAY THYROID STIM HORMONE: CPT

## 2025-04-23 PROCEDURE — 36415 COLL VENOUS BLD VENIPUNCTURE: CPT

## 2025-04-23 PROCEDURE — 83550 IRON BINDING TEST: CPT

## 2025-05-02 ENCOUNTER — NURSE ONLY (OUTPATIENT)
Age: 35
End: 2025-05-02
Payer: COMMERCIAL

## 2025-05-02 PROCEDURE — 90471 IMMUNIZATION ADMIN: CPT | Performed by: FAMILY MEDICINE

## 2025-05-02 PROCEDURE — 90472 IMMUNIZATION ADMIN EACH ADD: CPT | Performed by: FAMILY MEDICINE

## 2025-05-02 PROCEDURE — 90651 9VHPV VACCINE 2/3 DOSE IM: CPT | Performed by: FAMILY MEDICINE

## 2025-05-02 PROCEDURE — 90715 TDAP VACCINE 7 YRS/> IM: CPT | Performed by: FAMILY MEDICINE

## 2025-06-24 PROBLEM — K21.9 GERD (GASTROESOPHAGEAL REFLUX DISEASE): Status: ACTIVE | Noted: 2025-06-24

## 2025-06-24 PROBLEM — R11.0 NAUSEA: Status: ACTIVE | Noted: 2025-06-24

## 2025-06-24 PROBLEM — R10.13 ABDOMINAL PAIN, EPIGASTRIC: Status: ACTIVE | Noted: 2025-06-24

## 2025-06-24 PROBLEM — D50.9 ANEMIA, IRON DEFICIENCY: Status: ACTIVE | Noted: 2025-06-24

## 2025-06-24 PROBLEM — K59.00 CONSTIPATION: Status: ACTIVE | Noted: 2025-06-24

## 2025-06-24 PROCEDURE — 88305 TISSUE EXAM BY PATHOLOGIST: CPT | Performed by: STUDENT IN AN ORGANIZED HEALTH CARE EDUCATION/TRAINING PROGRAM

## 2025-08-13 ENCOUNTER — APPOINTMENT (OUTPATIENT)
Facility: LOCATION | Age: 35
End: 2025-08-13
Attending: SPECIALIST

## 2025-08-27 ENCOUNTER — OFFICE VISIT (OUTPATIENT)
Facility: LOCATION | Age: 35
End: 2025-08-27
Attending: SPECIALIST

## 2025-08-27 VITALS
HEIGHT: 65.98 IN | OXYGEN SATURATION: 100 % | HEART RATE: 66 BPM | SYSTOLIC BLOOD PRESSURE: 139 MMHG | DIASTOLIC BLOOD PRESSURE: 87 MMHG | BODY MASS INDEX: 23.54 KG/M2 | WEIGHT: 146.5 LBS | RESPIRATION RATE: 18 BRPM | TEMPERATURE: 98 F

## 2025-08-27 DIAGNOSIS — Z15.02 BRCA2 GENE MUTATION POSITIVE IN FEMALE: Primary | ICD-10-CM

## 2025-08-27 DIAGNOSIS — Z17.0 MALIGNANT NEOPLASM OF OVERLAPPING SITES OF LEFT BREAST IN FEMALE, ESTROGEN RECEPTOR POSITIVE (HCC): ICD-10-CM

## 2025-08-27 DIAGNOSIS — Z15.09 BRCA2 GENE MUTATION POSITIVE IN FEMALE: Primary | ICD-10-CM

## 2025-08-27 DIAGNOSIS — Z79.810 LONG-TERM CURRENT USE OF TAMOXIFEN: ICD-10-CM

## 2025-08-27 DIAGNOSIS — Z91.89 HIGH RISK OF OVARIAN CANCER: ICD-10-CM

## 2025-08-27 DIAGNOSIS — Z15.01 BRCA2 GENE MUTATION POSITIVE IN FEMALE: Primary | ICD-10-CM

## 2025-08-27 DIAGNOSIS — C50.812 MALIGNANT NEOPLASM OF OVERLAPPING SITES OF LEFT BREAST IN FEMALE, ESTROGEN RECEPTOR POSITIVE (HCC): ICD-10-CM

## 2025-08-27 RX ORDER — ISOTRETINOIN 20 MG/1
20 CAPSULE, GELATIN COATED ORAL DAILY
COMMUNITY
Start: 2025-07-20

## 2025-08-27 RX ORDER — TAMOXIFEN CITRATE 20 MG/1
20 TABLET ORAL DAILY
Qty: 90 TABLET | Refills: 1 | Status: SHIPPED | OUTPATIENT
Start: 2025-08-27

## (undated) DIAGNOSIS — Z17.0 MALIGNANT NEOPLASM OF OVERLAPPING SITES OF LEFT BREAST IN FEMALE, ESTROGEN RECEPTOR POSITIVE (HCC): ICD-10-CM

## (undated) DIAGNOSIS — T45.1X5A LEUKOPENIA DUE TO ANTINEOPLASTIC CHEMOTHERAPY (HCC): ICD-10-CM

## (undated) DIAGNOSIS — C50.812 MALIGNANT NEOPLASM OF OVERLAPPING SITES OF LEFT BREAST IN FEMALE, ESTROGEN RECEPTOR POSITIVE (HCC): ICD-10-CM

## (undated) DIAGNOSIS — D69.59 CHEMOTHERAPY-INDUCED THROMBOCYTOPENIA: ICD-10-CM

## (undated) DIAGNOSIS — R22.42 LEG MASS, LEFT: Primary | ICD-10-CM

## (undated) DIAGNOSIS — Z17.0 MALIGNANT NEOPLASM OF OVERLAPPING SITES OF LEFT BREAST IN FEMALE, ESTROGEN RECEPTOR POSITIVE (HCC): Primary | ICD-10-CM

## (undated) DIAGNOSIS — T45.1X5A CHEMOTHERAPY-INDUCED THROMBOCYTOPENIA: ICD-10-CM

## (undated) DIAGNOSIS — C50.812 MALIGNANT NEOPLASM OF OVERLAPPING SITES OF LEFT BREAST IN FEMALE, ESTROGEN RECEPTOR POSITIVE (HCC): Primary | ICD-10-CM

## (undated) DIAGNOSIS — D70.1 LEUKOPENIA DUE TO ANTINEOPLASTIC CHEMOTHERAPY (HCC): ICD-10-CM

## (undated) DEVICE — SYSTEM ADIPOSE PROC AUTOLGS ADV INCL CANSTR

## (undated) DEVICE — Device

## (undated) DEVICE — MARKER SKIN PREP RESIST STRL

## (undated) DEVICE — 3M™ STERI-STRIP™ REINFORCED ADHESIVE SKIN CLOSURES, R1548, 1 IN X 5 IN (25 MM X 125 MM), 4 STRIPS/ENVELOPE: Brand: 3M™ STERI-STRIP™

## (undated) DEVICE — SYRINGE MED 5ML STD CLR PLAS LL TIP N CTRL

## (undated) DEVICE — SOLUTION PREP 4OZ 10% POVIDONE IOD SCR TOP

## (undated) DEVICE — SUTURE VICRYL 3-0 SH

## (undated) DEVICE — 40580 - THE PINK PAD - ADVANCED TRENDELENBURG POSITIONING KIT: Brand: 40580 - THE PINK PAD - ADVANCED TRENDELENBURG POSITIONING KIT

## (undated) DEVICE — PLASTIC BREAST CDS-LF: Brand: MEDLINE INDUSTRIES, INC.

## (undated) DEVICE — SUTURE PDS II 2-0 CT-2

## (undated) DEVICE — STERILE POLYISOPRENE POWDER-FREE SURGICAL GLOVES: Brand: PROTEXIS

## (undated) DEVICE — ELECTRODE ES L2.75IN XLN STD BLDE MOD E-Z CLN

## (undated) DEVICE — CLEAR MONOFILAMENT (POLYDIOXANONE), ABSORBABLE SURGICAL SUTURE: Brand: PDS

## (undated) DEVICE — LAPAROTOMY SPONGE - RF AND X-RAY DETECTABLE PRE-WASHED: Brand: SITUATE

## (undated) DEVICE — HEMOCLIP HORIZON SM 001200

## (undated) DEVICE — DRAPE UTIL L W18XL24IN PLAS STR ADH REINF

## (undated) DEVICE — 3M™ IOBAN™ 2 ANTIMICROBIAL INCISE DRAPE 6648EZ: Brand: IOBAN™ 2

## (undated) DEVICE — SOLUTION IRRIG 1000ML 0.9% NACL USP BTL

## (undated) DEVICE — SYRINGE 50ML LL TIP

## (undated) DEVICE — 3M™ IOBAN™ 2 ANTIMICROBIAL INCISE DRAPE 6651EZ: Brand: IOBAN™ 2

## (undated) DEVICE — HEMOCLIP HORIZON MED 002200

## (undated) DEVICE — ASPIRATION TUBING SET, DISPOSABLE: Brand: MICROAIRE®

## (undated) DEVICE — STOPCOCK IV 4 WAY BD

## (undated) DEVICE — SUTURE VCRL SZ 0 L27IN ABSRB UD L36MM CT-1

## (undated) DEVICE — 1010 S-DRAPE TOWEL DRAPE 10/BX: Brand: STERI-DRAPE™

## (undated) DEVICE — SUTURE MCRYL SZ 4-0 L27IN ABSRB UD L19MM PS-2

## (undated) DEVICE — ADHESIVE SKIN TOP FOR WND CLSR DERMBND ADV

## (undated) DEVICE — SYRINGE,TOOMEY,IRRIGATION,70CC,STERILE: Brand: MEDLINE

## (undated) DEVICE — TOWEL SURG OR 17X30IN BLUE

## (undated) DEVICE — VIOLET BRAIDED (POLYGLACTIN 910), SYNTHETIC ABSORBABLE SUTURE: Brand: COATED VICRYL

## (undated) DEVICE — LIGHT HANDLE

## (undated) DEVICE — SUTURE VCRL SZ 3-0 L27IN ABSRB UD L26MM SH

## (undated) DEVICE — Device: Brand: MICROAIRE®

## (undated) DEVICE — UNDERPAD 23X36 LIGHT ASBORB

## (undated) DEVICE — PROXIMATE SKIN STAPLERS (35 WIDE) CONTAINS 35 STAINLESS STEEL STAPLES (FIXED HEAD): Brand: PROXIMATE

## (undated) DEVICE — STERILE LATEX POWDER-FREE SURGICAL GLOVES WITH HYDROGEL COATING, SMOOTH FINISH, STRAIGHT FINGER: Brand: PROTEXIS

## (undated) DEVICE — BREAST-HERNIA-PORT CDS-LF: Brand: MEDLINE INDUSTRIES, INC.

## (undated) DEVICE — SUPER SPONGES,MEDIUM: Brand: KERLIX

## (undated) DEVICE — 3M(TM) TEGADERM(TM) TRANSPARENT FILM DRESSING FRAME STYLE 9505W: Brand: 3M™ TEGADERM™

## (undated) DEVICE — PROVE COVER: Brand: UNBRANDED

## (undated) DEVICE — SOL  .9 1000ML BTL

## (undated) DEVICE — STANDARD HYPODERMIC NEEDLE,POLYPROPYLENE HUB: Brand: MONOJECT

## (undated) DEVICE — SOLUTION SET, MALE LUER LOCK ADAPTER

## (undated) DEVICE — EXOFIN TISSUE ADHESIVE 1.0ML

## (undated) DEVICE — SLEEVE COMPR M KNEE LEN SGL USE KENDALL SCD

## (undated) DEVICE — #15 STERILE STAINLESS BLADE: Brand: STERILE STAINLESS BLADES

## (undated) DEVICE — SUTURE ETHILON 3-0 FS-1

## (undated) DEVICE — MEGADYNE E-Z CLEAN BLADE 2.75"

## (undated) DEVICE — SUTURE MONOCRYL 4-0 PS-2

## (undated) DEVICE — CG INFILTRATION TUBING: Brand: CG INFILTRATION TUBING

## (undated) DEVICE — DRESSING BIOPATCH 1X4 CNTR

## (undated) DEVICE — SUTURE ETHILON 3-0 PS-1

## (undated) DEVICE — SCD SLEEVE KNEE HI BLEND

## (undated) DEVICE — SUTURE SILK 2-0 FS

## (undated) DEVICE — DRAIN ROUND HUBLESS 15FR

## (undated) NOTE — Clinical Note
I agree that no surgical intervention is needed at this point. For since completion state probably would reimage the area in about 6 months either with an ultrasound or if you are planning to follow-up with CT scans that would be fine also.

## (undated) NOTE — LETTER
Patient Name: Shaq Varela CSN: [id number]  -Age / Sex: 4283-Z: 32 y  female Medical Records: [id number]    ABNORMAL VALUES  Surgeon(s):  MD Ty Peguero MD  Anesthesia Type: General  Procedure Description: Bilateral nipple versus skin sparing mastectomies, left lymphoscintigraphy, left sentinel lymph node biopsy, possible left axillary lymph node dissection Ksenia Nurse), Immediate bilateral breast reconstruction with tissue expanders, acellular dermal matrix (Ana)   Primary Surgeon:  Trista Arenas MD  Phone Number: [phone]    PLEASE NOTE THE FOLLOWING ABNORMALITIES:   Hematology  WBC 2.8 and Platelets 80 on last CBC 2/3/22  ________________________________________________________  Anesthesia to review patient's chart